# Patient Record
Sex: FEMALE | Race: WHITE | NOT HISPANIC OR LATINO | Employment: OTHER | URBAN - METROPOLITAN AREA
[De-identification: names, ages, dates, MRNs, and addresses within clinical notes are randomized per-mention and may not be internally consistent; named-entity substitution may affect disease eponyms.]

---

## 2017-02-24 ENCOUNTER — OFFICE VISIT (OUTPATIENT)
Dept: CARDIOLOGY | Facility: CLINIC | Age: 82
End: 2017-02-24

## 2017-02-24 VITALS
HEIGHT: 65 IN | SYSTOLIC BLOOD PRESSURE: 109 MMHG | BODY MASS INDEX: 29.66 KG/M2 | DIASTOLIC BLOOD PRESSURE: 66 MMHG | WEIGHT: 178 LBS | HEART RATE: 72 BPM

## 2017-02-24 DIAGNOSIS — I89.0 CHRONIC ACQUIRED LYMPHEDEMA: ICD-10-CM

## 2017-02-24 DIAGNOSIS — I11.9 HYPERTENSIVE HEART DISEASE WITHOUT HEART FAILURE: Primary | ICD-10-CM

## 2017-02-24 DIAGNOSIS — I10 CHRONIC HYPERTENSION: ICD-10-CM

## 2017-02-24 DIAGNOSIS — G47.9 DISORDERED SLEEP: ICD-10-CM

## 2017-02-24 DIAGNOSIS — E78.5 DYSLIPIDEMIA: ICD-10-CM

## 2017-02-24 PROCEDURE — 99213 OFFICE O/P EST LOW 20 MIN: CPT | Performed by: INTERNAL MEDICINE

## 2017-02-24 RX ORDER — ATORVASTATIN CALCIUM 10 MG/1
TABLET, FILM COATED ORAL
COMMUNITY
Start: 2015-09-12

## 2017-02-24 RX ORDER — NITROGLYCERIN 0.4 MG/1
TABLET SUBLINGUAL AS NEEDED
COMMUNITY
Start: 2015-05-12 | End: 2018-11-12

## 2017-02-24 RX ORDER — ACEBUTOLOL HYDROCHLORIDE 200 MG/1
CAPSULE ORAL NIGHTLY
COMMUNITY
Start: 2015-06-02 | End: 2017-04-25 | Stop reason: SDUPTHER

## 2017-02-24 RX ORDER — FAMOTIDINE 20 MG/1
20 TABLET, FILM COATED ORAL 2 TIMES DAILY
COMMUNITY
Start: 2015-09-18

## 2017-02-24 RX ORDER — TORSEMIDE 20 MG/1
20 TABLET ORAL TAKE AS DIRECTED
COMMUNITY
Start: 2014-04-28 | End: 2017-07-21 | Stop reason: HOSPADM

## 2017-02-24 RX ORDER — ERGOCALCIFEROL 1.25 MG/1
50000 CAPSULE ORAL WEEKLY
COMMUNITY
Start: 2015-08-18 | End: 2019-02-25

## 2017-02-24 NOTE — PROGRESS NOTES
Subjective:     Encounter Date:02/24/2017      Patient ID: Omayra Bhardwaj is an 81 y.o.  white female, mental health/clinical , from [place], [place].     INTERNIST: Kitty Romero MD   ENDOCRINOLOGIST: Corbin Rossi MD     Chief Complaint:   Chief Complaint   Patient presents with   • Follow-up     htn     Problem List:  1. Probable hypertensive cardiovascular disease:  a. Longstanding hypertension with recent increased palpitations and echocardiogram demonstrating reduced LVEF (0.50) with mild TR with abnormal EKG revealing mild nonspecific T-wave changes, winter 2013.   b. Acceptable event recorder with continued medical therapy felt warranted.  c. Nuclear stress test showing a normal Lexiscan with LVEF (0.74), 05/28/2015.  d. Residual class I symptoms.   2. Dyslipidemia, untreated.  3. Chronic hypertension, probably essential.   4. History of obesity (BMI 29.5).  5. Former tobacco abuse.  6. Depression.  7. Chronic lymphedema with probable chronic lower extremity venous insufficiency with recent acceptable bilateral lower extremity venous duplex study, March 2013.  8. Thyroid nodule, workup pending.  9. Hypercalcemia/hyperparathyroidism, with contemplated parathyroidectomy (Dr. Davis, [facility]).  10. Recent disordered sleep with prominent snoring and occasional nocturnal hypersomnolence.   11. Surgical history:  a. Tonsillectomy, 1942.  b. Appendectomy, 1945.  c. Bilateral cataract removal, 2010.  d. Squamous cell skin cancer resection, 2010.  e. Laparoscopy, 1986.     No Known Allergies      Current Outpatient Prescriptions:   •  acebutolol (SECTRAL) 200 MG capsule, Take  by mouth Every Night., Disp: , Rfl:   •  atorvastatin (LIPITOR) 10 MG tablet, Take  by mouth Daily., Disp: , Rfl:   •  famotidine (PEPCID) 20 MG tablet, Take  by mouth Daily., Disp: , Rfl:   •  lisinopril (PRINIVIL,ZESTRIL) 5 MG tablet, TAKE 1 TABLET DAILY., Disp: 30 tablet, Rfl:  "5  •  nitroglycerin (NITROSTAT) 0.4 MG SL tablet, Place  under the tongue As Needed., Disp: , Rfl:   •  spironolactone (ALDACTONE) 25 MG tablet, TAKE 1 TABLET DAILY., Disp: 30 tablet, Rfl: 6  •  torsemide (DEMADEX) 20 MG tablet, Take  by mouth Daily., Disp: , Rfl:   •  vitamin D (ERGOCALCIFEROL) 37853 UNITS capsule capsule, Take  by mouth 1 (One) Time Per Week., Disp: , Rfl:     History of Present Illness Patient returns for followup after an 11-month hiatus.  Patient denies any chest pain, shortness of breath, presyncope, syncope, edema, palpitations.  She hurt her left foot after dropping a hand-held shopping basket on her foot a couple of years ago, and she continues to have swelling in that foot more than her right foot.  She sees a podiatrist every 6 months.  She is scheduled to have labs drawn with her internist in a couple of months, and she will share them with us when she had them done.  She states that she got the flu vaccine, but she still got the flu 3 weeks ago.  She is currently writing an autobiography.     ROS   Obtained and otherwise negative except as outlined in problem list and HPI.    Procedures       Objective:       Vitals:    02/24/17 1420 02/24/17 1421   BP: 146/75 109/66   BP Location: Left arm Left arm   Patient Position: Sitting Standing   Pulse: 61 72   Weight: 178 lb (80.7 kg)    Height: 65\" (165.1 cm)    Recheck blood pressure right arm sitting 110/58  Body mass index is 29.62 kg/(m^2).   Last weight:  179.2 lbs.    Physical Exam   Constitutional: She appears well-developed and well-nourished.   HENT:   Head: Normocephalic and atraumatic.   Mouth/Throat: Oropharynx is clear and moist.   Neck: Neck supple. No JVD present. Carotid bruit is not present. No thyromegaly present.   Cardiovascular: Normal rate and regular rhythm.  Exam reveals no gallop, no S3 and no friction rub.    No murmur heard.  Pulses:       Carotid pulses are 1+ on the right side, and 1+ on the left side.       Radial " pulses are 1+ on the right side, and 1+ on the left side.        Femoral pulses are 1+ on the right side, and 1+ on the left side.       Popliteal pulses are 1+ on the right side, and 1+ on the left side.        Dorsalis pedis pulses are 1+ on the right side, and 1+ on the left side.        Posterior tibial pulses are 1+ on the right side, and 1+ on the left side.   Pulmonary/Chest: Effort normal and breath sounds normal.   Abdominal: Soft. She exhibits no mass. There is no hepatosplenomegaly. There is no tenderness.   Musculoskeletal: She exhibits edema (1+ left ankle).   Lymphadenopathy:     She has no cervical adenopathy.   Neurological: She is alert. She has normal strength. No cranial nerve deficit or sensory deficit.   Skin: Skin is warm, dry and intact.       Lab Review: none to review        Assessment:     Overall continued acceptable course with no interim cardiopulmonary complaints with acceptable functional status. We will defer additional diagnostic or therapeutic intervention from a cardiac perspective at this time.  Hopefully, we will be allowed to review her upcoming laboratory studies.  At this time, she does not appear to have any findings of uncontrolled hypertension, CHF, angina pectoris, or important arrhythmia.     Diagnosis Plan   1. Hypertensive heart disease without heart failure     2. Chronic hypertension     3. Dyslipidemia     4. Disordered sleep     5. Chronic lymphedema            Plan:         1. Patient to continue current medications and close follow up with the above providers.  2. Tentative cardiology follow up in 6 months or patient may return sooner PRN.       Scribed for Deniz Spence MD by Lupe Nolan, KT. 2/24/2017  3:28 PM       I, Deniz Spence MD, [facility], personally performed the services described in this documentation as scribed by the above named individual in my presence, and it is both accurate and complete. At 3:14 PM on 02/24/2017

## 2017-04-26 RX ORDER — ACEBUTOLOL HYDROCHLORIDE 200 MG/1
CAPSULE ORAL
Qty: 30 CAPSULE | Refills: 5 | Status: SHIPPED | OUTPATIENT
Start: 2017-04-26 | End: 2018-02-26 | Stop reason: SDUPTHER

## 2017-06-09 RX ORDER — LISINOPRIL 5 MG/1
TABLET ORAL
Qty: 30 TABLET | Refills: 6 | Status: SHIPPED | OUTPATIENT
Start: 2017-06-09 | End: 2018-09-13

## 2017-07-15 ENCOUNTER — APPOINTMENT (OUTPATIENT)
Dept: CT IMAGING | Facility: HOSPITAL | Age: 82
End: 2017-07-15

## 2017-07-15 ENCOUNTER — HOSPITAL ENCOUNTER (INPATIENT)
Facility: HOSPITAL | Age: 82
LOS: 6 days | Discharge: HOME HEALTH | End: 2017-07-21
Attending: EMERGENCY MEDICINE | Admitting: INTERNAL MEDICINE

## 2017-07-15 DIAGNOSIS — S02.2XXA CLOSED FRACTURE OF NASAL BONE, INITIAL ENCOUNTER: ICD-10-CM

## 2017-07-15 DIAGNOSIS — Z78.9 IMPAIRED MOBILITY AND ADLS: ICD-10-CM

## 2017-07-15 DIAGNOSIS — Z74.09 IMPAIRED FUNCTIONAL MOBILITY, BALANCE, GAIT, AND ENDURANCE: ICD-10-CM

## 2017-07-15 DIAGNOSIS — Z74.09 IMPAIRED MOBILITY AND ADLS: ICD-10-CM

## 2017-07-15 DIAGNOSIS — S06.6X0A: Primary | ICD-10-CM

## 2017-07-15 DIAGNOSIS — S02.401A MAXILLARY SINUS FRACTURE, CLOSED, INITIAL ENCOUNTER (HCC): ICD-10-CM

## 2017-07-15 DIAGNOSIS — S20.212A CHEST WALL CONTUSION, LEFT, INITIAL ENCOUNTER: ICD-10-CM

## 2017-07-15 PROBLEM — I60.9 SAH (SUBARACHNOID HEMORRHAGE) (HCC): Status: ACTIVE | Noted: 2017-07-15

## 2017-07-15 PROBLEM — N39.0 UTI (URINARY TRACT INFECTION): Status: ACTIVE | Noted: 2017-07-15

## 2017-07-15 PROBLEM — S06.6XAA SUBARACHNOID HEMORRHAGE, TRAUMATIC (HCC): Status: ACTIVE | Noted: 2017-07-15

## 2017-07-15 PROBLEM — K21.9 GERD (GASTROESOPHAGEAL REFLUX DISEASE): Status: ACTIVE | Noted: 2017-07-15

## 2017-07-15 LAB
ALBUMIN SERPL-MCNC: 4 G/DL (ref 3.2–4.8)
ALBUMIN/GLOB SERPL: 1.5 G/DL (ref 1.5–2.5)
ALP SERPL-CCNC: 66 U/L (ref 25–100)
ALT SERPL W P-5'-P-CCNC: 12 U/L (ref 7–40)
AMORPH URATE CRY URNS QL MICRO: ABNORMAL /HPF
ANION GAP SERPL CALCULATED.3IONS-SCNC: 8 MMOL/L (ref 3–11)
APTT PPP: <24 SECONDS (ref 24–31)
AST SERPL-CCNC: 21 U/L (ref 0–33)
BACTERIA UR QL AUTO: ABNORMAL /HPF
BASOPHILS # BLD AUTO: 0.07 10*3/MM3 (ref 0–0.2)
BASOPHILS NFR BLD AUTO: 0.8 % (ref 0–1)
BILIRUB SERPL-MCNC: 0.5 MG/DL (ref 0.3–1.2)
BILIRUB UR QL STRIP: NEGATIVE
BUN BLD-MCNC: 27 MG/DL (ref 9–23)
BUN/CREAT SERPL: 24.5 (ref 7–25)
CA-I SERPL ISE-MCNC: 1.37 MMOL/L (ref 1.12–1.32)
CALCIUM SPEC-SCNC: 11.1 MG/DL (ref 8.7–10.4)
CHLORIDE SERPL-SCNC: 105 MMOL/L (ref 99–109)
CLARITY UR: ABNORMAL
CO2 SERPL-SCNC: 24 MMOL/L (ref 20–31)
COLOR UR: YELLOW
CREAT BLD-MCNC: 1.1 MG/DL (ref 0.6–1.3)
DEPRECATED RDW RBC AUTO: 45.5 FL (ref 37–54)
EOSINOPHIL # BLD AUTO: 0.15 10*3/MM3 (ref 0–0.3)
EOSINOPHIL NFR BLD AUTO: 1.8 % (ref 0–3)
ERYTHROCYTE [DISTWIDTH] IN BLOOD BY AUTOMATED COUNT: 13 % (ref 11.3–14.5)
GFR SERPL CREATININE-BSD FRML MDRD: 48 ML/MIN/1.73
GLOBULIN UR ELPH-MCNC: 2.6 GM/DL
GLUCOSE BLD-MCNC: 122 MG/DL (ref 70–100)
GLUCOSE UR STRIP-MCNC: NEGATIVE MG/DL
HCT VFR BLD AUTO: 39.1 % (ref 34.5–44)
HGB BLD-MCNC: 12.5 G/DL (ref 11.5–15.5)
HGB UR QL STRIP.AUTO: ABNORMAL
HOLD SPECIMEN: NORMAL
HOLD SPECIMEN: NORMAL
HYALINE CASTS UR QL AUTO: ABNORMAL /LPF
IMM GRANULOCYTES # BLD: 0.05 10*3/MM3 (ref 0–0.03)
IMM GRANULOCYTES NFR BLD: 0.6 % (ref 0–0.6)
INR PPP: 0.93
KETONES UR QL STRIP: NEGATIVE
LEUKOCYTE ESTERASE UR QL STRIP.AUTO: ABNORMAL
LYMPHOCYTES # BLD AUTO: 1.59 10*3/MM3 (ref 0.6–4.8)
LYMPHOCYTES NFR BLD AUTO: 18.7 % (ref 24–44)
MAGNESIUM SERPL-MCNC: 2.4 MG/DL (ref 1.3–2.7)
MCH RBC QN AUTO: 30.5 PG (ref 27–31)
MCHC RBC AUTO-ENTMCNC: 32 G/DL (ref 32–36)
MCV RBC AUTO: 95.4 FL (ref 80–99)
MONOCYTES # BLD AUTO: 0.68 10*3/MM3 (ref 0–1)
MONOCYTES NFR BLD AUTO: 8 % (ref 0–12)
NEUTROPHILS # BLD AUTO: 5.96 10*3/MM3 (ref 1.5–8.3)
NEUTROPHILS NFR BLD AUTO: 70.1 % (ref 41–71)
NITRITE UR QL STRIP: NEGATIVE
PH UR STRIP.AUTO: <=5 [PH] (ref 5–8)
PLATELET # BLD AUTO: 185 10*3/MM3 (ref 150–450)
PMV BLD AUTO: 9.6 FL (ref 6–12)
POTASSIUM BLD-SCNC: 4.4 MMOL/L (ref 3.5–5.5)
PROT SERPL-MCNC: 6.6 G/DL (ref 5.7–8.2)
PROT UR QL STRIP: NEGATIVE
PROTHROMBIN TIME: 10.1 SECONDS (ref 9.6–11.5)
RBC # BLD AUTO: 4.1 10*6/MM3 (ref 3.89–5.14)
RBC # UR: ABNORMAL /HPF
REF LAB TEST METHOD: ABNORMAL
SODIUM BLD-SCNC: 137 MMOL/L (ref 132–146)
SP GR UR STRIP: 1.01 (ref 1–1.03)
SQUAMOUS #/AREA URNS HPF: ABNORMAL /HPF
TROPONIN I SERPL-MCNC: 0 NG/ML (ref 0–0.07)
TROPONIN I SERPL-MCNC: 0 NG/ML (ref 0–0.07)
UROBILINOGEN UR QL STRIP: ABNORMAL
WBC NRBC COR # BLD: 8.5 10*3/MM3 (ref 3.5–10.8)
WBC UR QL AUTO: ABNORMAL /HPF
WHOLE BLOOD HOLD SPECIMEN: NORMAL
WHOLE BLOOD HOLD SPECIMEN: NORMAL

## 2017-07-15 PROCEDURE — 93005 ELECTROCARDIOGRAM TRACING: CPT | Performed by: EMERGENCY MEDICINE

## 2017-07-15 PROCEDURE — 99291 CRITICAL CARE FIRST HOUR: CPT | Performed by: INTERNAL MEDICINE

## 2017-07-15 PROCEDURE — 25010000002 TETANUS-DIPHTHERIA TOXOIDS (ADULT) PER 0.5 ML: Performed by: EMERGENCY MEDICINE

## 2017-07-15 PROCEDURE — 70450 CT HEAD/BRAIN W/O DYE: CPT

## 2017-07-15 PROCEDURE — 70486 CT MAXILLOFACIAL W/O DYE: CPT

## 2017-07-15 PROCEDURE — 81001 URINALYSIS AUTO W/SCOPE: CPT | Performed by: EMERGENCY MEDICINE

## 2017-07-15 PROCEDURE — 85025 COMPLETE CBC W/AUTO DIFF WBC: CPT | Performed by: EMERGENCY MEDICINE

## 2017-07-15 PROCEDURE — 80053 COMPREHEN METABOLIC PANEL: CPT | Performed by: EMERGENCY MEDICINE

## 2017-07-15 PROCEDURE — 25010000002 ONDANSETRON PER 1 MG

## 2017-07-15 PROCEDURE — 84484 ASSAY OF TROPONIN QUANT: CPT

## 2017-07-15 PROCEDURE — 87086 URINE CULTURE/COLONY COUNT: CPT | Performed by: EMERGENCY MEDICINE

## 2017-07-15 PROCEDURE — 85730 THROMBOPLASTIN TIME PARTIAL: CPT | Performed by: NURSE PRACTITIONER

## 2017-07-15 PROCEDURE — 25010000002 CEFTRIAXONE PER 250 MG: Performed by: NURSE PRACTITIONER

## 2017-07-15 PROCEDURE — 90714 TD VACC NO PRESV 7 YRS+ IM: CPT | Performed by: EMERGENCY MEDICINE

## 2017-07-15 PROCEDURE — 99285 EMERGENCY DEPT VISIT HI MDM: CPT

## 2017-07-15 PROCEDURE — 72125 CT NECK SPINE W/O DYE: CPT

## 2017-07-15 PROCEDURE — 82330 ASSAY OF CALCIUM: CPT | Performed by: EMERGENCY MEDICINE

## 2017-07-15 PROCEDURE — 83735 ASSAY OF MAGNESIUM: CPT | Performed by: EMERGENCY MEDICINE

## 2017-07-15 PROCEDURE — 85610 PROTHROMBIN TIME: CPT | Performed by: NURSE PRACTITIONER

## 2017-07-15 PROCEDURE — 71250 CT THORAX DX C-: CPT

## 2017-07-15 RX ORDER — ATORVASTATIN CALCIUM 10 MG/1
10 TABLET, FILM COATED ORAL NIGHTLY
Status: DISCONTINUED | OUTPATIENT
Start: 2017-07-15 | End: 2017-07-21 | Stop reason: HOSPADM

## 2017-07-15 RX ORDER — LISINOPRIL 5 MG/1
5 TABLET ORAL
Status: DISCONTINUED | OUTPATIENT
Start: 2017-07-16 | End: 2017-07-21 | Stop reason: HOSPADM

## 2017-07-15 RX ORDER — ONDANSETRON 2 MG/ML
INJECTION INTRAMUSCULAR; INTRAVENOUS
Status: COMPLETED
Start: 2017-07-15 | End: 2017-07-15

## 2017-07-15 RX ORDER — SODIUM CHLORIDE 9 MG/ML
50 INJECTION, SOLUTION INTRAVENOUS CONTINUOUS
Status: DISCONTINUED | OUTPATIENT
Start: 2017-07-15 | End: 2017-07-21 | Stop reason: HOSPADM

## 2017-07-15 RX ORDER — ONDANSETRON 2 MG/ML
4 INJECTION INTRAMUSCULAR; INTRAVENOUS ONCE
Status: COMPLETED | OUTPATIENT
Start: 2017-07-15 | End: 2017-07-15

## 2017-07-15 RX ORDER — CEFTRIAXONE SODIUM 1 G/50ML
1 INJECTION, SOLUTION INTRAVENOUS EVERY 24 HOURS
Status: DISCONTINUED | OUTPATIENT
Start: 2017-07-15 | End: 2017-07-19

## 2017-07-15 RX ORDER — ONDANSETRON 4 MG/1
4 TABLET, FILM COATED ORAL EVERY 6 HOURS PRN
Status: DISCONTINUED | OUTPATIENT
Start: 2017-07-15 | End: 2017-07-21 | Stop reason: HOSPADM

## 2017-07-15 RX ORDER — SODIUM CHLORIDE, SODIUM LACTATE, POTASSIUM CHLORIDE, CALCIUM CHLORIDE 600; 310; 30; 20 MG/100ML; MG/100ML; MG/100ML; MG/100ML
50 INJECTION, SOLUTION INTRAVENOUS CONTINUOUS
Status: DISCONTINUED | OUTPATIENT
Start: 2017-07-15 | End: 2017-07-15

## 2017-07-15 RX ORDER — SODIUM CHLORIDE 0.9 % (FLUSH) 0.9 %
1-10 SYRINGE (ML) INJECTION AS NEEDED
Status: DISCONTINUED | OUTPATIENT
Start: 2017-07-15 | End: 2017-07-21 | Stop reason: HOSPADM

## 2017-07-15 RX ORDER — LABETALOL HYDROCHLORIDE 5 MG/ML
20 INJECTION, SOLUTION INTRAVENOUS
Status: DISCONTINUED | OUTPATIENT
Start: 2017-07-15 | End: 2017-07-21 | Stop reason: HOSPADM

## 2017-07-15 RX ORDER — ACETAMINOPHEN 325 MG/1
650 TABLET ORAL EVERY 4 HOURS PRN
Status: DISCONTINUED | OUTPATIENT
Start: 2017-07-15 | End: 2017-07-21 | Stop reason: HOSPADM

## 2017-07-15 RX ORDER — FAMOTIDINE 20 MG/1
20 TABLET, FILM COATED ORAL DAILY
Status: DISCONTINUED | OUTPATIENT
Start: 2017-07-16 | End: 2017-07-21 | Stop reason: HOSPADM

## 2017-07-15 RX ORDER — SENNA AND DOCUSATE SODIUM 50; 8.6 MG/1; MG/1
2 TABLET, FILM COATED ORAL 2 TIMES DAILY
Status: DISCONTINUED | OUTPATIENT
Start: 2017-07-15 | End: 2017-07-21 | Stop reason: HOSPADM

## 2017-07-15 RX ORDER — SODIUM CHLORIDE 0.9 % (FLUSH) 0.9 %
10 SYRINGE (ML) INJECTION AS NEEDED
Status: DISCONTINUED | OUTPATIENT
Start: 2017-07-15 | End: 2017-07-21 | Stop reason: HOSPADM

## 2017-07-15 RX ADMIN — ONDANSETRON 4 MG: 2 INJECTION INTRAMUSCULAR; INTRAVENOUS at 18:05

## 2017-07-15 RX ADMIN — SODIUM CHLORIDE 50 ML/HR: 9 INJECTION, SOLUTION INTRAVENOUS at 22:20

## 2017-07-15 RX ADMIN — ATORVASTATIN CALCIUM 10 MG: 10 TABLET, FILM COATED ORAL at 22:20

## 2017-07-15 RX ADMIN — Medication 2 TABLET: at 22:20

## 2017-07-15 RX ADMIN — CEFTRIAXONE SODIUM 1 G: 1 INJECTION, SOLUTION INTRAVENOUS at 22:20

## 2017-07-15 RX ADMIN — CLOSTRIDIUM TETANI TOXOID ANTIGEN (FORMALDEHYDE INACTIVATED) AND CORYNEBACTERIUM DIPHTHERIAE TOXOID ANTIGEN (FORMALDEHYDE INACTIVATED) 0.5 ML: 5; 2 INJECTION, SUSPENSION INTRAMUSCULAR at 20:13

## 2017-07-15 NOTE — ED PROVIDER NOTES
Subjective   HPI Comments: Ms. Omayra Bhardwaj is a 81 y.o. female who presents to the ED with c/o a fall. Her daughter reports that she went to visit the patient earlier today around 1500 and at the time she felt normal. However, after they left at 1600 the patient went to the restroom to have a bowel movement and upon getting off the toilet she became dizzy and had a fall. She states that she did not have LOC but did hit her head during the fall. She was then able to crawl into the hallway and use the phone to call for help. She currently complains of a nose bleed, and left rib pain. She denies neck , back , hip and leg pain. No other acute complaints at this time.  Patient is a 81 y.o. female presenting with fall.   History provided by:  Relative and patient  Fall   Mechanism of injury: fall    Injury location:  Face  Facial injury location:  Nose  Incident location:  Bathroom  Time since incident:  1 hour  Fall:     Fall occurred:  Standing    Point of impact:  Face    Entrapped after fall: no    Protective equipment: none    Suspicion of alcohol use: no    Suspicion of drug use: no    Prior to arrival data:     Loss of consciousness: no      Amnesic to event: no    Associated symptoms: chest pain    Associated symptoms: no abdominal pain, no back pain, no difficulty breathing, no loss of consciousness and no neck pain        Review of Systems   HENT: Positive for nosebleeds.    Cardiovascular: Positive for chest pain.   Gastrointestinal: Negative for abdominal pain.   Musculoskeletal: Negative for back pain and neck pain.   Skin: Positive for wound (Skin tear to left hand).   Neurological: Negative for loss of consciousness.   All other systems reviewed and are negative.      Past Medical History:   Diagnosis Date   • Chronic hypertension     probably essential.     • Chronic lymphedema     with probable chronic lower extremity venous insufficiency with recent acceptable bilateral lower extremity venous  duplex study, March 2013.   • Depression    • Disordered sleep     with prominent snoring and occasional nocturnal hypersomnolence.    • Dyslipidemia      untreated.   • Former tobacco use    • GERD (gastroesophageal reflux disease)    • History of obesity     (BMI 29.5).   • Hypercalcemia    • Hyperparathyroidism     with contemplated parathyroidectomy (Dr. Davis, [facility]).   • Hypertensive cardiovascular disease     probable   • Thyroid nodule     workup pending.       No Known Allergies    Past Surgical History:   Procedure Laterality Date   • APPENDECTOMY  1945   • BREAST BIOPSY Left    • BREAST EXCISIONAL BIOPSY Left    • CATARACT EXTRACTION  2010    bilateral    • OTHER SURGICAL HISTORY  1986    Laparoscopy   • PARATHYROIDECTOMY     • SQUAMOUS CELL CARCINOMA EXCISION  2010   • TONSILLECTOMY  1942       Family History   Problem Relation Age of Onset   • Cancer Mother    • Breast cancer Neg Hx    • Ovarian cancer Neg Hx        Social History     Social History   • Marital status:      Spouse name: N/A   • Number of children: N/A   • Years of education: N/A     Social History Main Topics   • Smoking status: Former Smoker     Quit date: 1963   • Smokeless tobacco: None   • Alcohol use No   • Drug use: No   • Sexual activity: Defer     Other Topics Concern   • None     Social History Narrative         Objective   Physical Exam   Constitutional: She is oriented to person, place, and time. She appears well-developed and well-nourished. No distress.   HENT:   Head: Normocephalic. Head is with abrasion.   Nose: No nasal septal hematoma.   Swelling of the bridge of the nose.  Patient has blood coming out of both nares but no septal hematoma is noted.     Eyes: Conjunctivae and EOM are normal. Pupils are equal, round, and reactive to light. No scleral icterus.   Neck: Normal range of motion. Neck supple.   Cardiovascular: Normal rate, regular rhythm, normal heart sounds and intact  distal pulses.    No murmur heard.  Pulmonary/Chest: Effort normal and breath sounds normal. No respiratory distress. She exhibits tenderness (Left lateral chest wall tenderness ).   Abdominal: Soft. Bowel sounds are normal. There is no tenderness.   Musculoskeletal: Normal range of motion.   Neurological: She is alert and oriented to person, place, and time. No cranial nerve deficit. She exhibits normal muscle tone. Coordination normal.   Skin: Skin is warm and dry. Abrasion and bruising noted. She is not diaphoretic.   Patient has a skin tear to the dorsum of her left hand. She also has a midline hematoma to her forehead with small abrasions, as well as bruising and swelling on the bridge of her nose with another deep abrasion. She also has several abrasions to her left cheek.   Psychiatric: She has a normal mood and affect. Her behavior is normal.   Nursing note and vitals reviewed.      Critical Care  Performed by: NISREEN GASTELUM  Authorized by: NISREEN GASTELUM   Total critical care time: 35 minutes  Critical care time was exclusive of separately billable procedures and treating other patients.  Critical care was necessary to treat or prevent imminent or life-threatening deterioration of the following conditions: trauma.  Critical care was time spent personally by me on the following activities: discussions with consultants, evaluation of patient's response to treatment, examination of patient, obtaining history from patient or surrogate, ordering and performing treatments and interventions, ordering and review of laboratory studies, ordering and review of radiographic studies and re-evaluation of patient's condition.               ED Course  ED Course     EKG NSR.  GCS 15.  Pt with extensive facial swelling.  Chest wall tenderness without crepitus.  Lungs clear.  Imaging shows small SAH and some facial fractures but no other major injuries.  Serial rechecks, pt is a little sleepy but easily alerts  and is fully oriented and neuro intact.  On later exams she is completely alert again.  I discussed with Dr Gutierrez who will see pt in consult.  Labs reviewed.  It sounds like her fall was a vagal episode.  Pt admitted to ICU for further management.                  MDM  Number of Diagnoses or Management Options  Chest wall contusion, left, initial encounter:   Closed fracture of nasal bone, initial encounter:   Maxillary sinus fracture, closed, initial encounter:   Subarachnoid hemorrhage, traumatic, without loss of consciousness, initial encounter:      Amount and/or Complexity of Data Reviewed  Clinical lab tests: reviewed and ordered  Tests in the radiology section of CPT®: ordered and reviewed  Decide to obtain previous medical records or to obtain history from someone other than the patient: yes  Obtain history from someone other than the patient: yes  Discuss the patient with other providers: yes  Independent visualization of images, tracings, or specimens: yes    Critical Care  Total time providing critical care: 30-74 minutes      Final diagnoses:   Subarachnoid hemorrhage, traumatic, without loss of consciousness, initial encounter   Closed fracture of nasal bone, initial encounter   Maxillary sinus fracture, closed, initial encounter   Chest wall contusion, left, initial encounter       Documentation assistance provided by lola Escobedo.  Information recorded by the scribe was done at my direction and has been verified and validated by me.     Teresa Escobedo  07/15/17 1743       Samuel Morris MD  07/16/17 0034

## 2017-07-16 LAB
ANION GAP SERPL CALCULATED.3IONS-SCNC: 7 MMOL/L (ref 3–11)
BUN BLD-MCNC: 19 MG/DL (ref 9–23)
BUN/CREAT SERPL: 21.1 (ref 7–25)
CALCIUM SPEC-SCNC: 10.7 MG/DL (ref 8.7–10.4)
CHLORIDE SERPL-SCNC: 107 MMOL/L (ref 99–109)
CO2 SERPL-SCNC: 24 MMOL/L (ref 20–31)
CREAT BLD-MCNC: 0.9 MG/DL (ref 0.6–1.3)
GFR SERPL CREATININE-BSD FRML MDRD: 60 ML/MIN/1.73
GLUCOSE BLD-MCNC: 95 MG/DL (ref 70–100)
HBA1C MFR BLD: 5.8 % (ref 4.8–5.6)
MAGNESIUM SERPL-MCNC: 2.3 MG/DL (ref 1.3–2.7)
PHOSPHATE SERPL-MCNC: 3.2 MG/DL (ref 2.4–5.1)
POTASSIUM BLD-SCNC: 3.9 MMOL/L (ref 3.5–5.5)
SODIUM BLD-SCNC: 138 MMOL/L (ref 132–146)
T4 FREE SERPL-MCNC: 1.29 NG/DL (ref 0.89–1.76)
TSH SERPL DL<=0.05 MIU/L-ACNC: 0.59 MIU/ML (ref 0.35–5.35)

## 2017-07-16 PROCEDURE — 83735 ASSAY OF MAGNESIUM: CPT | Performed by: NURSE PRACTITIONER

## 2017-07-16 PROCEDURE — 84100 ASSAY OF PHOSPHORUS: CPT | Performed by: NURSE PRACTITIONER

## 2017-07-16 PROCEDURE — 84439 ASSAY OF FREE THYROXINE: CPT | Performed by: NURSE PRACTITIONER

## 2017-07-16 PROCEDURE — 80048 BASIC METABOLIC PNL TOTAL CA: CPT | Performed by: NURSE PRACTITIONER

## 2017-07-16 PROCEDURE — 84443 ASSAY THYROID STIM HORMONE: CPT | Performed by: NURSE PRACTITIONER

## 2017-07-16 PROCEDURE — 99222 1ST HOSP IP/OBS MODERATE 55: CPT | Performed by: NEUROLOGICAL SURGERY

## 2017-07-16 PROCEDURE — 83036 HEMOGLOBIN GLYCOSYLATED A1C: CPT | Performed by: NURSE PRACTITIONER

## 2017-07-16 PROCEDURE — 25010000002 CEFTRIAXONE PER 250 MG: Performed by: NURSE PRACTITIONER

## 2017-07-16 RX ADMIN — ACETAMINOPHEN 650 MG: 325 TABLET, FILM COATED ORAL at 23:14

## 2017-07-16 RX ADMIN — SODIUM CHLORIDE 50 ML/HR: 9 INJECTION, SOLUTION INTRAVENOUS at 18:09

## 2017-07-16 RX ADMIN — ATORVASTATIN CALCIUM 10 MG: 10 TABLET, FILM COATED ORAL at 21:41

## 2017-07-16 RX ADMIN — CEFTRIAXONE SODIUM 1 G: 1 INJECTION, SOLUTION INTRAVENOUS at 21:41

## 2017-07-16 RX ADMIN — ACETAMINOPHEN 650 MG: 325 TABLET, FILM COATED ORAL at 05:35

## 2017-07-16 RX ADMIN — ACETAMINOPHEN 650 MG: 325 TABLET, FILM COATED ORAL at 13:41

## 2017-07-16 RX ADMIN — FAMOTIDINE 20 MG: 20 TABLET ORAL at 08:34

## 2017-07-16 NOTE — PLAN OF CARE
Problem: Skin Integrity Impairment, Risk/Actual (Adult)  Goal: Identify Related Risk Factors and Signs and Symptoms  Outcome: Ongoing (interventions implemented as appropriate)  Goal: Skin Integrity/Wound Healing  Outcome: Ongoing (interventions implemented as appropriate)    Problem: Stroke (Hemorrhagic) (Adult)  Goal: Signs and Symptoms of Listed Potential Problems Will be Absent or Manageable (Stroke)  Outcome: Ongoing (interventions implemented as appropriate)    Problem: Patient Care Overview (Adult)  Goal: Plan of Care Review  Outcome: Ongoing (interventions implemented as appropriate)  Goal: Adult Individualization and Mutuality  Outcome: Ongoing (interventions implemented as appropriate)  Goal: Discharge Needs Assessment  Outcome: Ongoing (interventions implemented as appropriate)

## 2017-07-16 NOTE — PLAN OF CARE
Problem: Skin Integrity Impairment, Risk/Actual (Adult)  Goal: Identify Related Risk Factors and Signs and Symptoms  Outcome: Ongoing (interventions implemented as appropriate)  Goal: Skin Integrity/Wound Healing  Outcome: Ongoing (interventions implemented as appropriate)    Problem: Stroke (Hemorrhagic) (Adult)  Goal: Signs and Symptoms of Listed Potential Problems Will be Absent or Manageable (Stroke)  Outcome: Ongoing (interventions implemented as appropriate)

## 2017-07-16 NOTE — CONSULTS
Reason for consultation: Traumatic subarachnoid hemorrhage    History of present illness:  This is an 81-year-old woman who suffered a syncopal events and subsequent fall yesterday.  She was brought to [facility] where she had a CT scan of the head which demonstrated a small amount of temporal lobe traumatic subarachnoid hemorrhage.  She was admitted to the intensive care unit for observation, and neurosurgical consultation was requested.    ROS:  A 12 point review of systems was performed.  All systems reviewed were negative with the exception of those mentioned in the history of present illness.    Past medical history:  Past Medical History:   Diagnosis Date   • Chronic hypertension     probably essential.     • Chronic lymphedema     with probable chronic lower extremity venous insufficiency with recent acceptable bilateral lower extremity venous duplex study, March 2013.   • Depression    • Disordered sleep     with prominent snoring and occasional nocturnal hypersomnolence.    • Dyslipidemia      untreated.   • Former tobacco use    • GERD (gastroesophageal reflux disease)    • History of obesity     (BMI 29.5).   • Hypercalcemia    • Hyperparathyroidism     with contemplated parathyroidectomy (Dr. Davis, [facility]).   • Hypertensive cardiovascular disease     probable   • Thyroid nodule     workup pending.       Past surgical history:  Past Surgical History:   Procedure Laterality Date   • APPENDECTOMY  1945   • BREAST BIOPSY Left    • BREAST EXCISIONAL BIOPSY Left    • CATARACT EXTRACTION  2010    bilateral    • OTHER SURGICAL HISTORY  1986    Laparoscopy   • PARATHYROIDECTOMY     • SQUAMOUS CELL CARCINOMA EXCISION  2010   • TONSILLECTOMY  1942       Social history:  Social History     Social History   • Marital status:      Spouse name: N/A   • Number of children: N/A   • Years of education: N/A     Occupational History   • Not on file.     Social History Main  Topics   • Smoking status: Former Smoker     Quit date:    • Smokeless tobacco: Not on file   • Alcohol use No   • Drug use: No   • Sexual activity: Defer     Other Topics Concern   • Not on file     Social History Narrative       Family history:  Family History   Problem Relation Age of Onset   • Cancer Mother    • Breast cancer Neg Hx    • Ovarian cancer Neg Hx        Allergies:  No Known Allergies    Outpatient medications:  Scheduled Meds:  atorvastatin 10 mg Oral Nightly   ceftriaxone 1 g Intravenous Q24H   famotidine 20 mg Oral Daily   lisinopril 5 mg Oral Q24H   sennosides-docusate sodium 2 tablet Oral BID     Continuous Infusions:  sodium chloride 50 mL/hr Last Rate: 50 mL/hr (07/15/17 2220)     PRN Meds:.•  acetaminophen  •  labetalol  •  ondansetron  •  sodium chloride  •  sodium chloride    Physical Examination:  General:  Vitals:    17 0701   BP: 101/50   Pulse: 60   Resp:    Temp:    SpO2: 94%     Systolic (24hrs), Av , Min:94 , Max:140     Diastolic (24hrs), Av, Min:46, Max:97      Temp (24hrs), Av.9 °F (37.2 °C), Min:98.5 °F (36.9 °C), Max:99.6 °F (37.6 °C)      Neurological: Cranial nerves II through XII are grossly intact.  Unable to assess finger to nose testing or pronator drift due to left shoulder trauma  Sensation is intact to light touch throughout.  Dysphonic speech secondary to nasal swelling  Musculoskeletal: Age-appropriate, full motor strength in all 4 extremities, unable to assess proximal strength in the left upper extremity due to shoulder pain  Vascular: 2+ radial pulses bilaterally.  Cardiovascular: Regular rate and rhythm.  Extremities: No clubbing, cyanosis, or edema.  Pulmonary: Normal effort and excursion.  No evidence of respiratory distress.  Psychiatric: Normal mood and affect  HEENT: Raccoon's eyes.  Multiple facial lacerations, ecchymosis, and swelling.  Eyes: No scleral icterus.  Extraocular eye movements are intact, and pupils are equal, round, and  "reactive to light.    Imaging:  A noncontrast head CT of the brain that was performed yesterday is reviewed.  According to the interpreting radiologist, there is \"suggestion of minimal subarachnoid hemorrhage in the right temporal region, images 13-15 series 3.  No evidence of mass effect.  No midline shift.  There is generalized cerebral atrophy.  \"      Assessment and Plan:  This is an 81-year-old woman who suffered a vertiginous episode yesterday when she was getting up from the toilet and fell face first striking her head.  She has a tiny, traumatic subarachnoid hemorrhage area did she has returned to her neurological baseline.  She does not complain of significant symptoms of vertebrobasilar insufficiency, and she is actually quite active doing samir chi a regular basis.  I don't think that workup for vertebrobasilar insufficiency is probably indicated at this point area if she does complain of significant vertigo, then my next course of action would be to order a MRA of the head neck to assess for basilar or vertebrobasilar stenosis.    She can follow-up with me on an as-needed basis.  I did review the signs and symptoms of vertebrobasilar insufficiency and postconcussive syndrome.  From a neurosurgical standpoint, she can safely be discharged home from my standpoint today.    "

## 2017-07-16 NOTE — H&P
Pulmonary/Critical Care History and Physical Exam     LOS: 0 days   Patient Care Team:  Kitty Romero MD as PCP - General  Kitty Romero MD as PCP - Family Medicine    Chief Complaint:    Chief Complaint   Patient presents with   • Fall       Subjective     HPI:   80 YO with a PMH of HTN, hyperlipidemia, hypertensive heart disease with prior negative lexiscan 5/28/15 followed by Dr. Spence who passed out in the bathroom after having a bowel movement and had facial / head trauma.  She did loose consciousness for an unknown amount of time and reports that when she awakened there was a large amount of thick blood around her face.  She has been in her usual state of health recently although she does report she had a mild headache this am and has recently had some intermittent facial twitches.      Evaluation in the ED revealed a possible small right temporal subarachnoid hemorrhage, nasal bone fractures and several possible non-displaced sinus fractures.  She apparently was drowsy in the ED intermittently.  CT c-spine showed degenerative changes and grade 1 anterolisthesis of C4 on C5 and C7 on T1 most likely on a degenerative basis.  It was negative for fracture.  CT chest showed no acute traumatic intrathoracic abnormality with minimal tree-in bud nodularity / bronchiolitis in RML which was felt to be age-indeterminate.      History taken from: patient    Past Medical History:   Diagnosis Date   • Chronic hypertension     probably essential.     • Chronic lymphedema     with probable chronic lower extremity venous insufficiency with recent acceptable bilateral lower extremity venous duplex study, March 2013.   • Depression    • Disordered sleep     with prominent snoring and occasional nocturnal hypersomnolence.    • Dyslipidemia      untreated.   • Former tobacco use    • GERD (gastroesophageal reflux disease)    • History of obesity     (BMI 29.5).   • Hypercalcemia    • Hyperparathyroidism     with  contemplated parathyroidectomy (Dr. Davis, [facility]).   • Hypertensive cardiovascular disease     probable   • Thyroid nodule     workup pending.       Past Surgical History:   Procedure Laterality Date   • APPENDECTOMY  1945   • BREAST BIOPSY Left    • BREAST EXCISIONAL BIOPSY Left    • CATARACT EXTRACTION  2010    bilateral    • OTHER SURGICAL HISTORY  1986    Laparoscopy   • PARATHYROIDECTOMY     • SQUAMOUS CELL CARCINOMA EXCISION  2010   • TONSILLECTOMY  1942       Family History   Problem Relation Age of Onset   • Cancer Mother    • Breast cancer Neg Hx    • Ovarian cancer Neg Hx        Social History     Social History   • Marital status:      Spouse name: N/A   • Number of children: N/A   • Years of education: N/A     Occupational History   • Not on file.     Social History Main Topics   • Smoking status: Former Smoker     Quit date: 1963   • Smokeless tobacco: Not on file   • Alcohol use No   • Drug use: No   • Sexual activity: Defer     Other Topics Concern   • Not on file     Social History Narrative       No Known Allergies    PMH/FH/SocH were reviewed by me and updates were made.     Review of Systems:    Review of 14 systems was completed with positives and pertinent negatives noted in the subjective section.  All other systems reviewed and are negative.       Musculoskeletal: positive for  joint pain and left shoulder, right knee    Objective     Vital Signs  Temp:  [98.5 °F (36.9 °C)-98.6 °F (37 °C)] 98.6 °F (37 °C)  Heart Rate:  [61-79] 78  Resp:  [15-16] 16  BP: (126-140)/(60-97) 126/68  Body mass index is 30.11 kg/(m^2).       Physical Exam:     General Appearance:    Alert, cooperative, in no acute distress   Head:    Normocephalic, without obvious abnormality, swelling and small laceration above nose / brow ridge.  Periorbital swelling and ecchymosis.     Eyes:            Lids and lashes normal, conjunctivae and sclerae normal, no   icterus, no pallor,  corneas clear, PERRL   ENMT:   Ears appear intact with no abnormalities noted      No oral lesions, no thrush, oral mucosa moist, epistaxis with blood clots in nares bilaterally.    Neck:   No adenopathy, supple, trachea midline, no thyromegaly, no   carotid bruit, no JVD       Lungs/resp:     Normal effort, symmetric chest rise, no crepitus, clear to      auscultation bilaterally, no chest wall tenderness                  Heart/CV:    Regular rhythm and normal rate, normal S1 and S2, no            murmur   Abdomen/GI:     Normal bowel sounds, no masses, no organomegaly, soft        non-tender, non-distended   G/U:     Deferred   Extremities/MSK:   No clubbing or cyanosis, trace bilateral edema.  Normal tone.  No deformities.    Pulses:   Pulses palpable and equal bilaterally   Skin:   Small laceration on dorsum of left hand, no rash   Hem/Lymph:   No cervical or supraclavicular adenopathy.    Neurologic:   Moves all extremities with no obvious focal motor deficit.  Cranial nerves 2 - 12 grossly intact            Psychiatric:  Normal mood and affect, oriented x 3.      Results Review:     I reviewed the patient's new clinical results.     Results from last 7 days  Lab Units 07/15/17  1712   SODIUM mmol/L 137   POTASSIUM mmol/L 4.4   CHLORIDE mmol/L 105   CO2 mmol/L 24.0   BUN mg/dL 27*   CREATININE mg/dL 1.10   CALCIUM mg/dL 11.1*   BILIRUBIN mg/dL 0.5   ALK PHOS U/L 66   ALT (SGPT) U/L 12   AST (SGOT) U/L 21   GLUCOSE mg/dL 122*       Results from last 7 days  Lab Units 07/15/17  1712   WBC 10*3/mm3 8.50   HEMOGLOBIN g/dL 12.5   HEMATOCRIT % 39.1   PLATELETS 10*3/mm3 185           I reviewed the patient's new imaging including images and reports.  The following were reviewed with findings noted in HPI.   CT head  CT facial bones  CT C-Spine  CT chest    Medication Review:     atorvastatin 10 mg Oral Nightly   ceftriaxone 1 g Intravenous Q24H   [START ON 7/16/2017] famotidine 20 mg Oral Daily   sennosides-docusate  sodium 2 tablet Oral BID       sodium chloride 50 mL/hr       Assessment/Plan     Hospital Problem List     * (Principal)Traumatic SAH (subarachnoid hemorrhage)    UTI (urinary tract infection)    GERD (gastroesophageal reflux disease)    Hypertensive cardiovascular disease    Overview Addendum 11/9/2016  1:03 PM by Stacey Ramachandran     Probable  a. Longstanding hypertension with recent increased palpitations and echocardiogram demonstrating reduced LVEF (0.50) with mild TR with abnormal EKG revealing mild nonspecific T-wave changes, winter 2013.   b. Acceptable event recorder with continued medical therapy felt warranted.  c. Nuclear stress test showing a normal Lexiscan with LVEF (0.74), 05/28/2015.  d. Residual class I symptoms.          Dyslipidemia    Overview Signed 11/9/2016 12:53 PM by Stacey Ramachandran      untreated.         Chronic hypertension    Overview Signed 11/9/2016 12:54 PM by Stacey Ramachandran     probably essential.           Depression    Chronic lymphedema    Overview Signed 11/9/2016 12:56 PM by Stacey Ramachandran     with probable chronic lower extremity venous insufficiency with recent acceptable bilateral lower extremity venous duplex study, March 2013.         Thyroid nodule    Overview Signed 11/9/2016 12:56 PM by Stacey Ramachandran     workup pending.         Hyperparathyroidism    Overview Signed 11/9/2016 12:57 PM by Stacey Ramachandran     with contemplated parathyroidectomy (Dr. Davis, [facility]).         Disordered sleep    Overview Signed 11/9/2016 12:59 PM by Stacey Ramachandran     with prominent snoring and occasional nocturnal hypersomnolence.              81-year-old female with hypertension who had a syncopal event after using the bathroom earlier today resulting in a fall with a small subarachnoid hemorrhage and facial fractures.  Labs show some evidence of a urinary tract infection.  Given the subarachnoid hemorrhage as well as some intermittent drowsiness, she will be  monitored in the intensive care unit setting.  Dr. Gutierrez with neurosurgery was contacted by the emergency department and will see the patient.    Plan:  1.  Admit to the intensive care unit.  2.  Rocephin for urinary tract infection.  3.  Avoid anticoagulation including heparin/Lovenox DVT prophylaxis.  4.  SCDs for DVT prophylaxis.  5.  Hourly neuro checks in neuro ICU.  Stat CT head if decline in neurologic status.  6.  Dr. Gutierrez with neurosurgery will see the patient.  7.  Continue home antihypertensives and avoid uncontrolled hypertension.  8.  Hold diuretics for now.      Dex Mullen MD  07/15/17  9:34 PM    45 minutes of critical care provided. This time excludes other billable procedures. Time does include preparation of documents, medical consultations, review of old records, and direct bedside care. Patient was at high risk for life-threatening deterioration due to subarachnoid hemorrhage.

## 2017-07-17 ENCOUNTER — APPOINTMENT (OUTPATIENT)
Dept: CARDIOLOGY | Facility: HOSPITAL | Age: 82
End: 2017-07-17
Attending: INTERNAL MEDICINE

## 2017-07-17 LAB
BACTERIA SPEC AEROBE CULT: NORMAL
BH CV ECHO MEAS - AO ROOT AREA (BSA CORRECTED): 1.4
BH CV ECHO MEAS - AO ROOT AREA: 5.5 CM^2
BH CV ECHO MEAS - AO ROOT DIAM: 2.7 CM
BH CV ECHO MEAS - BSA(HAYCOCK): 1.9 M^2
BH CV ECHO MEAS - BSA: 1.9 M^2
BH CV ECHO MEAS - BZI_BMI: 29.3 KILOGRAMS/M^2
BH CV ECHO MEAS - BZI_METRIC_HEIGHT: 165.1 CM
BH CV ECHO MEAS - BZI_METRIC_WEIGHT: 79.8 KG
BH CV ECHO MEAS - EDV(CUBED): 84.8 ML
BH CV ECHO MEAS - EDV(TEICH): 87.3 ML
BH CV ECHO MEAS - EF(CUBED): 79.4 %
BH CV ECHO MEAS - EF(TEICH): 71.9 %
BH CV ECHO MEAS - ESV(CUBED): 17.5 ML
BH CV ECHO MEAS - ESV(TEICH): 24.5 ML
BH CV ECHO MEAS - FS: 40.9 %
BH CV ECHO MEAS - IVS/LVPW: 0.81
BH CV ECHO MEAS - IVSD: 1.1 CM
BH CV ECHO MEAS - LA DIMENSION: 3.5 CM
BH CV ECHO MEAS - LA/AO: 1.3
BH CV ECHO MEAS - LAT PEAK E' VEL: 7.7 CM/SEC
BH CV ECHO MEAS - LV MASS(C)D: 141.7 GRAMS
BH CV ECHO MEAS - LV MASS(C)DI: 75.6 GRAMS/M^2
BH CV ECHO MEAS - LVIDD: 4.4 CM
BH CV ECHO MEAS - LVIDS: 2.6 CM
BH CV ECHO MEAS - LVOT AREA (M): 3.5 CM^2
BH CV ECHO MEAS - LVOT AREA: 3.4 CM^2
BH CV ECHO MEAS - LVOT DIAM: 2.1 CM
BH CV ECHO MEAS - LVPWD: 1.1 CM
BH CV ECHO MEAS - MED PEAK E' VEL: 8 CM/SEC
BH CV ECHO MEAS - MV A MAX VEL: 83.9 CM/SEC
BH CV ECHO MEAS - MV E MAX VEL: 74 CM/SEC
BH CV ECHO MEAS - MV E/A: 0.88
BH CV ECHO MEAS - PA ACC SLOPE: 1401 CM/SEC^2
BH CV ECHO MEAS - PA ACC TIME: 0.08 SEC
BH CV ECHO MEAS - PA MAX PG: 4.6 MMHG
BH CV ECHO MEAS - PA PR(ACCEL): 43.3 MMHG
BH CV ECHO MEAS - PA V2 MAX: 107.8 CM/SEC
BH CV ECHO MEAS - RVDD: 2.5 CM
BH CV ECHO MEAS - SI(CUBED): 35.9 ML/M^2
BH CV ECHO MEAS - SI(TEICH): 33.5 ML/M^2
BH CV ECHO MEAS - SV(CUBED): 67.3 ML
BH CV ECHO MEAS - SV(TEICH): 62.8 ML
BH CV ECHO MEAS - TAPSE (>1.6): 2.9 CM2
BH CV VAS BP RIGHT ARM: NORMAL MMHG
BH CV XLRA - RV BASE: 4.2 CM
BH CV XLRA - RV LENGTH: 4.6 CM
BH CV XLRA - RV MID: 3.7 CM
BH CV XLRA - TDI S': 19.9 CM/SEC
E/E' RATIO: 8
LEFT ATRIUM VOLUME INDEX: 24 ML/M2
LEFT ATRIUM VOLUME: 46 CM3
LV EF 2D ECHO EST: 70 %

## 2017-07-17 PROCEDURE — 99233 SBSQ HOSP IP/OBS HIGH 50: CPT | Performed by: INTERNAL MEDICINE

## 2017-07-17 PROCEDURE — 93306 TTE W/DOPPLER COMPLETE: CPT

## 2017-07-17 PROCEDURE — 93306 TTE W/DOPPLER COMPLETE: CPT | Performed by: INTERNAL MEDICINE

## 2017-07-17 PROCEDURE — 25010000002 CEFTRIAXONE PER 250 MG: Performed by: NURSE PRACTITIONER

## 2017-07-17 RX ADMIN — FAMOTIDINE 20 MG: 20 TABLET ORAL at 08:20

## 2017-07-17 RX ADMIN — LISINOPRIL 5 MG: 5 TABLET ORAL at 08:25

## 2017-07-17 RX ADMIN — SODIUM CHLORIDE 50 ML/HR: 9 INJECTION, SOLUTION INTRAVENOUS at 21:56

## 2017-07-17 RX ADMIN — CEFTRIAXONE SODIUM 1 G: 1 INJECTION, SOLUTION INTRAVENOUS at 22:33

## 2017-07-17 RX ADMIN — Medication 2 TABLET: at 17:37

## 2017-07-17 RX ADMIN — ACETAMINOPHEN 650 MG: 325 TABLET, FILM COATED ORAL at 16:18

## 2017-07-17 RX ADMIN — Medication 2 TABLET: at 08:23

## 2017-07-17 RX ADMIN — ATORVASTATIN CALCIUM 10 MG: 10 TABLET, FILM COATED ORAL at 21:56

## 2017-07-17 NOTE — PLAN OF CARE
Problem: Skin Integrity Impairment, Risk/Actual (Adult)  Goal: Identify Related Risk Factors and Signs and Symptoms  Outcome: Ongoing (interventions implemented as appropriate)    Problem: Patient Care Overview (Adult)  Goal: Plan of Care Review  Outcome: Ongoing (interventions implemented as appropriate)

## 2017-07-17 NOTE — PROGRESS NOTES
Adult Nutrition  Assessment/PES    Patient Name:  Omayra Bhardwaj  YOB: 1935  MRN: [id number]  Admit Date:  7/15/2017    Assessment Date:  7/17/2017        Reason for Assessment       07/17/17 1352    Reason for Assessment    Reason For Assessment/Visit multidisciplinary rounds    Time Spent (min) 20              Nutrition/Diet History       07/17/17 1352    Nutrition/Diet History    Reported/Observed By RN;MD    Other Pt stable, ready for dc from neurosurgery standpoint. Transfer to tele and get w/u for syncopal episodes.                    Nutrition Prescription Ordered       07/17/17 1354    Nutrition Prescription PO    Current PO Diet Soft Texture    Texture Ground    Fluid Consistency Thin                Problem/Interventions:                    Nutrition Intervention       07/17/17 1354    Nutrition Intervention    RD/Tech Action Follow Tx progress;Care plan reviewd              Education/Evaluation       07/17/17 1354    Monitor/Evaluation    Monitor Per protocol        Comments:    Electronically signed by:  Kristy Colón RD  07/17/17 1:55 PM

## 2017-07-17 NOTE — PROGRESS NOTES
Discharge Planning Assessment  [facility]     Patient Name: Omayra Bhardwaj  MRN: [id number]  Today's Date: 7/17/2017    Admit Date: 7/15/2017          Discharge Needs Assessment       07/17/17 1137    Living Environment    Lives With alone    Living Arrangements house    Home Accessibility bed and bath on same level    Transportation Available car;family or friend will provide    Living Environment Comment --   Pt is independent with ADL's and still drives.    Living Environment    Provides Primary Care For no one, unable/limited ability to care for self    Primary Care Provided By child(katherine) (specify)   2 daughters assist with care if needed.    Quality Of Family Relationships supportive    Able to Return to Prior Living Arrangements yes    Living Arrangement Comments --   Pt lives alone in as 2 level home. Pt has 2 daughters that assist with care if needed.    Discharge Needs Assessment    Concerns To Be Addressed no discharge needs identified    Readmission Within The Last 30 Days no previous admission in last 30 days    Outpatient/Agency/Support Group Needs --   Pt has been to [facility] outpt for her shoulder that Dr Roberts had arranged.    Equipment Currently Used at Home rollator;grab bar    Discharge Contact Information if Applicable Prabhu Saucedo ([facility]  [phone]            Discharge Plan       07/17/17 1141    Case Management/Social Work Plan    Plan Home with outpt PT at [facility].    Patient/Family In Agreement With Plan yes    Additional Comments CM met with pt and her 2 daughters in room about initial discharge planning, pt lives alone in [facility].  Pt is independent with ADL's and still drives. Pt denies having HH, she does have a rollator and grab bars in the shower. Pt has [employer] thatcovers her medication she gets her medicine filled at [facility]. CM wi ll  cont. to follow as needed.        Discharge Placement     No information found                Demographic  Summary       07/17/17 1134    Referral Information    Admission Type inpatient    Arrived From admitted as an inpatient    Referral Source admission list    Reason For Consult discharge planning    Record Reviewed clinical discipline documentation;history and physical    Contact Information    Permission Granted to Share Information With     Primary Care Physician Information    Name Kitty Romero            Functional Status       07/17/17 1134    Functional Status Prior    Ambulation 0-->independent    Transferring 0-->independent    Toileting 0-->independent    Bathing 0-->independent    Dressing 0-->independent    Eating 0-->independent    Communication 0-->understands/communicates without difficulty    Swallowing 0-->swallows foods/liquids without difficulty    Prior Functional Level Comment --   independent.    IADL    Medications independent    Meal Preparation independent    Housekeeping independent    Laundry independent    Shopping independent    Oral Care independent    IADL Comments --   independent.    Activity Tolerance    Usual Activity Tolerance good            Psychosocial     None            Abuse/Neglect     None            Legal     None            Substance Abuse     None            Patient Forms     None          Brook Praekh RN

## 2017-07-17 NOTE — PROGRESS NOTES
"Intensive Care Follow-up     Hospital:  LOS: 2 days   Ms. Omayra Bhardwaj, 81 y.o. female is followed for:   SAH (subarachnoid hemorrhage)        Subjective   Interval History:  The chart is reviewed. The patient is a bit sore however she states that she is otherwise doing quite well. Eyes any dizziness or chest pain.    The patient's relevant past medical, surgical and social history were reviewed and updated in Epic as appropriate.        Objective     Infusions:    sodium chloride 50 mL/hr Last Rate: Stopped (07/17/17 0800)     Medications:    atorvastatin 10 mg Oral Nightly   ceftriaxone 1 g Intravenous Q24H   famotidine 20 mg Oral Daily   lisinopril 5 mg Oral Q24H   sennosides-docusate sodium 2 tablet Oral BID       Vital Sign Min/Max for last 24 hours  Temp  Min: 98.2 °F (36.8 °C)  Max: 98.8 °F (37.1 °C)   BP  Min: 86/45  Max: 147/65   Pulse  Min: 52  Max: 84   Resp  Min: 16  Max: 18   SpO2  Min: 92 %  Max: 97 %   No Data Recorded       Input/Output for last 24 hour shift  07/16 0701 - 07/17 0700  In: 1170 [P.O.:360; I.V.:810]  Out: 1565 [Urine:1565]      Objective:  General Appearance:  Comfortable, well-appearing and in no acute distress (The patient has ecchymoses and her face from her fall.).    Vital signs: (most recent): Blood pressure 125/66, pulse 72, temperature 98.2 °F (36.8 °C), temperature source Oral, resp. rate 18, height 65\" (165.1 cm), weight 176 lb 6.4 oz (80 kg), SpO2 94 %.  No fever.    Output: Producing urine.    HEENT: (Ecchymoses as above.)    Heart: Normal rate.  Regular rhythm.  S1 normal and S2 normal.  No murmur, gallop or friction rub.   Chest: Symmetric chest wall expansion.   Abdomen: Abdomen is soft and non-distended.  Bowel sounds are normal.   There is no abdominal tenderness.  There is no rebound tenderness.   There is no mass.   Extremities: Normal range of motion.  There is no deformity.    Neurological: Patient is alert and oriented to person, place and time.    Pupils:  " Pupils are equal, round, and reactive to light.  Pupils are equal.   Skin:  Warm.  No rash or cyanosis.               Results from last 7 days  Lab Units 07/15/17  1712   WBC 10*3/mm3 8.50   HEMOGLOBIN g/dL 12.5   PLATELETS 10*3/mm3 185       Results from last 7 days  Lab Units 07/16/17  0445 07/15/17  1712   SODIUM mmol/L 138 137   POTASSIUM mmol/L 3.9 4.4   CO2 mmol/L 24.0 24.0   BUN mg/dL 19 27*   CREATININE mg/dL 0.90 1.10   MAGNESIUM mg/dL 2.3 2.4   PHOSPHORUS mg/dL 3.2  --    GLUCOSE mg/dL 95 122*     Estimated Creatinine Clearance: 51.2 mL/min (by C-G formula based on Cr of 0.9).          I reviewed the patient's results and images.     Assessment/Plan   Impression      Principal Problem:    Traumatic SAH (subarachnoid hemorrhage)  Active Problems:    Hypertensive cardiovascular disease    Dyslipidemia    Chronic hypertension    Depression    Chronic lymphedema    Thyroid nodule    Hyperparathyroidism    Disordered sleep    GERD (gastroesophageal reflux disease)    UTI (urinary tract infection)       Plan        Continue Rocephin for a total of 5 days which would place as at the 19th.  Continue with physical and occupational therapy.  We will plan to transfer her to telemetry today.  I will go ahead and order an echocardiogram to further assess her heart.  I discussed this with her family and the patient herself.  Follow-up labs a been placed for tomorrow morning.     Plan of care and goals reviewed with mulitdisciplinary team at daily rounds.   I discussed the patient's findings and my recommendations with patient, family and nursing staff       Calvin Castillo MD, [facility]  Pulmonary and Critical Care Medicine  07/17/17 1:27 PM

## 2017-07-18 LAB
ANION GAP SERPL CALCULATED.3IONS-SCNC: -1 MMOL/L (ref 3–11)
BASOPHILS # BLD AUTO: 0.03 10*3/MM3 (ref 0–0.2)
BASOPHILS NFR BLD AUTO: 0.6 % (ref 0–1)
BUN BLD-MCNC: 15 MG/DL (ref 9–23)
BUN/CREAT SERPL: 18.8 (ref 7–25)
CALCIUM SPEC-SCNC: 10.5 MG/DL (ref 8.7–10.4)
CHLORIDE SERPL-SCNC: 110 MMOL/L (ref 99–109)
CO2 SERPL-SCNC: 29 MMOL/L (ref 20–31)
CREAT BLD-MCNC: 0.8 MG/DL (ref 0.6–1.3)
DEPRECATED RDW RBC AUTO: 45.3 FL (ref 37–54)
EOSINOPHIL # BLD AUTO: 0.18 10*3/MM3 (ref 0–0.3)
EOSINOPHIL NFR BLD AUTO: 3.6 % (ref 0–3)
ERYTHROCYTE [DISTWIDTH] IN BLOOD BY AUTOMATED COUNT: 13.1 % (ref 11.3–14.5)
GFR SERPL CREATININE-BSD FRML MDRD: 69 ML/MIN/1.73
GLUCOSE BLD-MCNC: 104 MG/DL (ref 70–100)
HCT VFR BLD AUTO: 36.1 % (ref 34.5–44)
HGB BLD-MCNC: 11.5 G/DL (ref 11.5–15.5)
IMM GRANULOCYTES # BLD: 0.01 10*3/MM3 (ref 0–0.03)
IMM GRANULOCYTES NFR BLD: 0.2 % (ref 0–0.6)
LYMPHOCYTES # BLD AUTO: 1.43 10*3/MM3 (ref 0.6–4.8)
LYMPHOCYTES NFR BLD AUTO: 28.4 % (ref 24–44)
MCH RBC QN AUTO: 30.6 PG (ref 27–31)
MCHC RBC AUTO-ENTMCNC: 31.9 G/DL (ref 32–36)
MCV RBC AUTO: 96 FL (ref 80–99)
MONOCYTES # BLD AUTO: 0.53 10*3/MM3 (ref 0–1)
MONOCYTES NFR BLD AUTO: 10.5 % (ref 0–12)
NEUTROPHILS # BLD AUTO: 2.86 10*3/MM3 (ref 1.5–8.3)
NEUTROPHILS NFR BLD AUTO: 56.7 % (ref 41–71)
PLATELET # BLD AUTO: 155 10*3/MM3 (ref 150–450)
PMV BLD AUTO: 9.9 FL (ref 6–12)
POTASSIUM BLD-SCNC: 4.2 MMOL/L (ref 3.5–5.5)
RBC # BLD AUTO: 3.76 10*6/MM3 (ref 3.89–5.14)
SODIUM BLD-SCNC: 138 MMOL/L (ref 132–146)
WBC NRBC COR # BLD: 5.04 10*3/MM3 (ref 3.5–10.8)

## 2017-07-18 PROCEDURE — 25010000002 CEFTRIAXONE PER 250 MG: Performed by: NURSE PRACTITIONER

## 2017-07-18 PROCEDURE — 80048 BASIC METABOLIC PNL TOTAL CA: CPT | Performed by: INTERNAL MEDICINE

## 2017-07-18 PROCEDURE — 85025 COMPLETE CBC W/AUTO DIFF WBC: CPT | Performed by: INTERNAL MEDICINE

## 2017-07-18 PROCEDURE — 97162 PT EVAL MOD COMPLEX 30 MIN: CPT

## 2017-07-18 PROCEDURE — 97166 OT EVAL MOD COMPLEX 45 MIN: CPT

## 2017-07-18 PROCEDURE — 99232 SBSQ HOSP IP/OBS MODERATE 35: CPT | Performed by: INTERNAL MEDICINE

## 2017-07-18 RX ADMIN — LISINOPRIL 5 MG: 5 TABLET ORAL at 08:18

## 2017-07-18 RX ADMIN — Medication 2 TABLET: at 17:01

## 2017-07-18 RX ADMIN — ATORVASTATIN CALCIUM 10 MG: 10 TABLET, FILM COATED ORAL at 21:06

## 2017-07-18 RX ADMIN — CEFTRIAXONE SODIUM 1 G: 1 INJECTION, SOLUTION INTRAVENOUS at 21:06

## 2017-07-18 RX ADMIN — ACETAMINOPHEN 650 MG: 325 TABLET, FILM COATED ORAL at 17:01

## 2017-07-18 RX ADMIN — FAMOTIDINE 20 MG: 20 TABLET ORAL at 08:18

## 2017-07-18 RX ADMIN — ACETAMINOPHEN 650 MG: 325 TABLET, FILM COATED ORAL at 08:22

## 2017-07-18 RX ADMIN — Medication 2 TABLET: at 08:18

## 2017-07-18 RX ADMIN — ACETAMINOPHEN 650 MG: 325 TABLET, FILM COATED ORAL at 00:09

## 2017-07-18 NOTE — PLAN OF CARE
Problem: Skin Integrity Impairment, Risk/Actual (Adult)  Goal: Identify Related Risk Factors and Signs and Symptoms  Outcome: Ongoing (interventions implemented as appropriate)    07/18/17 0419   Skin Integrity Impairment, Risk/Actual   Skin Integrity Impairment, Risk/Actual: Related Risk Factors age extremes       Goal: Skin Integrity/Wound Healing  Outcome: Ongoing (interventions implemented as appropriate)    07/18/17 0419   Skin Integrity Impairment, Risk/Actual (Adult)   Skin Integrity/Wound Healing making progress toward outcome         Problem: Patient Care Overview (Adult)  Goal: Plan of Care Review  Outcome: Ongoing (interventions implemented as appropriate)    07/18/17 0419   Coping/Psychosocial Response Interventions   Plan Of Care Reviewed With patient   Patient Care Overview   Progress improving   Outcome Evaluation   Outcome Summary/Follow up Plan Patient rested well during the night, Tylenol given PRN for pain. Patient up with assistance x1 ambulating to bathroom.          Problem: Fall Risk (Adult)  Goal: Identify Related Risk Factors and Signs and Symptoms  Outcome: Ongoing (interventions implemented as appropriate)    07/18/17 0419   Fall Risk   Fall Risk: Related Risk Factors age-related changes;fatigue/slow reaction;history of falls;gait/mobility problems;environment unfamiliar   Fall Risk: Signs and Symptoms presence of risk factors       Goal: Absence of Falls  Outcome: Ongoing (interventions implemented as appropriate)    07/18/17 0419   Fall Risk (Adult)   Absence of Falls making progress toward outcome

## 2017-07-18 NOTE — PROGRESS NOTES
Hospitalist Daily Progress Note    Date of Admission: 7/15/2017   LOS: 3 days   PCP: Kitty Romero MD    Chief Complaint: s/p fall and SAH    Subjective    Feels better today.  Still has pain in her rib cage and patient takes a deep breath she has pleuritic pain.  Overall markedly improvement.  No fever or chills.  No nausea, vomiting, diarrhea, abdominal pain.  History of Present Illness    Review of Systems  General: no fevers, chills  Respiratory: no cough, dyspnea  Cardiovascular: no chest pain, palpitations  Abdomen: No abdominal pain, nausea, vomiting, or diarrhea  Neurologic: No focal weakness    Objective   Physical Exam:  I have reviewed the vital signs.  Temp:  [98.4 °F (36.9 °C)-98.9 °F (37.2 °C)] 98.9 °F (37.2 °C)  Heart Rate:  [64-70] 64  Resp:  [16] 16  BP: (126-147)/(71-97) 146/71    Objective  General Appearance:    Alert, cooperative, no distress  Head:    Normocephalic, large ecchymosis around orbits bilaterally and also order for headaches.  Eyes:    Sclerae anicteric  Neck:   Supple, no mass  Lungs: Clear to auscultation bilaterally, respirations unlabored  Heart: Regular rate and rhythm, S1 and S2 normal, no murmur, rub or gallop  Abdomen:  Soft, non-tender, bowel sounds active, nondistended  Extremities: No clubbing, cyanosis, or edema to lower extremities  Skin: No rashes   Neurologic: Oriented x3, Normal strength to extremities    Results Review:    I have reviewed the labs, radiology results and diagnostic studies.      Results from last 7 days  Lab Units 07/18/17  0538   WBC 10*3/mm3 5.04   HEMOGLOBIN g/dL 11.5   PLATELETS 10*3/mm3 155       Results from last 7 days  Lab Units 07/18/17  0538   SODIUM mmol/L 138   POTASSIUM mmol/L 4.2   CO2 mmol/L 29.0   CREATININE mg/dL 0.80   GLUCOSE mg/dL 104*       I have reviewed the medications.    ---------------------------------------------------------------------------------------------  Assessment/Plan   Assessment & Plan  Assessment/Problem  List    Principal Problem:     Traumatic SAH (subarachnoid hemorrhage).  Currently asymptomatic.      Hypertensive cardiovascular disease      Dyslipidemia      Chronic hypertension      Depression      Chronic lymphedema      Thyroid nodule      Hyperparathyroidism      Disordered sleep      GERD (gastroesophageal reflux disease)       UTI (urinary tract infection)       Plan  - cont rocephin for now.  -cont current care  - possible rehab placement 2/2 weakness.            DVT prophylaxis:  Discharge Planning: I expect patient to be discharged to rehab in 2-3 days.    Carlo Arias MD 07/18/17 4:02 PM

## 2017-07-18 NOTE — PLAN OF CARE
Problem: Stroke (Hemorrhagic) (Adult)  Goal: Signs and Symptoms of Listed Potential Problems Will be Absent or Manageable (Stroke)  Outcome: Ongoing (interventions implemented as appropriate)    07/18/17 0903   Stroke (Hemorrhagic)   Problems Assessed (Stroke (Hemorrhagic)) muscle tone abnormal;cognitive impairment;motor/sensory impairment   Problems Present (Stroke (Hemorrhagic)) none         Problem: Patient Care Overview (Adult)  Goal: Plan of Care Review  Outcome: Ongoing (interventions implemented as appropriate)    07/18/17 0903   Coping/Psychosocial Response Interventions   Plan Of Care Reviewed With patient   Patient Care Overview   Progress progress toward functional goals as expected   Outcome Evaluation   Outcome Summary/Follow up Plan Pt dem BLE weakness and balance deficits. pt ambulated 150ft with RW with CGA, distance limited by fatigue. PT recommends d/c to inpatient rehab.          Problem: Inpatient Physical Therapy  Goal: Gait Training Goal LTG- PT  Outcome: Ongoing (interventions implemented as appropriate)    07/18/17 0903   Gait Training PT LTG   Gait Training Goal PT LTG, Date Established 07/18/17   Gait Training Goal PT LTG, Time to Achieve 2 wks   Gait Training Goal PT LTG, [place] Level conditional independence   Gait Training Goal PT LTG, Assist Device cane, straight   Gait Training Goal PT LTG, Distance to Achieve 800   Gait Training Goal PT LTG, Outcome goal ongoing       Goal: Dynamic Standing Balance Goal LTG- PT  Outcome: Ongoing (interventions implemented as appropriate)    07/18/17 0903   Dynamic Standing Balance PT LTG   Dynamic Standing Balance PT LTG, Date Established 07/18/17   Dynamic Standing Balance PT LTG, Time to Achieve 2 wks   Dynamic Standing Balance PT LTG, [place] Level conditional independence   Dynamic Standing Balance PT LTG, Outcome goal ongoing

## 2017-07-18 NOTE — PLAN OF CARE
Problem: Patient Care Overview (Adult)  Goal: Plan of Care Review  Outcome: Ongoing (interventions implemented as appropriate)    07/18/17 0908   Coping/Psychosocial Response Interventions   Plan Of Care Reviewed With patient   Outcome Evaluation   Outcome Summary/Follow up Plan OT IE completed. Pt CGA for transfer and fx'l mobility. Recommend rehab at d/c to support return to PLOF. Pt/family considering move to group home, requesting CM assist.         Problem: Inpatient Occupational Therapy  Goal: Bed Mobility Goal LTG- OT  Outcome: Ongoing (interventions implemented as appropriate)    07/18/17 0908   Bed Mobility OT LTG   Bed Mobility OT LTG, Time to Achieve by discharge   Bed Mobility OT LTG, Activity Type all bed mobility   Bed Mobility OT LTG, [place] Level minimum assist (75% patient effort);verbal cues required       Goal: Transfer Training Goal 1 LTG- OT  Outcome: Ongoing (interventions implemented as appropriate)    07/18/17 0908   Transfer Training OT LTG   Transfer Training OT LTG, Time to Achieve by discharge   Transfer Training OT LTG, Activity Type toilet;tub   Transfer Training OT LTG, [place] Level contact guard assist;verbal cues required   Transfer Training OT LTG, Assist Device commode, bedside;walker, rolling   Transfer Training OT LTG, Additional Goal educate for TTB       Goal: Patient Education Goal LTG- OT  Outcome: Ongoing (interventions implemented as appropriate)    07/18/17 0908   Patient Education OT LTG   Patient Education OT LTG, Time to Achieve by discharge   Patient Education OT LTG, Education Type written program;HEP;home safety;aware of neuro deficits   Patient Education OT LTG, Education Understanding demonstrates adequately;verbalizes understanding   Patient Education OT LTG, Additional Goal Pt completing SROM/walk-walks L UE - per outpt PT HEP       Goal: UB Dressing Goal LTG- OT  Outcome: Ongoing (interventions implemented as appropriate)    07/18/17 0908   UB Dressing OT  LTG   UB Dressing Goal OT LTG, Time to Achieve by discharge   UB Dressing Goal OT LTG, [place] Level minimum assist (75% patient effort)   UB Dressing Goal OT LTG, Additional Goal via lei-technique

## 2017-07-18 NOTE — PAYOR COMM NOTE
"Omayra Bhardwaj (81 y.o. Female)   Id # [id number]  Shira Graham RN, BSN  Phone # [phone]  Fax # [phone]    Date of Birth Social Security Number Address Home Phone MRN    [id number]  [id number] [id number] [phone] [id number]    [employer] Marital Status          Christian        Admission Date Admission Type Admitting Provider Attending Provider Department, Room/Bed    7/15/17 Emergency Carlo Arias MD Kalantar, Masoud, MD [address], [address]/1    Discharge Date Discharge Disposition Discharge Destination                      Attending Provider: Carlo Arias MD     Allergies:  No Known Allergies    Isolation:  None   Infection:  None   Code Status:  FULL    Ht:  65\" (165.1 cm)   Wt:  175 lb 8 oz (79.6 kg)    Admission Cmt:  None   Principal Problem:  Traumatic SAH (subarachnoid hemorrhage) [I60.9]                 Active Insurance as of 7/15/2017     Primary Coverage     Payor Plan Insurance Group Employer/Plan Group    HUMANA MEDICARE REPLACEMENT HUMANA MEDICARE REPL [id number]     Payor Plan Address Payor Plan Phone Number Effective From Effective To    [address] [address] [phone] 1/1/2014     [place] [address]       Subscriber Name Subscriber Birth Date Member ID       OMAYRA BHARDWAJ 1935 [id number]                 Emergency Contacts      (Rel.) Home Phone Work Phone Mobile Phone    Prabhu Saucedo (Power of ) -- -- [phone]    Destiney Saucedo (Power of ) -- -- [phone]            Insurance Information                HUMANA MEDICARE REPLACEMENT/HUMANA MEDICARE REPL Phone: [phone]    Subscriber: Omayra Bhardwaj Subscriber#: [id number]    Group#: [id number] Precert#:              History & Physical      Dex Mullen MD at 7/15/2017  9:07 PM          Pulmonary/Critical Care History and Physical Exam     LOS: 0 days   Patient Care Team:  Kitty Romero MD as PCP - " General  Kitty Romero MD as PCP - Family Medicine    Chief Complaint:    Chief Complaint   Patient presents with   • Fall       Subjective     HPI:   80 YO with a PMH of HTN, hyperlipidemia, hypertensive heart disease with prior negative lexiscan 5/28/15 followed by Dr. Spence who passed out in the bathroom after having a bowel movement and had facial / head trauma.  She did loose consciousness for an unknown amount of time and reports that when she awakened there was a large amount of thick blood around her face.  She has been in her usual state of health recently although she does report she had a mild headache this am and has recently had some intermittent facial twitches.      Evaluation in the ED revealed a possible small right temporal subarachnoid hemorrhage, nasal bone fractures and several possible non-displaced sinus fractures.  She apparently was drowsy in the ED intermittently.  CT c-spine showed degenerative changes and grade 1 anterolisthesis of C4 on C5 and C7 on T1 most likely on a degenerative basis.  It was negative for fracture.  CT chest showed no acute traumatic intrathoracic abnormality with minimal tree-in bud nodularity / bronchiolitis in RML which was felt to be age-indeterminate.      History taken from: patient    Past Medical History:   Diagnosis Date   • Chronic hypertension     probably essential.     • Chronic lymphedema     with probable chronic lower extremity venous insufficiency with recent acceptable bilateral lower extremity venous duplex study, March 2013.   • Depression    • Disordered sleep     with prominent snoring and occasional nocturnal hypersomnolence.    • Dyslipidemia      untreated.   • Former tobacco use    • GERD (gastroesophageal reflux disease)    • History of obesity     (BMI 29.5).   • Hypercalcemia    • Hyperparathyroidism     with contemplated parathyroidectomy (Dr. Davis, [facility]).   • Hypertensive cardiovascular disease      probable   • Thyroid nodule     workup pending.       Past Surgical History:   Procedure Laterality Date   • APPENDECTOMY  1945   • BREAST BIOPSY Left    • BREAST EXCISIONAL BIOPSY Left    • CATARACT EXTRACTION  2010    bilateral    • OTHER SURGICAL HISTORY  1986    Laparoscopy   • PARATHYROIDECTOMY     • SQUAMOUS CELL CARCINOMA EXCISION  2010   • TONSILLECTOMY  1942       Family History   Problem Relation Age of Onset   • Cancer Mother    • Breast cancer Neg Hx    • Ovarian cancer Neg Hx        Social History     Social History   • Marital status:      Spouse name: N/A   • Number of children: N/A   • Years of education: N/A     Occupational History   • Not on file.     Social History Main Topics   • Smoking status: Former Smoker     Quit date: 1963   • Smokeless tobacco: Not on file   • Alcohol use No   • Drug use: No   • Sexual activity: Defer     Other Topics Concern   • Not on file     Social History Narrative       No Known Allergies    PMH/FH/SocH were reviewed by me and updates were made.     Review of Systems:    Review of 14 systems was completed with positives and pertinent negatives noted in the subjective section.  All other systems reviewed and are negative.       Musculoskeletal: positive for  joint pain and left shoulder, right knee    Objective     Vital Signs  Temp:  [98.5 °F (36.9 °C)-98.6 °F (37 °C)] 98.6 °F (37 °C)  Heart Rate:  [61-79] 78  Resp:  [15-16] 16  BP: (126-140)/(60-97) 126/68  Body mass index is 30.11 kg/(m^2).       Physical Exam:     General Appearance:    Alert, cooperative, in no acute distress   Head:    Normocephalic, without obvious abnormality, swelling and small laceration above nose / brow ridge.  Periorbital swelling and ecchymosis.     Eyes:            Lids and lashes normal, conjunctivae and sclerae normal, no   icterus, no pallor, corneas clear, PERRL   ENMT:   Ears appear intact with no abnormalities noted      No oral lesions, no thrush, oral mucosa moist,  epistaxis with blood clots in nares bilaterally.    Neck:   No adenopathy, supple, trachea midline, no thyromegaly, no   carotid bruit, no JVD       Lungs/resp:     Normal effort, symmetric chest rise, no crepitus, clear to      auscultation bilaterally, no chest wall tenderness                  Heart/CV:    Regular rhythm and normal rate, normal S1 and S2, no            murmur   Abdomen/GI:     Normal bowel sounds, no masses, no organomegaly, soft        non-tender, non-distended   G/U:     Deferred   Extremities/MSK:   No clubbing or cyanosis, trace bilateral edema.  Normal tone.  No deformities.    Pulses:   Pulses palpable and equal bilaterally   Skin:   Small laceration on dorsum of left hand, no rash   Hem/Lymph:   No cervical or supraclavicular adenopathy.    Neurologic:   Moves all extremities with no obvious focal motor deficit.  Cranial nerves 2 - 12 grossly intact            Psychiatric:  Normal mood and affect, oriented x 3.      Results Review:     I reviewed the patient's new clinical results.     Results from last 7 days  Lab Units 07/15/17  1712   SODIUM mmol/L 137   POTASSIUM mmol/L 4.4   CHLORIDE mmol/L 105   CO2 mmol/L 24.0   BUN mg/dL 27*   CREATININE mg/dL 1.10   CALCIUM mg/dL 11.1*   BILIRUBIN mg/dL 0.5   ALK PHOS U/L 66   ALT (SGPT) U/L 12   AST (SGOT) U/L 21   GLUCOSE mg/dL 122*       Results from last 7 days  Lab Units 07/15/17  1712   WBC 10*3/mm3 8.50   HEMOGLOBIN g/dL 12.5   HEMATOCRIT % 39.1   PLATELETS 10*3/mm3 185           I reviewed the patient's new imaging including images and reports.  The following were reviewed with findings noted in HPI.   CT head  CT facial bones  CT C-Spine  CT chest    Medication Review:     atorvastatin 10 mg Oral Nightly   ceftriaxone 1 g Intravenous Q24H   [START ON 7/16/2017] famotidine 20 mg Oral Daily   sennosides-docusate sodium 2 tablet Oral BID       sodium chloride 50 mL/hr       Assessment/Plan     Hospital Problem List     * (Principal)Traumatic  SAH (subarachnoid hemorrhage)    UTI (urinary tract infection)    GERD (gastroesophageal reflux disease)    Hypertensive cardiovascular disease    Overview Addendum 11/9/2016  1:03 PM by Stacey Ramachandran     Probable  a. Longstanding hypertension with recent increased palpitations and echocardiogram demonstrating reduced LVEF (0.50) with mild TR with abnormal EKG revealing mild nonspecific T-wave changes, winter 2013.   b. Acceptable event recorder with continued medical therapy felt warranted.  c. Nuclear stress test showing a normal Lexiscan with LVEF (0.74), 05/28/2015.  d. Residual class I symptoms.          Dyslipidemia    Overview Signed 11/9/2016 12:53 PM by Stacey Ramachandran      untreated.         Chronic hypertension    Overview Signed 11/9/2016 12:54 PM by Stacey Ramachandran     probably essential.           Depression    Chronic lymphedema    Overview Signed 11/9/2016 12:56 PM by Stacey Ramachandran     with probable chronic lower extremity venous insufficiency with recent acceptable bilateral lower extremity venous duplex study, March 2013.         Thyroid nodule    Overview Signed 11/9/2016 12:56 PM by Stacey Ramachandran     workup pending.         Hyperparathyroidism    Overview Signed 11/9/2016 12:57 PM by Stacey Ramachandran     with contemplated parathyroidectomy (Dr. Davis, [facility]).         Disordered sleep    Overview Signed 11/9/2016 12:59 PM by Stacey Ramachandran     with prominent snoring and occasional nocturnal hypersomnolence.              81-year-old female with hypertension who had a syncopal event after using the bathroom earlier today resulting in a fall with a small subarachnoid hemorrhage and facial fractures.  Labs show some evidence of a urinary tract infection.  Given the subarachnoid hemorrhage as well as some intermittent drowsiness, she will be monitored in the intensive care unit setting.  Dr. Gutierrez with neurosurgery was contacted by the emergency department and will see the  patient.    Plan:  1.  Admit to the intensive care unit.  2.  Rocephin for urinary tract infection.  3.  Avoid anticoagulation including heparin/Lovenox DVT prophylaxis.  4.  SCDs for DVT prophylaxis.  5.  Hourly neuro checks in neuro ICU.  Stat CT head if decline in neurologic status.  6.  Dr. Gutierrez with neurosurgery will see the patient.  7.  Continue home antihypertensives and avoid uncontrolled hypertension.  8.  Hold diuretics for now.      Dex Mullen MD  07/15/17  9:34 PM    45 minutes of critical care provided. This time excludes other billable procedures. Time does include preparation of documents, medical consultations, review of old records, and direct bedside care. Patient was at high risk for life-threatening deterioration due to subarachnoid hemorrhage.          Electronically signed by Dex Mullen MD at 7/15/2017 10:11 PM           Emergency Department Notes      Samuel Morris MD at 7/15/2017  5:16 PM      Procedure Orders:    1. Critical Care [[id number]] ordered by Samuel Morris MD at 07/16/17 0032                Subjective   HPI Comments: Ms. Omayra Bhardwaj is a 81 y.o. female who presents to the ED with c/o a fall. Her daughter reports that she went to visit the patient earlier today around 1500 and at the time she felt normal. However, after they left at 1600 the patient went to the restroom to have a bowel movement and upon getting off the toilet she became dizzy and had a fall. She states that she did not have LOC but did hit her head during the fall. She was then able to crawl into the hallway and use the phone to call for help. She currently complains of a nose bleed, and left rib pain. She denies neck , back , hip and leg pain. No other acute complaints at this time.  Patient is a 81 y.o. female presenting with fall.   History provided by:  Relative and patient  Fall   Mechanism of injury: fall    Injury location:  Face  Facial injury location:  Nose  Incident  location:  Bathroom  Time since incident:  1 hour  Fall:     Fall occurred:  Standing    Point of impact:  Face    Entrapped after fall: no    Protective equipment: none    Suspicion of alcohol use: no    Suspicion of drug use: no    Prior to arrival data:     Loss of consciousness: no      Amnesic to event: no    Associated symptoms: chest pain    Associated symptoms: no abdominal pain, no back pain, no difficulty breathing, no loss of consciousness and no neck pain        Review of Systems   HENT: Positive for nosebleeds.    Cardiovascular: Positive for chest pain.   Gastrointestinal: Negative for abdominal pain.   Musculoskeletal: Negative for back pain and neck pain.   Skin: Positive for wound (Skin tear to left hand).   Neurological: Negative for loss of consciousness.   All other systems reviewed and are negative.      Past Medical History:   Diagnosis Date   • Chronic hypertension     probably essential.     • Chronic lymphedema     with probable chronic lower extremity venous insufficiency with recent acceptable bilateral lower extremity venous duplex study, March 2013.   • Depression    • Disordered sleep     with prominent snoring and occasional nocturnal hypersomnolence.    • Dyslipidemia      untreated.   • Former tobacco use    • GERD (gastroesophageal reflux disease)    • History of obesity     (BMI 29.5).   • Hypercalcemia    • Hyperparathyroidism     with contemplated parathyroidectomy (Dr. Davis, [facility]).   • Hypertensive cardiovascular disease     probable   • Thyroid nodule     workup pending.       No Known Allergies    Past Surgical History:   Procedure Laterality Date   • APPENDECTOMY  1945   • BREAST BIOPSY Left    • BREAST EXCISIONAL BIOPSY Left    • CATARACT EXTRACTION  2010    bilateral    • OTHER SURGICAL HISTORY  1986    Laparoscopy   • PARATHYROIDECTOMY     • SQUAMOUS CELL CARCINOMA EXCISION  2010   • TONSILLECTOMY  1942       Family History   Problem  Relation Age of Onset   • Cancer Mother    • Breast cancer Neg Hx    • Ovarian cancer Neg Hx        Social History     Social History   • Marital status:      Spouse name: N/A   • Number of children: N/A   • Years of education: N/A     Social History Main Topics   • Smoking status: Former Smoker     Quit date: 1963   • Smokeless tobacco: None   • Alcohol use No   • Drug use: No   • Sexual activity: Defer     Other Topics Concern   • None     Social History Narrative         Objective   Physical Exam   Constitutional: She is oriented to person, place, and time. She appears well-developed and well-nourished. No distress.   HENT:   Head: Normocephalic. Head is with abrasion.   Nose: No nasal septal hematoma.   Swelling of the bridge of the nose.  Patient has blood coming out of both nares but no septal hematoma is noted.     Eyes: Conjunctivae and EOM are normal. Pupils are equal, round, and reactive to light. No scleral icterus.   Neck: Normal range of motion. Neck supple.   Cardiovascular: Normal rate, regular rhythm, normal heart sounds and intact distal pulses.    No murmur heard.  Pulmonary/Chest: Effort normal and breath sounds normal. No respiratory distress. She exhibits tenderness (Left lateral chest wall tenderness ).   Abdominal: Soft. Bowel sounds are normal. There is no tenderness.   Musculoskeletal: Normal range of motion.   Neurological: She is alert and oriented to person, place, and time. No cranial nerve deficit. She exhibits normal muscle tone. Coordination normal.   Skin: Skin is warm and dry. Abrasion and bruising noted. She is not diaphoretic.   Patient has a skin tear to the dorsum of her left hand. She also has a midline hematoma to her forehead with small abrasions, as well as bruising and swelling on the bridge of her nose with another deep abrasion. She also has several abrasions to her left cheek.   Psychiatric: She has a normal mood and affect. Her behavior is normal.   Nursing note  and vitals reviewed.      Critical Care  Performed by: NISREEN GASTELUM  Authorized by: NISREEN GASTELUM   Total critical care time: 35 minutes  Critical care time was exclusive of separately billable procedures and treating other patients.  Critical care was necessary to treat or prevent imminent or life-threatening deterioration of the following conditions: trauma.  Critical care was time spent personally by me on the following activities: discussions with consultants, evaluation of patient's response to treatment, examination of patient, obtaining history from patient or surrogate, ordering and performing treatments and interventions, ordering and review of laboratory studies, ordering and review of radiographic studies and re-evaluation of patient's condition.              ED Course  ED Course     EKG NSR.  GCS 15.  Pt with extensive facial swelling.  Chest wall tenderness without crepitus.  Lungs clear.  Imaging shows small SAH and some facial fractures but no other major injuries.  Serial rechecks, pt is a little sleepy but easily alerts and is fully oriented and neuro intact.  On later exams she is completely alert again.  I discussed with Dr Gutierrez who will see pt in consult.  Labs reviewed.  It sounds like her fall was a vagal episode.  Pt admitted to ICU for further management.                  MDM  Number of Diagnoses or Management Options  Chest wall contusion, left, initial encounter:   Closed fracture of nasal bone, initial encounter:   Maxillary sinus fracture, closed, initial encounter:   Subarachnoid hemorrhage, traumatic, without loss of consciousness, initial encounter:      Amount and/or Complexity of Data Reviewed  Clinical lab tests: reviewed and ordered  Tests in the radiology section of CPT®:  ordered and reviewed  Decide to obtain previous medical records or to obtain history from someone other than the patient: yes  Obtain history from someone other than the patient: yes  Discuss  the patient with other providers: yes  Independent visualization of images, tracings, or specimens: yes    Critical Care  Total time providing critical care: 30-74 minutes      Final diagnoses:   Subarachnoid hemorrhage, traumatic, without loss of consciousness, initial encounter   Closed fracture of nasal bone, initial encounter   Maxillary sinus fracture, closed, initial encounter   Chest wall contusion, left, initial encounter       Documentation assistance provided by lola Escobedo.  Information recorded by the scribe was done at my direction and has been verified and validated by me.     Teresa Escobedo  07/15/17 1743       Samuel Morris MD  07/16/17 0034       Electronically signed by Samuel Morris MD at 7/16/2017 12:34 AM        Operative/Procedure Notes (last 72 hours) (Notes from 7/15/2017  7:40 AM through 7/18/2017  7:40 AM)     No notes of this type exist for this encounter.           Physician Progress Notes (last 72 hours) (Notes from 7/15/2017  7:40 AM through 7/18/2017  7:40 AM)      Calvin Castillo MD at 7/17/2017  1:27 PM  Version 1 of 1         Intensive Care Follow-up     Hospital:  LOS: 2 days   Ms. Omayra Bhardwaj, 81 y.o. female is followed for:   SAH (subarachnoid hemorrhage)     Subjective  Subjective   Interval History:  The chart is reviewed. The patient is a bit sore however she states that she is otherwise doing quite well. Eyes any dizziness or chest pain.    The patient's relevant past medical, surgical and social history were reviewed and updated in Epic as appropriate.     Objective  Objective     Infusions:    sodium chloride 50 mL/hr Last Rate: Stopped (07/17/17 0800)     Medications:    atorvastatin 10 mg Oral Nightly   ceftriaxone 1 g Intravenous Q24H   famotidine 20 mg Oral Daily   lisinopril 5 mg Oral Q24H   sennosides-docusate sodium 2 tablet Oral BID       Vital Sign Min/Max for last 24 hours  Temp  Min: 98.2 °F (36.8 °C)  Max: 98.8 °F (37.1 °C)  "  BP  Min: 86/45  Max: 147/65   Pulse  Min: 52  Max: 84   Resp  Min: 16  Max: 18   SpO2  Min: 92 %  Max: 97 %   No Data Recorded       Input/Output for last 24 hour shift  07/16 0701 - 07/17 0700  In: 1170 [P.O.:360; I.V.:810]  Out: 1565 [Urine:1565]      Objective:  General Appearance:  Comfortable, well-appearing and in no acute distress (The patient has ecchymoses and her face from her fall.).    Vital signs: (most recent): Blood pressure 125/66, pulse 72, temperature 98.2 °F (36.8 °C), temperature source Oral, resp. rate 18, height 65\" (165.1 cm), weight 176 lb 6.4 oz (80 kg), SpO2 94 %.  No fever.    Output: Producing urine.    HEENT: (Ecchymoses as above.)    Heart: Normal rate.  Regular rhythm.  S1 normal and S2 normal.  No murmur, gallop or friction rub.   Chest: Symmetric chest wall expansion.   Abdomen: Abdomen is soft and non-distended.  Bowel sounds are normal.   There is no abdominal tenderness.  There is no rebound tenderness.   There is no mass.   Extremities: Normal range of motion.  There is no deformity.    Neurological: Patient is alert and oriented to person, place and time.    Pupils:  Pupils are equal, round, and reactive to light.  Pupils are equal.   Skin:  Warm.  No rash or cyanosis.               Results from last 7 days  Lab Units 07/15/17  1712   WBC 10*3/mm3 8.50   HEMOGLOBIN g/dL 12.5   PLATELETS 10*3/mm3 185       Results from last 7 days  Lab Units 07/16/17  0445 07/15/17  1712   SODIUM mmol/L 138 137   POTASSIUM mmol/L 3.9 4.4   CO2 mmol/L 24.0 24.0   BUN mg/dL 19 27*   CREATININE mg/dL 0.90 1.10   MAGNESIUM mg/dL 2.3 2.4   PHOSPHORUS mg/dL 3.2  --    GLUCOSE mg/dL 95 122*     Estimated Creatinine Clearance: 51.2 mL/min (by C-G formula based on Cr of 0.9).          I reviewed the patient's results and images.     Assessment/Plan   Impression      Principal Problem:    Traumatic SAH (subarachnoid hemorrhage)  Active Problems:    Hypertensive cardiovascular disease    Dyslipidemia    " Chronic hypertension    Depression    Chronic lymphedema    Thyroid nodule    Hyperparathyroidism    Disordered sleep    GERD (gastroesophageal reflux disease)    UTI (urinary tract infection)       Plan        Continue Rocephin for a total of 5 days which would place as at the 19th.  Continue with physical and occupational therapy.  We will plan to transfer her to telemetry today.  I will go ahead and order an echocardiogram to further assess her heart.  I discussed this with her family and the patient herself.  Follow-up labs a been placed for tomorrow morning.     Plan of care and goals reviewed with mulitdisciplinary team at daily rounds.   I discussed the patient's findings and my recommendations with patient, family and nursing staff       Calvin Castillo MD, [facility]  Pulmonary and Critical Care Medicine  07/17/17 1:27 PM        Electronically signed by Calvin Castillo MD at 7/17/2017  1:30 PM           Consult Notes (last 72 hours) (Notes from 7/15/2017  7:40 AM through 7/18/2017  7:40 AM)      Roverto Gutierrez MD at 7/16/2017  8:47 AM  Version 1 of 1     Consult Orders:    1. Inpatient Consult to Neurosurgery [[id number]] ordered by KT Che at 07/15/17 2120                Reason for consultation: Traumatic subarachnoid hemorrhage    History of present illness:  This is an 81-year-old woman who suffered a syncopal events and subsequent fall yesterday.  She was brought to [facility] where she had a CT scan of the head which demonstrated a small amount of temporal lobe traumatic subarachnoid hemorrhage.  She was admitted to the intensive care unit for observation, and neurosurgical consultation was requested.    ROS:  A 12 point review of systems was performed.  All systems reviewed were negative with the exception of those mentioned in the history of present illness.    Past medical history:  Past Medical History:   Diagnosis Date   • Chronic hypertension     probably  essential.     • Chronic lymphedema     with probable chronic lower extremity venous insufficiency with recent acceptable bilateral lower extremity venous duplex study, March 2013.   • Depression    • Disordered sleep     with prominent snoring and occasional nocturnal hypersomnolence.    • Dyslipidemia      untreated.   • Former tobacco use    • GERD (gastroesophageal reflux disease)    • History of obesity     (BMI 29.5).   • Hypercalcemia    • Hyperparathyroidism     with contemplated parathyroidectomy (Dr. Davis, [facility]).   • Hypertensive cardiovascular disease     probable   • Thyroid nodule     workup pending.       Past surgical history:  Past Surgical History:   Procedure Laterality Date   • APPENDECTOMY  1945   • BREAST BIOPSY Left    • BREAST EXCISIONAL BIOPSY Left    • CATARACT EXTRACTION  2010    bilateral    • OTHER SURGICAL HISTORY  1986    Laparoscopy   • PARATHYROIDECTOMY     • SQUAMOUS CELL CARCINOMA EXCISION  2010   • TONSILLECTOMY  1942       Social history:  Social History     Social History   • Marital status:      Spouse name: N/A   • Number of children: N/A   • Years of education: N/A     Occupational History   • Not on file.     Social History Main Topics   • Smoking status: Former Smoker     Quit date: 1963   • Smokeless tobacco: Not on file   • Alcohol use No   • Drug use: No   • Sexual activity: Defer     Other Topics Concern   • Not on file     Social History Narrative       Family history:  Family History   Problem Relation Age of Onset   • Cancer Mother    • Breast cancer Neg Hx    • Ovarian cancer Neg Hx        Allergies:  No Known Allergies    Outpatient medications:  Scheduled Meds:  atorvastatin 10 mg Oral Nightly   ceftriaxone 1 g Intravenous Q24H   famotidine 20 mg Oral Daily   lisinopril 5 mg Oral Q24H   sennosides-docusate sodium 2 tablet Oral BID     Continuous Infusions:  sodium chloride 50 mL/hr Last Rate: 50 mL/hr (07/15/17 0248)  "    PRN Meds:.•  acetaminophen  •  labetalol  •  ondansetron  •  sodium chloride  •  sodium chloride    Physical Examination:  General:  Vitals:    17 0701   BP: 101/50   Pulse: 60   Resp:    Temp:    SpO2: 94%     Systolic (24hrs), Av , Min:94 , Max:140     Diastolic (24hrs), Av, Min:46, Max:97      Temp (24hrs), Av.9 °F (37.2 °C), Min:98.5 °F (36.9 °C), Max:99.6 °F (37.6 °C)      Neurological: Cranial nerves II through XII are grossly intact.  Unable to assess finger to nose testing or pronator drift due to left shoulder trauma  Sensation is intact to light touch throughout.  Dysphonic speech secondary to nasal swelling  Musculoskeletal: Age-appropriate, full motor strength in all 4 extremities, unable to assess proximal strength in the left upper extremity due to shoulder pain  Vascular: 2+ radial pulses bilaterally.  Cardiovascular: Regular rate and rhythm.  Extremities: No clubbing, cyanosis, or edema.  Pulmonary: Normal effort and excursion.  No evidence of respiratory distress.  Psychiatric: Normal mood and affect  HEENT: Raccoon's eyes.  Multiple facial lacerations, ecchymosis, and swelling.  Eyes: No scleral icterus.  Extraocular eye movements are intact, and pupils are equal, round, and reactive to light.    Imaging:  A noncontrast head CT of the brain that was performed yesterday is reviewed.  According to the interpreting radiologist, there is \"suggestion of minimal subarachnoid hemorrhage in the right temporal region, images 13-15 series 3.  No evidence of mass effect.  No midline shift.  There is generalized cerebral atrophy.  \"      Assessment and Plan:  This is an 81-year-old woman who suffered a vertiginous episode yesterday when she was getting up from the toilet and fell face first striking her head.  She has a tiny, traumatic subarachnoid hemorrhage area did she has returned to her neurological baseline.  She does not complain of significant symptoms of vertebrobasilar " insufficiency, and she is actually quite active doing samir chi a regular basis.  I don't think that workup for vertebrobasilar insufficiency is probably indicated at this point area if she does complain of significant vertigo, then my next course of action would be to order a MRA of the head neck to assess for basilar or vertebrobasilar stenosis.    She can follow-up with me on an as-needed basis.  I did review the signs and symptoms of vertebrobasilar insufficiency and postconcussive syndrome.  From a neurosurgical standpoint, she can safely be discharged home from my standpoint today.       Electronically signed by Roverto Gutierrez MD at 7/16/2017  8:57 AM

## 2017-07-18 NOTE — THERAPY EVALUATION
Acute Care - Physical Therapy Initial Evaluation  [facility]     Patient Name: Omayra Bhardwaj  : 1935  MRN: [id number]  Today's Date: 2017   Onset of Illness/Injury or Date of Surgery Date: 07/15/17  Date of Referral to PT: 17  Referring Physician: Jonathan      Admit Date: 7/15/2017     Visit Dx:    ICD-10-CM ICD-9-CM   1. Subarachnoid hemorrhage, traumatic, without loss of consciousness, initial encounter S06.6X0A 852.01   2. Closed fracture of nasal bone, initial encounter S02.2XXA 802.0   3. Maxillary sinus fracture, closed, initial encounter S02.401A 802.4   4. Chest wall contusion, left, initial encounter S20.212A 922.1   5. Impaired mobility and ADLs Z74.09 799.89   6. Impaired functional mobility, balance, gait, and endurance Z74.09 V49.89     Patient Active Problem List   Diagnosis   • Hypertensive cardiovascular disease   • Dyslipidemia   • Chronic hypertension   • History of obesity   • Depression   • Chronic lymphedema   • Thyroid nodule   • Hyperparathyroidism   • Disordered sleep   • GERD (gastroesophageal reflux disease)   • Traumatic SAH (subarachnoid hemorrhage)   • Subarachnoid hemorrhage, traumatic   • UTI (urinary tract infection)     Past Medical History:   Diagnosis Date   • Chronic hypertension     probably essential.     • Chronic lymphedema     with probable chronic lower extremity venous insufficiency with recent acceptable bilateral lower extremity venous duplex study, 2013.   • Depression    • Disordered sleep     with prominent snoring and occasional nocturnal hypersomnolence.    • Dyslipidemia      untreated.   • Former tobacco use    • GERD (gastroesophageal reflux disease)    • History of obesity     (BMI 29.5).   • Hypercalcemia    • Hyperparathyroidism     with contemplated parathyroidectomy (Dr. Davis, [facility]).   • Hypertensive cardiovascular disease     probable   • Thyroid nodule     workup pending.     Past Surgical  History:   Procedure Laterality Date   • APPENDECTOMY  1945   • BREAST BIOPSY Left    • BREAST EXCISIONAL BIOPSY Left    • CATARACT EXTRACTION  2010    bilateral    • OTHER SURGICAL HISTORY  1986    Laparoscopy   • PARATHYROIDECTOMY     • SQUAMOUS CELL CARCINOMA EXCISION  2010   • TONSILLECTOMY  1942          PT ASSESSMENT (last 72 hours)      PT Evaluation       07/18/17 0807 07/18/17 0806    Rehab Evaluation    Document Type evaluation  -TB evaluation  -SJ    Subjective Information agree to therapy;complains of;weakness;pain  -TB agree to therapy;complains of;pain  -SJ    Patient Effort, Rehab Treatment good  -TB excellent  -SJ    Symptoms Noted During/After Treatment fatigue  -TB none  -SJ    Symptoms Noted Comment Pt reports decline in activity tolerance  -TB     General Information    Patient Profile Review yes  -TB yes  -SJ    Onset of Illness/Injury or Date of Surgery Date 07/15/17  -TB 07/15/17  -SJ    Referring Physician Jonathan  -TB Castillo  -SJ    General Observations Pt rec'vd UIC, room air, IV intact  -TB Pleasant elderly female seated in recliner in room, significant facial bruising noted; IV, tele  -SJ    Pertinent History Of Current Problem Pt to ED s/p fall at home following a syncopal episode. CT (+) temporal lobe SAH. Imaging (+) facial fracture. UTI  -TB 81 y.o.F passed out while on toilet at home and sustained facial/head trauma, small subarachnoid hemmorhage and nasal bone fx  -SJ    Precautions/Limitations fall precautions  -TB fall precautions  -SJ    Prior Level of Function independent:;all household mobility;community mobility;transfer;bed mobility;ADL's;home management;driving;shopping;using stairs;work;dependent:   laundry/basement  -TB independent:;all household mobility;community mobility;gait;transfer;bed mobility;ADL's;driving   pt very active, does Adam Chi, states she could walk down str  -SJ    Equipment Currently Used at Home grab bar;rollator  -TB rollator;grab bar  -SJ     Plans/Goals Discussed With patient;agreed upon  -TB patient;agreed upon  -SJ    Risks Reviewed patient:;LOB;increased discomfort;lines disloged  -TB patient:;LOB;dizziness;increased discomfort;change in vital signs;lines disloged  -SJ    Benefits Reviewed patient:;improve function;increase independence;decrease pain;increase knowledge  -TB patient:;improve function;increase independence;increase strength;increase balance;increase knowledge  -SJ    Barriers to Rehab previous functional deficit  -TB none identified  -SJ    Living Environment    Lives With alone  -TB alone  -SJ    Living Arrangements house  -TB house  -SJ    Home Accessibility bed and bath on same level;tub/shower is not walk in;stairs to enter home;stairs within home  -TB stairs to enter home;bed and bath on same level;stairs within home  -SJ    Number of Stairs to Enter Home  2  -SJ    Stair Railings at Home  none  -SJ    Living Environment Comment Pt lives in 2-story on basement; lives on main level; dtr does laundry in basement  -TB Pt does not use stairs in home  -SJ    Clinical Impression    Date of Referral to PT  07/17/17  -SJ    PT Diagnosis  impaired mobility  -SJ    Patient/Family Goals Statement  return home to prior activities  -SJ    Criteria for Skilled Therapeutic Interventions Met  yes;treatment indicated  -SJ    Pathology/Pathophysiology Noted (Describe Specifically for Each System)  musculoskeletal  -SJ    Impairments Found (describe specific impairments)  gait, locomotion, and balance  -SJ    Functional Limitations in Following Categories (Describe Specific Limitations)  self-care;home management;community/leisure  -SJ    Rehab Potential  good, to achieve stated therapy goals  -SJ    Predicted Duration of Therapy Intervention (days/wks)  2wks  -SJ    Vital Signs    Pre Systolic BP Rehab  145  -SJ    Pre Treatment Diastolic BP  80  -SJ    Intra Systolic BP Rehab 146  -TB     Intra Treatment Diastolic BP 71  -TB     Post Systolic BP  Rehab  146  -SJ    Post Treatment Diastolic BP  71  -SJ    Pretreatment Heart Rate (beats/min)  71  -SJ    Posttreatment Heart Rate (beats/min)  69  -SJ    O2 Delivery Post Treatment room air  -TB     Pre Patient Position Sitting  -TB Sitting  -SJ    Intra Patient Position Standing  -TB Standing  -SJ    Post Patient Position Sitting  -TB Sitting  -SJ    Pain Assessment    Pain Assessment 0-10  -TB 0-10  -SJ    Pain Score 7  -TB 7  -SJ    Post Pain Score 8  -TB 7  -SJ    Pain Type Acute pain  -TB Acute pain  -SJ    Pain Location Rib cage  -TB Rib cage  -SJ    Pain Intervention(s) Ambulation/increased activity;Repositioned  -TB Medication (See MAR);Repositioned;Ambulation/increased activity  -SJ    Response to Interventions Pt tolerated activity  -TB     Vision Assessment/Intervention    Visual Impairment WFL with corrective lenses  -TB     Cognitive Assessment/Intervention    Current Cognitive/Communication Assessment functional  -TB functional  -SJ    Orientation Status oriented x 4  -TB oriented x 4  -SJ    Follows Commands/Answers Questions able to follow single-step instructions;100% of the time  -% of the time;able to follow multi-step instructions  -SJ    Personal Safety WNL/WFL  -TB mild impairment;decreased awareness, need for safety  -SJ    Personal Safety Interventions fall prevention program maintained;gait belt;nonskid shoes/slippers when out of bed  -TB gait belt;muscle strengthening facilitated;nonskid shoes/slippers when out of bed  -SJ    Short/Long Term Memory intact short term memory;intact long term memory  -TB     ROM (Range of Motion)    General ROM upper extremity range of motion deficits identified  -TB no range of motion deficits identified   BLE's  -SJ    General ROM Detail R UE WFL; L UE Shoulder deficit - pt going to outpt PT to address prior to admission  -TB     General UE Assessment    ROM shoulder, left: UE ROM deficit  -TB     ROM Detail L elbow/wrist/hand WFL  -TB     MMT  (Manual Muscle Testing)    General MMT Assessment upper extremity strength deficits identified  -TB lower extremity strength deficits identified  -SJ    General MMT Assessment Detail R UE WFL for age/ADL/AD use 4/5; L Shoulder deferred; L elbow/wrist/hand 3+/5  -TB bilat hip flex 4+/5, hip abd/add 4/5, knee flex/ext 4+/5  -SJ    Bed Mobility, Assessment/Treatment    Bed Mob, Supine to Sit, [place] not tested  -TB     Bed Mob, Sit to Supine, [place] not tested  -TB     Bed Mobility, Comment Pt rec'vd UIC  -TB UIC  -SJ    Transfer Assessment/Treatment    Transfers, Sit-Stand [place] contact guard assist;verbal cues required  -TB contact guard assist;verbal cues required  -SJ    Transfers, Stand-Sit [place] contact guard assist;verbal cues required  -TB verbal cues required;contact guard assist  -SJ    Transfers, Sit-Stand-Sit, Assist Device rolling walker  -TB rolling walker  -SJ    Transfer, Safety Issues  weight-shifting ability decreased  -SJ    Transfer, Impairments strength decreased;pain  -TB strength decreased;impaired balance;pain  -SJ    Transfer, Comment cues for hand placement  -TB safety with lines  -SJ    Gait Assessment/Treatment    Gait, [place] Level  contact guard assist;verbal cues required  -SJ    Gait, Assistive Device  rolling walker  -SJ    Gait, Distance (Feet)  150  -SJ    Gait, Gait Pattern Analysis  swing-through gait  -SJ    Gait, Gait Deviations  antalgic  -SJ    Gait, Safety Issues  step length decreased;weight-shifting ability decreased;balance decreased during turns  -SJ    Gait, Impairments  strength decreased;impaired balance;pain  -SJ    Gait, Comment  Pt stated she was tired after ambulating  -SJ    Motor Skills/Interventions    Additional Documentation  Balance Skills Training (Group)  -SJ    Balance Skills Training    Sitting-Level of Assistance Distant supervision  -TB Close supervision  -SJ    Sitting-Balance Support Feet supported  -TB Right upper  extremity supported;Left upper extremity supported  -    Sitting-Balance Activities Lateral lean;Forward lean;Reaching for objects;Reaching across midline   ADL tasks  -TB     Standing-Level of Assistance Contact guard  - Contact guard  -    Static Standing Balance Support assistive device  - assistive device  -    Standing-Balance Activities Weight Shift R-L  -TB     Standing Balance # of Minutes 3  -TB     Gait Balance-Level of Assistance  Contact guard  -    Gait Balance Support  assistive device  -    Therapy Exercises    Right Upper Extremity AROM:;sitting;shoulder extension/flexion;shoulder abduction/adduction;shoulder horizontal abd/add;shoulder ER/IR;elbow flexion/extension;pronation/supination;hand pumps  -     Left Upper Extremity AAROM:;shoulder extension/flexion;shoulder abduction/adduction;AROM:;elbow flexion/extension;pronation/supination;hand pumps   SROM  -     Fine Motor Coordination Training    Opposition Right:;Left:;intact  -TB     Sensory Assessment/Intervention    Light Touch LUE;RUE  -TB     LUE Light Touch WNL  -TB     RUE Light Touch WNL  -     Positioning and Restraints    Pre-Treatment Position sitting in chair/recliner  -TB sitting in chair/recliner  -    Post Treatment Position chair  -TB chair  -SJ    In Chair notified nsg;sitting;call light within reach;encouraged to call for assist   Pt set up with breakfast meal  -TB notified nsg;sitting;call light within reach;encouraged to call for assist  -SJ      07/18/17 0800 07/17/17 4926    Muscle Tone Assessment    Muscle Tone Assessment Bilateral Upper Extremities;Bilateral Lower Extremities  -LG Bilateral Upper Extremities;Bilateral Lower Extremities  -AB    Bilateral Upper Extremities Muscle Tone Assessment associated movements noted  -LG associated movements noted  -AB    Bilateral Lower Extremities Muscle Tone Assessment associated movements noted  -LG associated movements noted  -AB      07/17/17 1137 07/15/17 2100     General Information    Equipment Currently Used at Home rollator;grab bar  -DK none  -RS    Living Environment    Lives With alone  -DK alone  -RS    Living Arrangements house  -DK house  -RS    Home Accessibility bed and bath on same level  -DK no concerns  -RS    Stair Railings at Home  none  -RS    Type of Financial/Environmental Concern  none  -RS    Transportation Available car;family or friend will provide  -DK car  -RS    Living Environment Comment --   Pt is independent with ADL's and still drives.  -DK       User Key  (r) = Recorded By, (t) = Taken By, (c) = Cosigned By    Initials Name Provider Type    TB Latonya Ramírez, OT Occupational Therapist     Jenny Bryant, PT Physical Therapist    RS Uriel León, RN Registered Nurse    SYLWIA Parekh, JEVON Case Manager    AB Ameena Cummings, RN Registered Nurse    ZEYNEP Stinson, RN Registered Nurse          Physical Therapy Education     Title: PT OT SLP Therapies (Active)     Topic: Physical Therapy (Active)     Point: Mobility training (Active)    Learning Progress Summary    Learner Readiness Method Response Comment Documented by Status   Patient Acceptance E,D NR   07/18/17 0907 Active               Point: Home exercise program (Active)    Learning Progress Summary    Learner Readiness Method Response Comment Documented by Status   Patient Acceptance E,D NR   07/18/17 0907 Active               Point: Body mechanics (Active)    Learning Progress Summary    Learner Readiness Method Response Comment Documented by Status   Patient Acceptance E,D NR   07/18/17 0907 Active               Point: Precautions (Active)    Learning Progress Summary    Learner Readiness Method Response Comment Documented by Status   Patient Acceptance E,D NR   07/18/17 0907 Active                      User Key     Initials Effective Dates Name Provider Type Discipline     06/19/15 -  Jenny Bryant, PT Physical Therapist PT                PT  Recommendation and Plan  Anticipated Discharge Disposition: inpatient rehabilitation facility  Planned Therapy Interventions: balance training, gait training, home exercise program, patient/family education, strengthening, transfer training  PT Frequency: daily  Plan of Care Review  Plan Of Care Reviewed With: patient  Progress: progress toward functional goals as expected  Outcome Summary/Follow up Plan: Pt dem BLE weakness and balance deficits. pt ambulated 150ft with RW with CGA, distance limited by fatigue. PT recommends d/c to inpatient rehab.           IP PT Goals       07/18/17 0903          Gait Training PT LTG    Gait Training Goal PT LTG, Date Established 07/18/17  -SJ      Gait Training Goal PT LTG, Time to Achieve 2 wks  -SJ      Gait Training Goal PT LTG, [place] Level conditional independence  -SJ      Gait Training Goal PT LTG, Assist Device cane, straight  -SJ      Gait Training Goal PT LTG, Distance to Achieve 800  -SJ      Gait Training Goal PT LTG, Outcome goal ongoing  -SJ      Dynamic Standing Balance PT LTG    Dynamic Standing Balance PT LTG, Date Established 07/18/17  -SJ      Dynamic Standing Balance PT LTG, Time to Achieve 2 wks  -SJ      Dynamic Standing Balance PT LTG, [place] Level conditional independence  -SJ      Dynamic Standing Balance PT LTG, Outcome goal ongoing  -SJ        User Key  (r) = Recorded By, (t) = Taken By, (c) = Cosigned By    Initials Name Provider Type    EDD Bryant PT Physical Therapist                Outcome Measures       07/18/17 0807 07/18/17 0806       How much help from another person do you currently need...    Turning from your back to your side while in flat bed without using bedrails?  3  -SJ     Moving from lying on back to sitting on the side of a flat bed without bedrails?  3  -SJ     Moving to and from a bed to a chair (including a wheelchair)?  3  -SJ     Standing up from a chair using your arms (e.g., wheelchair, bedside chair)?  3   -SJ     Climbing 3-5 steps with a railing?  3  -SJ     To walk in hospital room?  3  -SJ     AM-PAC 6 Clicks Score  18  -SJ     How much help from another is currently needed...    Putting on and taking off regular lower body clothing? 3  -TB      Bathing (including washing, rinsing, and drying) 3  -TB      Toileting (which includes using toilet bed pan or urinal) 3  -TB      Putting on and taking off regular upper body clothing 3  -TB      Taking care of personal grooming (such as brushing teeth) 3  -TB      Eating meals 4  -TB      Score 19  -TB      Functional Assessment    Outcome Measure Options AM-PAC 6 Clicks Daily Activity (OT)  -TB AM-PAC 6 Clicks Basic Mobility (PT)  -       User Key  (r) = Recorded By, (t) = Taken By, (c) = Cosigned By    Initials Name Provider Type     Latonya Ramírez, OT Occupational Therapist    EDD Bryant PT Physical Therapist           Time Calculation:         PT Charges       07/18/17 0909          Time Calculation    Start Time 0807  -      PT Received On 07/18/17  -      PT Goal Re-Cert Due Date 07/28/17  -        User Key  (r) = Recorded By, (t) = Taken By, (c) = Cosigned By    Initials Name Provider Type     Jenny Bryant PT Physical Therapist          Therapy Charges for Today     Code Description Service Date Service Provider Modifiers Qty    [id number] HC PT EVAL MOD COMPLEXITY 4 7/18/2017 Jenny Bryant PT GP 1          PT G-Codes  Outcome Measure Options: AM-PAC 6 Clicks Daily Activity (OT)      Jenny Bryant PT  7/18/2017

## 2017-07-18 NOTE — PLAN OF CARE
Problem: Skin Integrity Impairment, Risk/Actual (Adult)  Goal: Identify Related Risk Factors and Signs and Symptoms  Outcome: Ongoing (interventions implemented as appropriate)    07/18/17 1617   Skin Integrity Impairment, Risk/Actual   Skin Integrity Impairment, Risk/Actual: Related Risk Factors age extremes;cognitive impairment   Signs and Symptoms (Skin Integrity Impairment) bulla/blister/vesicle       Goal: Skin Integrity/Wound Healing  Outcome: Ongoing (interventions implemented as appropriate)    07/18/17 1617   Skin Integrity Impairment, Risk/Actual (Adult)   Skin Integrity/Wound Healing making progress toward outcome         Problem: Stroke (Hemorrhagic) (Adult)  Goal: Signs and Symptoms of Listed Potential Problems Will be Absent or Manageable (Stroke)  Outcome: Ongoing (interventions implemented as appropriate)    07/18/17 1617   Stroke (Hemorrhagic)   Problems Assessed (Stroke (Hemorrhagic)) all   Problems Present (Stroke (Hemorrhagic)) none         Problem: Patient Care Overview (Adult)  Goal: Plan of Care Review  Outcome: Ongoing (interventions implemented as appropriate)    07/18/17 1617   Coping/Psychosocial Response Interventions   Plan Of Care Reviewed With patient   Patient Care Overview   Progress no change       Goal: Adult Individualization and Mutuality  Outcome: Ongoing (interventions implemented as appropriate)  Goal: Discharge Needs Assessment  Outcome: Ongoing (interventions implemented as appropriate)    07/18/17 1617   Discharge Needs Assessment   Concerns To Be Addressed no discharge needs identified   Readmission Within The Last 30 Days no previous admission in last 30 days   Equipment Needed After Discharge none   Discharge Disposition another healthcare facility, not defined   Current Health   Anticipated Changes Related to Illness none   Living Environment   Transportation Available none         Problem: Fall Risk (Adult)  Goal: Identify Related Risk Factors and Signs and  Symptoms  Outcome: Ongoing (interventions implemented as appropriate)  Goal: Absence of Falls  Outcome: Outcome(s) achieved Date Met:  07/18/17

## 2017-07-18 NOTE — THERAPY EVALUATION
Acute Care - Occupational Therapy Initial Evaluation  [facility]     Patient Name: Omayra Bhardwaj  : 1935  MRN: [id number]  Today's Date: 2017  Onset of Illness/Injury or Date of Surgery Date: 07/15/17  Date of Referral to OT: 17  Referring Physician: Jonathan    Admit Date: 7/15/2017       ICD-10-CM ICD-9-CM   1. Subarachnoid hemorrhage, traumatic, without loss of consciousness, initial encounter S06.6X0A 852.01   2. Closed fracture of nasal bone, initial encounter S02.2XXA 802.0   3. Maxillary sinus fracture, closed, initial encounter S02.401A 802.4   4. Chest wall contusion, left, initial encounter S20.212A 922.1   5. Impaired mobility and ADLs Z74.09 799.89   6. Impaired functional mobility, balance, gait, and endurance Z74.09 V49.89     Patient Active Problem List   Diagnosis   • Hypertensive cardiovascular disease   • Dyslipidemia   • Chronic hypertension   • History of obesity   • Depression   • Chronic lymphedema   • Thyroid nodule   • Hyperparathyroidism   • Disordered sleep   • GERD (gastroesophageal reflux disease)   • Traumatic SAH (subarachnoid hemorrhage)   • Subarachnoid hemorrhage, traumatic   • UTI (urinary tract infection)     Past Medical History:   Diagnosis Date   • Chronic hypertension     probably essential.     • Chronic lymphedema     with probable chronic lower extremity venous insufficiency with recent acceptable bilateral lower extremity venous duplex study, 2013.   • Depression    • Disordered sleep     with prominent snoring and occasional nocturnal hypersomnolence.    • Dyslipidemia      untreated.   • Former tobacco use    • GERD (gastroesophageal reflux disease)    • History of obesity     (BMI 29.5).   • Hypercalcemia    • Hyperparathyroidism     with contemplated parathyroidectomy (Dr. Davis, [facility]).   • Hypertensive cardiovascular disease     probable   • Thyroid nodule     workup pending.     Past Surgical History:    Procedure Laterality Date   • APPENDECTOMY  1945   • BREAST BIOPSY Left    • BREAST EXCISIONAL BIOPSY Left    • CATARACT EXTRACTION  2010    bilateral    • OTHER SURGICAL HISTORY  1986    Laparoscopy   • PARATHYROIDECTOMY     • SQUAMOUS CELL CARCINOMA EXCISION  2010   • TONSILLECTOMY  1942          OT ASSESSMENT FLOWSHEET (last 72 hours)      OT Evaluation       07/18/17 0807 07/18/17 0806 07/18/17 0800 07/17/17 2156 07/17/17 1137    Rehab Evaluation    Document Type evaluation  -TB evaluation  -SJ       Subjective Information agree to therapy;complains of;weakness;pain  -TB agree to therapy;complains of;pain  -SJ       Patient Effort, Rehab Treatment good  -TB excellent  -SJ       Symptoms Noted During/After Treatment fatigue  -TB none  -SJ       Symptoms Noted Comment Pt reports decline in activity tolerance  -TB        General Information    Patient Profile Review yes  -TB yes  -SJ       Onset of Illness/Injury or Date of Surgery Date 07/15/17  -TB 07/15/17  -SJ       Referring Physician Castillo  -TB Castillo  -SJ       General Observations Pt rec'vd UIC, room air, IV intact  -TB Pleasant elderly female seated in recliner in room, significant facial bruising noted; IV, tele  -SJ       Pertinent History Of Current Problem Pt to ED s/p fall at home following a syncopal episode. CT (+) temporal lobe SAH. Imaging (+) facial fracture. UTI  -TB 81 y.o.F passed out while on toilet at home and sustained facial/head trauma, small subarachnoid hemmorhage and nasal bone fx  -SJ       Precautions/Limitations fall precautions  -TB fall precautions  -SJ       Prior Level of Function independent:;all household mobility;community mobility;transfer;bed mobility;ADL's;home management;driving;shopping;using stairs;work;dependent:   laundry/basement  -TB independent:;all household mobility;community mobility;gait;transfer;bed mobility;ADL's;driving   pt very active, does [employer], states she could walk down str  -SJ        Equipment Currently Used at Home grab bar;rollator  -TB rollator;grab bar  -SJ   rollator;grab bar  -DK    Plans/Goals Discussed With patient;agreed upon  -TB patient;agreed upon  -SJ       Risks Reviewed patient:;LOB;increased discomfort;lines disloged  -TB patient:;LOB;dizziness;increased discomfort;change in vital signs;lines disloged  -SJ       Benefits Reviewed patient:;improve function;increase independence;decrease pain;increase knowledge  -TB patient:;improve function;increase independence;increase strength;increase balance;increase knowledge  -SJ       Barriers to Rehab previous functional deficit  -TB none identified  -SJ       Living Environment    Lives With alone  -TB alone  -SJ   alone  -DK    Living Arrangements house  -TB house  -SJ   house  -DK    Home Accessibility bed and bath on same level;tub/shower is not walk in;stairs to enter home;stairs within home  -TB stairs to enter home;bed and bath on same level;stairs within home  -SJ   bed and bath on same level  -DK    Number of Stairs to Enter Home  2  -SJ       Stair Railings at Home  none  -SJ       Transportation Available     car;family or friend will provide  -DK    Living Environment Comment Pt lives in 2-story on basement; lives on main level; dtr does laundry in basement  -TB Pt does not use stairs in home  -SJ   --   Pt is independent with ADL's and still drives.  -DK    Clinical Impression    Date of Referral to OT 07/17/17  -TB        OT Diagnosis Impaired mobility, transfer and ADL  -TB        Patient/Family Goals Statement [facility]  -TB        Criteria for Skilled Therapeutic Interventions Met yes;treatment indicated  -TB        Rehab Potential good, to achieve stated therapy goals  -TB        Therapy Frequency daily  -TB        Anticipated Equipment Needs At Discharge bedside commode;tub bench  -TB        Anticipated Discharge Disposition skilled nursing facility  -TB        Functional Level Prior    Prior Functional  Level Comment Independent prior; pt does report unable to tolerate walking long distances  -TB        Vital Signs    Pre Systolic BP Rehab  145  -SJ       Pre Treatment Diastolic BP  80  -SJ       Intra Systolic BP Rehab 146  -TB        Intra Treatment Diastolic BP 71  -TB        Post Systolic BP Rehab  146  -SJ       Post Treatment Diastolic BP  71  -SJ       Pretreatment Heart Rate (beats/min)  71  -SJ       Posttreatment Heart Rate (beats/min)  69  -SJ       O2 Delivery Post Treatment room air  -TB        Pre Patient Position Sitting  -TB Sitting  -SJ       Intra Patient Position Standing  -TB Standing  -SJ       Post Patient Position Sitting  -TB Sitting  -SJ       Pain Assessment    Pain Assessment 0-10  -TB 0-10  -SJ       Pain Score 7  -TB 7  -SJ       Post Pain Score 8  -TB 7  -SJ       Pain Type Acute pain  -TB Acute pain  -SJ       Pain Location Rib cage  -TB Rib cage  -SJ       Pain Intervention(s) Ambulation/increased activity;Repositioned  -TB Medication (See MAR);Repositioned;Ambulation/increased activity  -SJ       Response to Interventions Pt tolerated activity  -TB        Vision Assessment/Intervention    Visual Impairment WFL with corrective lenses  -TB        Cognitive Assessment/Intervention    Current Cognitive/Communication Assessment functional  -TB functional  -SJ       Orientation Status oriented x 4  -TB oriented x 4  -SJ       Follows Commands/Answers Questions able to follow single-step instructions;100% of the time  -% of the time;able to follow multi-step instructions  -SJ       Personal Safety WNL/WFL  -TB mild impairment;decreased awareness, need for safety  -       Personal Safety Interventions fall prevention program maintained;gait belt;nonskid shoes/slippers when out of bed  -TB gait belt;muscle strengthening facilitated;nonskid shoes/slippers when out of bed  -SJ       Short/Long Term Memory intact short term memory;intact long term memory  -TB        ROM (Range of  Motion)    General ROM upper extremity range of motion deficits identified  -TB no range of motion deficits identified   BLE's  -SJ       General ROM Detail R UE WFL; L UE Shoulder deficit - pt going to outpt PT to address prior to admission  -TB        General UE Assessment    ROM shoulder, left: UE ROM deficit  -TB        ROM Detail L elbow/wrist/hand WFL  -TB        MMT (Manual Muscle Testing)    General MMT Assessment upper extremity strength deficits identified  -TB lower extremity strength deficits identified  -SJ       General MMT Assessment Detail R UE WFL for age/ADL/AD use 4/5; L Shoulder deferred; L elbow/wrist/hand 3+/5  -TB bilat hip flex 4+/5, hip abd/add 4/5, knee flex/ext 4+/5  -SJ       Muscle Tone Assessment    Muscle Tone Assessment   Bilateral Upper Extremities;Bilateral Lower Extremities  -LG Bilateral Upper Extremities;Bilateral Lower Extremities  -AB     Bilateral Upper Extremities Muscle Tone Assessment   associated movements noted  -LG associated movements noted  -AB     Bilateral Lower Extremities Muscle Tone Assessment   associated movements noted  -LG associated movements noted  -AB     Bed Mobility, Assessment/Treatment    Bed Mob, Supine to Sit, [place] not tested  -TB        Bed Mob, Sit to Supine, [place] not tested  -TB        Bed Mobility, Comment Pt rec'vd UIC  -TB UIC  -SJ       Transfer Assessment/Treatment    Transfers, Sit-Stand [place] contact guard assist;verbal cues required  -TB contact guard assist;verbal cues required  -SJ       Transfers, Stand-Sit [place] contact guard assist;verbal cues required  -TB verbal cues required;contact guard assist  -SJ       Transfers, Sit-Stand-Sit, Assist Device rolling walker  -TB rolling walker  -SJ       Transfer, Safety Issues  weight-shifting ability decreased  -SJ       Transfer, Impairments strength decreased;pain  -TB strength decreased;impaired balance;pain  -SJ       Transfer, Comment cues for hand placement   - safety with lines  -       Functional Mobility    Functional Mobility- Ind. Level contact guard assist;verbal cues required  -        Functional Mobility- Device rolling walker  -        Functional Mobility-Distance (Feet) 150  -        Functional Mobility- Safety Issues step length decreased;balance decreased during turns  -        Functional Mobility- Comment standing rest break x1  -TB        Upper Body Dressing Assessment/Training    UB Dressing Assess/Train, Clothing Type donning:;hospital gown  -        UB Dressing Assess/Train, Assist Device lei technique  -TB        UB Dressing Assess/Train, Position sitting  -        UB Dressing Assess/Train, [place] moderate assist (50% patient effort);verbal cues required  -        UB Dressing Assess/Train, Impairments ROM decreased  -        UB Dressing Assess/Train, Comment assist for lines; cues for lei-dressing  -        Lower Body Dressing Assessment/Training    LB Dressing Assess/Train, Clothing Type doffing:;donning:;slipper socks  -        LB Dressing Assess/Train, Position sitting  -TB        LB Dressing Assess/Train, [place] supervision required  -        LB Dressing Assess/Train, Impairments ROM decreased;strength decreased;pain  -TB        Motor Skills/Interventions    Additional Documentation  Balance Skills Training (Group)  -       Balance Skills Training    Sitting-Level of Assistance Distant supervision  - Close supervision  -       Sitting-Balance Support Feet supported  - Right upper extremity supported;Left upper extremity supported  -       Sitting-Balance Activities Lateral lean;Forward lean;Reaching for objects;Reaching across midline   ADL tasks  -        Standing-Level of Assistance Contact guard  - Contact guard  -       Static Standing Balance Support assistive device  - assistive device  -       Standing-Balance Activities Weight Shift R-L  -        Standing Balance # of Minutes 3   -TB        Gait Balance-Level of Assistance  Contact guard  -SJ       Gait Balance Support  assistive device  -SJ       Therapy Exercises    Right Upper Extremity AROM:;sitting;shoulder extension/flexion;shoulder abduction/adduction;shoulder horizontal abd/add;shoulder ER/IR;elbow flexion/extension;pronation/supination;hand pumps  -TB        Left Upper Extremity AAROM:;shoulder extension/flexion;shoulder abduction/adduction;AROM:;elbow flexion/extension;pronation/supination;hand pumps   SROM  -TB        Fine Motor Coordination Training    Opposition Right:;Left:;intact  -TB        Sensory Assessment/Intervention    Light Touch LUE;RUE  -TB        LUE Light Touch WNL  -TB        RUE Light Touch WNL  -TB        Positioning and Restraints    Pre-Treatment Position sitting in chair/recliner  -TB sitting in chair/recliner  -SJ       Post Treatment Position chair  -TB chair  -SJ       In Chair notified nsg;sitting;call light within reach;encouraged to call for assist   Pt set up with breakfast meal  -TB notified nsg;sitting;call light within reach;encouraged to call for assist  -SJ         07/17/17 1134 07/15/17 2100             General Information    Equipment Currently Used at Home  none  -RS       Living Environment    Lives With  alone  -RS       Living Arrangements  house  -RS       Home Accessibility  no concerns  -RS       Stair Railings at Home  none  -RS       Type of Financial/Environmental Concern  none  -RS       Transportation Available  car  -RS       Functional Level Prior    Ambulation 0-->independent  -DK 0-->independent  -RS       Transferring 0-->independent  -DK 0-->independent  -RS       Toileting 0-->independent  -DK 0-->independent  -RS       Bathing 0-->independent  -DK 0-->independent  -RS       Dressing 0-->independent  -DK 0-->independent  -RS       Eating 0-->independent  -DK 0-->independent  -RS       Communication 0-->understands/communicates without difficulty  -DK  0-->understands/communicates without difficulty  -RS       Swallowing 0-->swallows foods/liquids without difficulty  -DK 0-->swallows foods/liquids without difficulty  -RS       Prior Functional Level Comment --   independent.  -DK --   n/a  -RS         User Key  (r) = Recorded By, (t) = Taken By, (c) = Cosigned By    Initials Name Effective Dates     Latonya Ramírez OT 06/22/15 -     SJ Jenny Bryant, PT 06/19/15 -     RS Uriel León, RN 06/16/16 -     DK Brook Parekh, RN 03/14/16 -     AB Ameena Cummings, JEVON 06/16/16 -     LG James Stinson, JEVON 03/01/17 -            Occupational Therapy Education     Title: PT OT SLP Therapies (Active)     Topic: Occupational Therapy (Done)     Point: ADL training (Done)    Description: Instruct learner(s) on proper safety adaptation and remediation techniques during self care or transfers.   Instruct in proper use of assistive devices.    Learning Progress Summary    Learner Readiness Method Response Comment Documented by Status   Patient Acceptance E,D VU,NR Education initiated for benefits of therapy, role of OT, and recommendation for SNF level rehab at d/c. Education reinforced for HEP L shoulder.  07/18/17 0904 Done               Point: Home exercise program (Done)    Description: Instruct learner(s) on appropriate technique for monitoring, assisting and/or progressing therapeutic exercises/activities.    Learning Progress Summary    Learner Readiness Method Response Comment Documented by Status   Patient Acceptance E,D VU,NR Education initiated for benefits of therapy, role of OT, and recommendation for SNF level rehab at d/c. Education reinforced for HEP L shoulder. TB 07/18/17 0904 Done                      User Key     Initials Effective Dates Name Provider Type Discipline     06/22/15 -  Latonya Ramírez OT Occupational Therapist OT                  OT Recommendation and Plan  Anticipated Equipment Needs At Discharge: bedside commode, tub  bench  Anticipated Discharge Disposition: skilled nursing facility  Therapy Frequency: daily  Plan of Care Review  Plan Of Care Reviewed With: patient  Outcome Summary/Follow up Plan: OT IE completed. Pt CGA for transfer and fx'l mobility. Recommend rehab at d/c to support return to PLOF.  Pt/family considering move to [place], requesting CM assist.          OT Goals       07/18/17 0908          Bed Mobility OT LTG    Bed Mobility OT LTG, Time to Achieve by discharge  -TB      Bed Mobility OT LTG, Activity Type all bed mobility  -TB      Bed Mobility OT LTG, [place] Level minimum assist (75% patient effort);verbal cues required  -TB      Transfer Training OT LTG    Transfer Training OT LTG, Time to Achieve by discharge  -TB      Transfer Training OT LTG, Activity Type toilet;tub  -TB      Transfer Training OT LTG, [place] Level contact guard assist;verbal cues required  -TB      Transfer Training OT LTG, Assist Device commode, bedside;walker, rolling  -TB      Transfer Training OT LTG, Additional Goal educate for TTB  -TB      Patient Education OT LTG    Patient Education OT LTG, Time to Achieve by discharge  -TB      Patient Education OT LTG, Education Type written program;HEP;home safety;aware of neuro deficits  -TB      Patient Education OT LTG, Education Understanding demonstrates adequately;verbalizes understanding  -TB      Patient Education OT LTG, Additional Goal Pt completing SROM/walk-walks L UE - per outpt PT HEP  -TB      UB Dressing OT LTG    UB Dressing Goal OT LTG, Time to Achieve by discharge  -TB      UB Dressing Goal OT LTG, [place] Level minimum assist (75% patient effort)  -TB      UB Dressing Goal OT LTG, Additional Goal via lei-technique  -TB        User Key  (r) = Recorded By, (t) = Taken By, (c) = Cosigned By    Initials Name Provider Type    REHAN Ramírez OT Occupational Therapist                Outcome Measures       07/18/17 0807 07/18/17 0806       How much help  from another person do you currently need...    Turning from your back to your side while in flat bed without using bedrails?  3  -SJ     Moving from lying on back to sitting on the side of a flat bed without bedrails?  3  -SJ     Moving to and from a bed to a chair (including a wheelchair)?  3  -SJ     Standing up from a chair using your arms (e.g., wheelchair, bedside chair)?  3  -SJ     Climbing 3-5 steps with a railing?  3  -SJ     To walk in hospital room?  3  -SJ     AM-PAC 6 Clicks Score  18  -SJ     How much help from another is currently needed...    Putting on and taking off regular lower body clothing? 3  -TB      Bathing (including washing, rinsing, and drying) 3  -TB      Toileting (which includes using toilet bed pan or urinal) 3  -TB      Putting on and taking off regular upper body clothing 3  -TB      Taking care of personal grooming (such as brushing teeth) 3  -TB      Eating meals 4  -TB      Score 19  -TB      Functional Assessment    Outcome Measure Options AM-PAC 6 Clicks Daily Activity (OT)  -TB AM-PAC 6 Clicks Basic Mobility (PT)  -SJ       User Key  (r) = Recorded By, (t) = Taken By, (c) = Cosigned By    Initials Name Provider Type    TB Latonya Ramírez OT Occupational Therapist    SJ Jenny Bryant PT Physical Therapist          Time Calculation:   OT Start Time: 0807    Therapy Charges for Today     Code Description Service Date Service Provider Modifiers Qty    99873406057  OT EVAL MOD COMPLEXITY 4 7/18/2017 Latonya Ramírez OT GO 1               Latonya Ramírez OT  7/18/2017

## 2017-07-18 NOTE — PROGRESS NOTES
Continued Stay Note  [facility]     Patient Name: Omayra Bhardwaj  MRN: [id number]  Today's Date: 7/18/2017    Admit Date: 7/15/2017          Discharge Plan       07/18/17 1531    Case Management/Social Work Plan    Plan SNF    Patient/Family In Agreement With Plan yes    Additional Comments PT recommendation is for inpatient rehab.  Met with Ms. Bhardwaj and her daughter, Prabhu, at the bedside to discuss facilities.  They requested referrals to [facility] and [facility].  Referrals given to those facilities.  A prior authorization will be required from Ms. Ng's [employer] for inpatient rehab.  CM will follow up.              Discharge Codes     None        Expected Discharge Date and Time     Expected Discharge Date Expected Discharge Time    Jul 19, 2017             Amanda Rahman

## 2017-07-19 PROCEDURE — 97116 GAIT TRAINING THERAPY: CPT

## 2017-07-19 PROCEDURE — 99232 SBSQ HOSP IP/OBS MODERATE 35: CPT | Performed by: NURSE PRACTITIONER

## 2017-07-19 PROCEDURE — 97110 THERAPEUTIC EXERCISES: CPT

## 2017-07-19 RX ADMIN — Medication 2 TABLET: at 09:27

## 2017-07-19 RX ADMIN — FAMOTIDINE 20 MG: 20 TABLET ORAL at 09:27

## 2017-07-19 RX ADMIN — ACETAMINOPHEN 650 MG: 325 TABLET, FILM COATED ORAL at 16:30

## 2017-07-19 RX ADMIN — ATORVASTATIN CALCIUM 10 MG: 10 TABLET, FILM COATED ORAL at 21:06

## 2017-07-19 RX ADMIN — Medication 2 TABLET: at 17:21

## 2017-07-19 RX ADMIN — LISINOPRIL 5 MG: 5 TABLET ORAL at 09:27

## 2017-07-19 NOTE — PROGRESS NOTES
"Hospitalist Daily Progress Note    Date of Admission: 7/15/2017   LOS: 4 days   PCP: Kitty Romero MD    Chief Complaint: s/p fall and SAH    Subjective      Resting comfortably in bed with visitor at bedside.  States that she had a headache this morning that is better now.  Still having some facial discomfort as well as pleuritic pain.  States that she isn't sure if they brought her any tylenol and thinks it has been at least 24 hours since she had any.  She wasn't aware that she needed to ask.     Fall         Review of Systems  General: no fevers, chills  Respiratory: no cough, dyspnea, +pleuritic pain  Cardiovascular: no chest pain, palpitations  Abdomen: No abdominal pain, nausea, vomiting, or diarrhea  Neurologic: No focal weakness    Objective   Physical Exam:  I have reviewed the vital signs.  Temp:  [97.1 °F (36.2 °C)-98.7 °F (37.1 °C)] 98.1 °F (36.7 °C)  Heart Rate:  [63-67] 67  Resp:  [16-18] 16  BP: (124-156)/(58-92) 145/76    Objective:  Vital signs: (most recent): Blood pressure 145/76, pulse 67, temperature 98.1 °F (36.7 °C), temperature source Oral, resp. rate 16, height 65\" (165.1 cm), weight 175 lb 8 oz (79.6 kg), SpO2 96 %.            General Appearance:    Alert, cooperative, no distress, resting in bed  Head:    Normocephalic, large ecchymosis around orbits bilaterally extending down both cheeks  Eyes:    Sclerae anicteric, No EOM  Neck:   Supple, no mass  Lungs:   Clear to auscultation bilaterally, respirations unlabored on room air  Heart:   Regular rate and rhythm, S1 and S2 normal, no murmur, rub or gallop  Abdomen:  Soft, non-tender, bowel sounds active, nondistended  Extremities: No clubbing, cyanosis, or edema to lower extremities  Skin: No rashes   Neurologic: Oriented x3, Normal strength to extremities    Results Review:    I have reviewed the labs, radiology results and diagnostic studies.      Results from last 7 days  Lab Units 07/18/17  0538   WBC 10*3/mm3 5.04   HEMOGLOBIN " g/dL 11.5   PLATELETS 10*3/mm3 155       Results from last 7 days  Lab Units 07/18/17  0538   SODIUM mmol/L 138   POTASSIUM mmol/L 4.2   CO2 mmol/L 29.0   CREATININE mg/dL 0.80   GLUCOSE mg/dL 104*       I have reviewed the medications.    ---------------------------------------------------------------------------------------------  Assessment/Plan   Assessment & Plan  Assessment/Problem List    Principal Problem:     Traumatic SAH (subarachnoid hemorrhage).  Currently asymptomatic.      Hypertensive cardiovascular disease      Dyslipidemia      Chronic hypertension      Depression      Chronic lymphedema      Thyroid nodule      Hyperparathyroidism      Disordered sleep      GERD (gastroesophageal reflux disease)       UTI (urinary tract infection)      81-year-old female with hypertension who had a syncopal event after using the bathroom, resulting in a fall with a small subarachnoid hemorrhage and facial fractures.  Labs showed some evidence of a urinary tract infection.  Given the subarachnoid hemorrhage as well as some intermittent drowsiness, she was monitored in the intensive care unit setting.  Dr. Gutierrez with neurosurgery was contacted by the emergency department, saw the patient in consultation and recommended no surgical treatment.  She was transferred out to the hospital floor  On 7/28/17 where hospital medicine assumed care.         Plan    -Discontinue rocephin today- she has completed 5 days of therapy for her UTI  -Urine culture fairly benign  -cont current care  -likely to rehab- awaiting word from the [place] as well as [facility].              DVT prophylaxis:  TEDS, SCDs  Discharge Planning: I expect patient to be discharged to rehab once bed available.  Currently medically ready for discharge.      Merly Stuart, APRN 07/19/17 1:53 PM

## 2017-07-19 NOTE — THERAPY TREATMENT NOTE
Acute Care - Physical Therapy Treatment Note  [facility]     Patient Name: Omayra Bhardwaj  : 1935  MRN: [id number]  Today's Date: 2017  Onset of Illness/Injury or Date of Surgery Date: 07/15/17  Date of Referral to PT: 17  Referring Physician: Jonathan    Admit Date: 7/15/2017    Visit Dx:    ICD-10-CM ICD-9-CM   1. Subarachnoid hemorrhage, traumatic, without loss of consciousness, initial encounter S06.6X0A 852.01   2. Closed fracture of nasal bone, initial encounter S02.2XXA 802.0   3. Maxillary sinus fracture, closed, initial encounter S02.401A 802.4   4. Chest wall contusion, left, initial encounter S20.212A 922.1   5. Impaired mobility and ADLs Z74.09 799.89   6. Impaired functional mobility, balance, gait, and endurance Z74.09 V49.89     Patient Active Problem List   Diagnosis   • Hypertensive cardiovascular disease   • Dyslipidemia   • Chronic hypertension   • History of obesity   • Depression   • Chronic lymphedema   • Thyroid nodule   • Hyperparathyroidism   • Disordered sleep   • GERD (gastroesophageal reflux disease)   • Traumatic SAH (subarachnoid hemorrhage)   • Subarachnoid hemorrhage, traumatic   • UTI (urinary tract infection)               Adult Rehabilitation Note       17 1122          Rehab Assessment/Intervention    Discipline physical therapist  -LR      Document Type therapy note (daily note)  -LR      Subjective Information agree to therapy;no complaints   reports she wants to walk further today  -LR      Patient Effort, Rehab Treatment excellent  -LR      Symptoms Noted During/After Treatment fatigue  -LR      Precautions/Limitations fall precautions  -LR      Specific Treatment Considerations c/o rib pain on L when coughing/sneezing  -LR      Recorded by [LR] Sonali Tavares, PT      Pain Assessment    Pain Assessment 0-10  -LR      Pain Score 0  -LR      Post Pain Score 0  -LR      Recorded by [LR] Sonali Tavares, PT      Cognitive  Assessment/Intervention    Current Cognitive/Communication Assessment functional  -LR      Orientation Status oriented x 4;required verbal cueing (specifiy in comments)  -LR      Follows Commands/Answers Questions 100% of the time;able to follow single-step instructions;needs cueing;needs repetition  -LR      Personal Safety WNL/WFL  -LR      Recorded by [LR] Sonali Tavares, PT      Bed Mobility, Assessment/Treatment    Bed Mob, Supine to Sit, [place] not tested   UIC on arrival.   -LR      Bed Mob, Sit to Supine, [place] not tested   UIC at end of treatment.   -LR      Recorded by [LR] Sonali Tavares, PT      Transfer Assessment/Treatment    Transfers, Sit-Stand [place] verbal cues required;contact guard assist  -LR      Transfers, Stand-Sit [place] verbal cues required;contact guard assist  -LR      Transfers, Sit-Stand-Sit, Assist Device rolling walker  -LR      Toilet Transfer, [place] verbal cues required;contact guard assist  -LR      Toilet Transfer, Assistive Device rolling walker  -LR      Transfer, Safety Issues sequencing ability decreased;step length decreased;weight-shifting ability decreased  -LR      Transfer, Impairments impaired balance;pain;strength decreased  -LR      Transfer, Comment Verbal cues to push up from chair to stand and to reach back for chair to sit instead of using RW for UE support. Assisted patient to and from bathroom. Cues for safety and correct hand placement with toilet t/f.   -LR      Recorded by [LR] Sonali Tavares, PT      Gait Assessment/Treatment    Gait, [place] Level verbal cues required;contact guard assist  -LR      Gait, Assistive Device rolling walker  -LR      Gait, Distance (Feet) 400  -LR      Gait, Gait Pattern Analysis swing-through gait  -LR      Gait, Gait Deviations bilateral:;step length decreased;toe-to-floor clearance decreased;weight-shifting ability decreased  -LR      Gait, Safety Issues step length  decreased;weight-shifting ability decreased  -LR      Gait, Impairments strength decreased;impaired balance;pain  -LR      Gait, Comment Patient ambulated with step through gait pattern at slow pace with short step length. Mild improvement with cues for correction. Cues to stay inside of RW and keep it close. Gait limited by fatigue. Took one brief standing rest break.   -LR      Recorded by [LR] Sonali Tavares, PT      Therapy Exercises    Bilateral Lower Extremities AROM:;15 reps;sitting;ankle pumps/circles;glut sets;heel slides;hip abduction/adduction;hip flexion;LAQ;quad sets;SLR;other reps   pillow squeezes; cues for technique  -LR      Recorded by [LR] Sonali Tavares, PT      Positioning and Restraints    Pre-Treatment Position sitting in chair/recliner  -LR      Post Treatment Position chair  -LR      In Chair notified nsg;sitting;call light within reach;encouraged to call for assist  -LR      Recorded by [LR] Sonali Tavares, PT        User Key  (r) = Recorded By, (t) = Taken By, (c) = Cosigned By    Initials Name Effective Dates    LR Sonali Tavares, PT 06/19/15 -                 IP PT Goals       07/19/17 1122 07/18/17 0903       Gait Training PT LTG    Gait Training Goal PT LTG, Date Established 07/18/17  -LR 07/18/17  -SJ     Gait Training Goal PT LTG, Time to Achieve 2 wks  -LR 2 wks  -SJ     Gait Training Goal PT LTG, [place] Level conditional independence  -LR conditional independence  -SJ     Gait Training Goal PT LTG, Assist Device cane, straight  -LR cane, straight  -SJ     Gait Training Goal PT LTG, Distance to Achieve 800 feet  -  -SJ     Gait Training Goal PT LTG, Date Goal Reviewed 07/19/17  -LR      Gait Training Goal PT LTG, Outcome goal ongoing  -LR goal ongoing  -SJ     Dynamic Standing Balance PT LTG    Dynamic Standing Balance PT LTG, Date Established 07/18/17  -LR 07/18/17  -SJ     Dynamic Standing Balance PT LTG, Time to Achieve 2 wks  -LR 2 wks   -SJ     Dynamic Standing Balance PT LTG, [place] Level conditional independence  -LR conditional independence  -SJ     Dynamic Standing Balance PT LTG, Date Goal Reviewed 07/19/17  -LR      Dynamic Standing Balance PT LTG, Outcome goal ongoing  -LR goal ongoing  -SJ       User Key  (r) = Recorded By, (t) = Taken By, (c) = Cosigned By    Initials Name Provider Type    SJ Jenny Bryant, PT Physical Therapist    LR Sonali Tavares, PT Physical Therapist          Physical Therapy Education     Title: PT OT SLP Therapies (Done)     Topic: Physical Therapy (Done)     Point: Mobility training (Done)    Learning Progress Summary    Learner Readiness Method Response Comment Documented by Status   Patient Acceptance E,D VU,NR Educated on benefits of frequent ambulation, need for RW for safety at this time. Educated on use of pillow for splinting when coughing/sneezing. LR 07/19/17 1157 Done    Acceptance E,D NR   07/18/17 0907 Active               Point: Home exercise program (Done)    Learning Progress Summary    Learner Readiness Method Response Comment Documented by Status   Patient Acceptance ED VU,NR Educated on benefits of frequent ambulation, need for RW for safety at this time. Educated on use of pillow for splinting when coughing/sneezing. LR 07/19/17 1157 Done    Acceptance E,D NR   07/18/17 0907 Active               Point: Body mechanics (Done)    Learning Progress Summary    Learner Readiness Method Response Comment Documented by Status   Patient Acceptance E,D VU,NR Educated on benefits of frequent ambulation, need for RW for safety at this time. Educated on use of pillow for splinting when coughing/sneezing. LR 07/19/17 1157 Done    Acceptance E,D NR   07/18/17 0907 Active               Point: Precautions (Done)    Learning Progress Summary    Learner Readiness Method Response Comment Documented by Status   Patient Acceptance E,D VU,NR Educated on benefits of frequent ambulation, need for RW  for safety at this time. Educated on use of pillow for splinting when coughing/sneezing. LR 07/19/17 1157 Done    Acceptance E,D NR   07/18/17 0907 Active                      User Key     Initials Effective Dates Name Provider Type Discipline     06/19/15 -  Jenny Bryant, PT Physical Therapist PT     06/19/15 -  Sonali Tavares, PT Physical Therapist PT                    PT Recommendation and Plan  Anticipated Discharge Disposition: inpatient rehabilitation facility  Planned Therapy Interventions: balance training, gait training, home exercise program, patient/family education, strengthening, transfer training  PT Frequency: daily  Plan of Care Review  Plan Of Care Reviewed With: patient  Progress: progress toward functional goals as expected  Outcome Summary/Follow up Plan: Patient increased gait distance to 400 feet with RW, still mildly unsteady. Demonstrates good safety awareness. Will continue to progress mobility, balance training, and strengthening as able.           Outcome Measures       07/19/17 1122 07/18/17 0807 07/18/17 0806    How much help from another person do you currently need...    Turning from your back to your side while in flat bed without using bedrails? 3  -LR  3  -SJ    Moving from lying on back to sitting on the side of a flat bed without bedrails? 3  -LR  3  -SJ    Moving to and from a bed to a chair (including a wheelchair)? 3  -LR  3  -SJ    Standing up from a chair using your arms (e.g., wheelchair, bedside chair)? 3  -LR  3  -SJ    Climbing 3-5 steps with a railing? 3  -LR  3  -SJ    To walk in hospital room? 3  -LR  3  -SJ    AM-PAC 6 Clicks Score 18  -LR  18  -SJ    How much help from another is currently needed...    Putting on and taking off regular lower body clothing?  3  -TB     Bathing (including washing, rinsing, and drying)  3  -TB     Toileting (which includes using toilet bed pan or urinal)  3  -TB     Putting on and taking off regular upper body clothing   3  -TB     Taking care of personal grooming (such as brushing teeth)  3  -TB     Eating meals  4  -TB     Score  19  -TB     Functional Assessment    Outcome Measure Options AM-PAC 6 Clicks Basic Mobility (PT)  -LR AM-PAC 6 Clicks Daily Activity (OT)  -TB AM-PAC 6 Clicks Basic Mobility (PT)  -SJ      User Key  (r) = Recorded By, (t) = Taken By, (c) = Cosigned By    Initials Name Provider Type    TB Latonya Ramírez, OT Occupational Therapist    SJ Jenny Bryant, PT Physical Therapist    LR Sonali Tavares, PT Physical Therapist           Time Calculation:         PT Charges       07/19/17 1159          Time Calculation    Start Time 1122  -LR      PT Received On 07/19/17  -LR      PT Goal Re-Cert Due Date 07/28/17  -LR      Time Calculation- PT    Total Timed Code Minutes- PT 28 minute(s)  -LR        User Key  (r) = Recorded By, (t) = Taken By, (c) = Cosigned By    Initials Name Provider Type    LR Sonali Tavares, PT Physical Therapist          Therapy Charges for Today     Code Description Service Date Service Provider Modifiers Qty    93734304707 HC GAIT TRAINING EA 15 MIN 7/19/2017 Sonali Tavares, PT GP 1    10765660408 HC PT THER PROC EA 15 MIN 7/19/2017 Sonali Tavares, PT GP 1          PT G-Codes  Outcome Measure Options: AM-PAC 6 Clicks Basic Mobility (PT)    Sonali Tavares, PT  7/19/2017

## 2017-07-19 NOTE — PROGRESS NOTES
Continued Stay Note  [facility]     Patient Name: Omayra Bhardwaj  MRN: [id number]  Today's Date: 7/19/2017    Admit Date: 7/15/2017          Discharge Plan       07/19/17 1320    Case Management/Social Work Plan    Plan Update    Patient/Family In Agreement With Plan yes    Additional Comments Leonor with the [facility] is starting the pre-auth with pt's insurance as it appears pt is getting closer to d/c.  SW has left a message with Noa at [facility] as this was pt's first choice.  CM will continue to follow for pre-cert through pt's insurance and bed availability at [facility] and [facility].  [facility] will most likely not have any beds available until next week.              Discharge Codes     None        Expected Discharge Date and Time     Expected Discharge Date Expected Discharge Time    Jul 19, 2017             STEVE Jameson

## 2017-07-19 NOTE — PLAN OF CARE
Problem: Skin Integrity Impairment, Risk/Actual (Adult)  Goal: Identify Related Risk Factors and Signs and Symptoms  Outcome: Ongoing (interventions implemented as appropriate)    Problem: Stroke (Hemorrhagic) (Adult)  Goal: Signs and Symptoms of Listed Potential Problems Will be Absent or Manageable (Stroke)  Outcome: Ongoing (interventions implemented as appropriate)    Problem: Patient Care Overview (Adult)  Goal: Plan of Care Review  Outcome: Ongoing (interventions implemented as appropriate)    07/19/17 6629   Coping/Psychosocial Response Interventions   Plan Of Care Reviewed With patient   Patient Care Overview   Progress progress toward functional goals as expected         Problem: Fall Risk (Adult)  Goal: Identify Related Risk Factors and Signs and Symptoms  Outcome: Ongoing (interventions implemented as appropriate)    07/19/17 4068   Fall Risk   Fall Risk: Related Risk Factors gait/mobility problems   Fall Risk: Signs and Symptoms presence of risk factors

## 2017-07-19 NOTE — PLAN OF CARE
Problem: Patient Care Overview (Adult)  Goal: Plan of Care Review  Outcome: Ongoing (interventions implemented as appropriate)    07/19/17 1122   Coping/Psychosocial Response Interventions   Plan Of Care Reviewed With patient   Patient Care Overview   Progress progress toward functional goals as expected   Outcome Evaluation   Outcome Summary/Follow up Plan Patient increased gait distance to 400 feet with RW, still mildly unsteady. Demonstrates good safety awareness. Will continue to progress mobility, balance training, and strengthening as able.          Problem: Inpatient Physical Therapy  Goal: Gait Training Goal LTG- PT  Outcome: Ongoing (interventions implemented as appropriate)    07/19/17 1122   Gait Training PT LTG   Gait Training Goal PT LTG, Date Established 07/18/17   Gait Training Goal PT LTG, Time to Achieve 2 wks   Gait Training Goal PT LTG, [place] Level conditional independence   Gait Training Goal PT LTG, Assist Device cane, straight   Gait Training Goal PT LTG, Distance to Achieve 800 feet   Gait Training Goal PT LTG, Date Goal Reviewed 07/19/17   Gait Training Goal PT LTG, Outcome goal ongoing       Goal: Dynamic Standing Balance Goal LTG- PT  Outcome: Ongoing (interventions implemented as appropriate)    07/19/17 1122   Dynamic Standing Balance PT LTG   Dynamic Standing Balance PT LTG, Date Established 07/18/17   Dynamic Standing Balance PT LTG, Time to Achieve 2 wks   Dynamic Standing Balance PT LTG, [place] Level conditional independence   Dynamic Standing Balance PT LTG, Date Goal Reviewed 07/19/17   Dynamic Standing Balance PT LTG, Outcome goal ongoing

## 2017-07-19 NOTE — PROGRESS NOTES
Continued Stay Note  [facility]     Patient Name: Omayra Bhardwaj  MRN: [id number]  Today's Date: 7/19/2017    Admit Date: 7/15/2017          Discharge Plan   Consent obtained for the participation in the [facility] Transitions Program. Luz Harris RN        07/19/17 2484    Case Management/Social Work Plan    Plan Update    Patient/Family In Agreement With Plan yes    Additional Comments Leonor with the [facility] is starting the pre-auth with pt's insurance as it appears pt is getting closer to d/c.  SW has left a message with Noa at [facility] as this was pt's first choice.  CM will continue to follow for pre-cert through pt's insurance and bed availability at [facility] and [facility] at [facility].  [facility] will most likely not have any beds available until next week.              Discharge Codes     None        Expected Discharge Date and Time     Expected Discharge Date Expected Discharge Time    Jul 19, 2017             Luz Harris RN

## 2017-07-19 NOTE — PLAN OF CARE
Problem: Patient Care Overview (Adult)  Goal: Plan of Care Review  Outcome: Ongoing (interventions implemented as appropriate)    07/18/17 2106 07/19/17 0428   Coping/Psychosocial Response Interventions   Plan Of Care Reviewed With patient --    Patient Care Overview   Progress --  no change   Outcome Evaluation   Outcome Summary/Follow up Plan --  Patient rested well this shift. BP monitored. No significant changes.

## 2017-07-20 PROCEDURE — 97530 THERAPEUTIC ACTIVITIES: CPT | Performed by: OCCUPATIONAL THERAPIST

## 2017-07-20 PROCEDURE — 99231 SBSQ HOSP IP/OBS SF/LOW 25: CPT | Performed by: NURSE PRACTITIONER

## 2017-07-20 RX ADMIN — ATORVASTATIN CALCIUM 10 MG: 10 TABLET, FILM COATED ORAL at 20:24

## 2017-07-20 RX ADMIN — FAMOTIDINE 20 MG: 20 TABLET ORAL at 08:57

## 2017-07-20 RX ADMIN — Medication 2 TABLET: at 08:57

## 2017-07-20 RX ADMIN — LISINOPRIL 5 MG: 5 TABLET ORAL at 08:57

## 2017-07-20 NOTE — PLAN OF CARE
Problem: Pain, Acute (Adult)  Intervention: Monitor/Manage Analgesia    07/19/17 0800 07/19/17 1200   Safety Interventions   Medication Review/Management medications reviewed --    Manage Acute Burn Pain   Pain Management Interventions --  pain care plan reviewed with patient/caregiver

## 2017-07-20 NOTE — PROGRESS NOTES
"Hospitalist Daily Progress Note    Date of Admission: 7/15/2017   LOS: 5 days   PCP: Kitty Romero MD    Chief Complaint: s/p fall and SAH    Subjective      Sitting up in chair this morning with no new complaints.  No family at bedside.  Anxious to get to rehab as soon as possible.  Pain is improving.      Fall         Review of Systems  General: no fevers, chills  Respiratory: no cough, dyspnea, +pleuritic pain  Cardiovascular: no chest pain, palpitations  Abdomen: No abdominal pain, nausea, vomiting, or diarrhea  Neurologic: No focal weakness    Objective   Physical Exam:  I have reviewed the vital signs.  Temp:  [97.3 °F (36.3 °C)-98.2 °F (36.8 °C)] 97.3 °F (36.3 °C)  Heart Rate:  [65-74] 74  Resp:  [16-18] 18  BP: (106-133)/(53-76) 133/76    Objective:  Vital signs: (most recent): Blood pressure 133/76, pulse 74, temperature 97.3 °F (36.3 °C), temperature source Oral, resp. rate 18, height 65\" (165.1 cm), weight 175 lb 8 oz (79.6 kg), SpO2 96 %.            General Appearance:    Alert, cooperative, no distress, up in chair  Head:    Normocephalic, large ecchymosis around orbits bilaterally extending down both cheeks  Eyes:    Sclerae anicteric, No EOM  Neck:   Supple, no mass  Lungs:   Clear to auscultation bilaterally, respirations unlabored on room air  Heart:   Regular rate and rhythm, S1 and S2 normal, no murmur, rub or gallop  Abdomen:  Soft, non-tender, bowel sounds active, nondistended  Extremities: No clubbing, cyanosis, or edema to lower extremities  Skin: No rashes   Neurologic: Oriented x3, Normal strength to extremities    Results Review:    I have reviewed the labs, radiology results and diagnostic studies.      Results from last 7 days  Lab Units 07/18/17  0538   WBC 10*3/mm3 5.04   HEMOGLOBIN g/dL 11.5   PLATELETS 10*3/mm3 155       Results from last 7 days  Lab Units 07/18/17  0538   SODIUM mmol/L 138   POTASSIUM mmol/L 4.2   CO2 mmol/L 29.0   CREATININE mg/dL 0.80   GLUCOSE mg/dL 104* "       I have reviewed the medications.    ---------------------------------------------------------------------------------------------  Assessment/Plan   Assessment & Plan  Assessment/Problem List    Principal Problem:     Traumatic SAH (subarachnoid hemorrhage).  Currently asymptomatic.      Hypertensive cardiovascular disease      Dyslipidemia      Chronic hypertension      Depression      Chronic lymphedema      Thyroid nodule      Hyperparathyroidism      Disordered sleep      GERD (gastroesophageal reflux disease)       UTI (urinary tract infection)      81-year-old female with hypertension who had a syncopal event after using the bathroom, resulting in a fall with a small subarachnoid hemorrhage and facial fractures.  Labs showed some evidence of a urinary tract infection.  Given the subarachnoid hemorrhage as well as some intermittent drowsiness, she was monitored in the intensive care unit setting.  Dr. Gutierrez with neurosurgery was contacted by the emergency department, saw the patient in consultation and recommended no surgical treatment.  She was transferred out to the hospital floor  On 7/28/17 where hospital medicine assumed care.         Plan    -s/p 5 days of rocephin for UTI  -cont current care  -likely to rehab- awaiting word from the [facility] as well as LCP.              DVT prophylaxis:  Fady COVINGTON  Discharge Planning: I expect patient to be discharged to rehab once bed available.  Currently medically ready for discharge.      Merly Stuart, KT 07/20/17 8:20 AM

## 2017-07-20 NOTE — PLAN OF CARE
Problem: Stroke (Hemorrhagic) (Adult)  Goal: Signs and Symptoms of Listed Potential Problems Will be Absent or Manageable (Stroke)  Outcome: Ongoing (interventions implemented as appropriate)    07/20/17 1300   Stroke (Hemorrhagic)   Problems Assessed (Stroke (Hemorrhagic)) motor/sensory impairment   Problems Present (Stroke (Hemorrhagic)) motor/sensory impairment         Problem: Patient Care Overview (Adult)  Goal: Plan of Care Review  Outcome: Ongoing (interventions implemented as appropriate)    07/20/17 1300   Coping/Psychosocial Response Interventions   Plan Of Care Reviewed With patient   Outcome Evaluation   Outcome Summary/Follow up Plan pt demo's good balance with dynamic exercises in room along with in room mobility w/o use of rwx; educated pt on transfer technique to improve safety and indepence for carry over with over surface and at home/community environments         Problem: Inpatient Occupational Therapy  Goal: Bed Mobility Goal LTG- OT  Outcome: Ongoing (interventions implemented as appropriate)    07/18/17 0908 07/20/17 1300   Bed Mobility OT LTG   Bed Mobility OT LTG, Time to Achieve by discharge --    Bed Mobility OT LTG, Activity Type all bed mobility --    Bed Mobility OT LTG, [place] Level minimum assist (75% patient effort);verbal cues required --    Bed Mobility OT LTG, Outcome --  goal ongoing       Goal: Transfer Training Goal 1 LTG- OT  Outcome: Ongoing (interventions implemented as appropriate)    07/18/17 0908 07/20/17 1300   Transfer Training OT LTG   Transfer Training OT LTG, Time to Achieve by discharge --    Transfer Training OT LTG, Activity Type toilet;tub --    Transfer Training OT LTG, [place] Level contact guard assist;verbal cues required --    Transfer Training OT LTG, Assist Device commode, bedside;walker, rolling --    Transfer Training OT LTG, Additional Goal educate for TTB --    Transfer Training OT LTG, Outcome --  goal ongoing       Goal: Patient Education  Goal LTG- OT  Outcome: Ongoing (interventions implemented as appropriate)    07/18/17 0908 07/20/17 1300   Patient Education OT LTG   Patient Education OT LTG, Time to Achieve by discharge --    Patient Education OT LTG, Education Type written program;HEP;home safety;aware of neuro deficits --    Patient Education OT LTG, Education Understanding demonstrates adequately;verbalizes understanding --    Patient Education OT LTG, Additional Goal Pt completing SROM/walk-walks L UE - per outpt PT HEP --    Patient Education OT LTG Outcome --  goal partially met       Goal: UB Dressing Goal LTG- OT  Outcome: Ongoing (interventions implemented as appropriate)    07/18/17 0908 07/20/17 1300   UB Dressing OT LTG   UB Dressing Goal OT LTG, Time to Achieve by discharge --    UB Dressing Goal OT LTG, [place] Level minimum assist (75% patient effort) --    UB Dressing Goal OT LTG, Additional Goal via lei-technique --    UB Dressing Goal OT LTG, Outcome --  goal ongoing

## 2017-07-20 NOTE — THERAPY TREATMENT NOTE
Acute Care - Occupational Therapy Treatment Note  [facility]     Patient Name: Omayra Bhardwaj  : 1935  MRN: [id number]  Today's Date: 2017  Onset of Illness/Injury or Date of Surgery Date: 07/15/17  Date of Referral to OT: 17  Referring Physician: Jonathan      Admit Date: 7/15/2017    Visit Dx:     ICD-10-CM ICD-9-CM   1. Subarachnoid hemorrhage, traumatic, without loss of consciousness, initial encounter S06.6X0A 852.01   2. Closed fracture of nasal bone, initial encounter S02.2XXA 802.0   3. Maxillary sinus fracture, closed, initial encounter S02.401A 802.4   4. Chest wall contusion, left, initial encounter S20.212A 922.1   5. Impaired mobility and ADLs Z74.09 799.89   6. Impaired functional mobility, balance, gait, and endurance Z74.09 V49.89     Patient Active Problem List   Diagnosis   • Hypertensive cardiovascular disease   • Dyslipidemia   • Chronic hypertension   • History of obesity   • Depression   • Chronic lymphedema   • Thyroid nodule   • Hyperparathyroidism   • Disordered sleep   • GERD (gastroesophageal reflux disease)   • Traumatic SAH (subarachnoid hemorrhage)   • Subarachnoid hemorrhage, traumatic   • UTI (urinary tract infection)             Adult Rehabilitation Note       17 1300 17 1122       Rehab Assessment/Intervention    Discipline occupational therapist  -ST physical therapist  -LR     Document Type therapy note (daily note)  -ST therapy note (daily note)  -LR     Subjective Information no complaints;agree to therapy  -ST agree to therapy;no complaints   reports she wants to walk further today  -LR     Patient Effort, Rehab Treatment excellent  -ST excellent  -LR     Symptoms Noted During/After Treatment none  -ST fatigue  -LR     Precautions/Limitations fall precautions  -ST fall precautions  -LR     Specific Treatment Considerations c/o L rib pain  -ST c/o rib pain on L when coughing/sneezing  -LR     Recorded by [ST] Sujey Motnoya OTR [LR]  Sonali Tavares, PT     Pain Assessment    Pain Assessment 0-10  -ST 0-10  -LR     Pain Score 1  -ST 0  -LR     Post Pain Score 1  -ST 0  -LR     Pain Type Acute pain  -ST      Pain Location Rib cage  -ST      Pain Intervention(s) Repositioned;Ambulation/increased activity  -ST      Recorded by [ST] Sujey Montoya, OTR [LR] Sonali Tavares, PT     Cognitive Assessment/Intervention    Current Cognitive/Communication Assessment functional  -ST functional  -LR     Orientation Status oriented x 4  -ST oriented x 4;required verbal cueing (specifiy in comments)  -LR     Follows Commands/Answers Questions 100% of the time  -% of the time;able to follow single-step instructions;needs cueing;needs repetition  -LR     Personal Safety WNL/WFL  -ST WNL/WFL  -LR     Personal Safety Interventions fall prevention program maintained;gait belt;nonskid shoes/slippers when out of bed  -ST      Recorded by [ST] Sujey Montoya, OTR [LR] Sonali Tavares, PT     Bed Mobility, Assessment/Treatment    Bed Mob, Supine to Sit, [place]  not tested   [facility] on arrival.   -LR     Bed Mob, Sit to Supine, [place]  not tested   UIC at end of treatment.   -LR     Bed Mobility, Comment UIC  -ST      Recorded by [ST] Sujey Montoya, MARYR [LR] Sonali Tavares, PT     Transfer Assessment/Treatment    Transfers, Sit-Stand [place] stand by assist  -ST verbal cues required;contact guard assist  -LR     Transfers, Stand-Sit [place] stand by assist  -ST verbal cues required;contact guard assist  -LR     Transfers, Sit-Stand-Sit, Assist Device rolling walker  -ST rolling walker  -LR     Toilet Transfer, [place]  verbal cues required;contact guard assist  -LR     Toilet Transfer, Assistive Device  rolling walker  -LR     Transfer, Safety Issues  sequencing ability decreased;step length decreased;weight-shifting ability decreased  -LR     Transfer, Impairments  impaired balance;pain;strength decreased   -LR     Transfer, Comment completed 2 sit/stand's from recliner then also from green straight back chair w/cuing to scoot fully to edge of surface as well as leaning forward for ws'ing   -ST Verbal cues to push up from chair to stand and to reach back for chair to sit instead of using RW for UE support. Assisted patient to and from bathroom. Cues for safety and correct hand placement with toilet t/f.   -LR     Recorded by [ST] YANG Barkley [LR] Sonali Tavares, PT     Gait Assessment/Treatment    Gait, [place] Level  verbal cues required;contact guard assist  -LR     Gait, Assistive Device  rolling walker  -LR     Gait, Distance (Feet)  400  -LR     Gait, Gait Pattern Analysis  swing-through gait  -LR     Gait, Gait Deviations  bilateral:;step length decreased;toe-to-floor clearance decreased;weight-shifting ability decreased  -LR     Gait, Safety Issues  step length decreased;weight-shifting ability decreased  -LR     Gait, Impairments  strength decreased;impaired balance;pain  -LR     Gait, Comment  Patient ambulated with step through gait pattern at slow pace with short step length. Mild improvement with cues for correction. Cues to stay inside of RW and keep it close. Gait limited by fatigue. Took one brief standing rest break.   -LR     Recorded by  [LR] Sonali Tavares, PT     Functional Mobility    Functional Mobility- Ind. Level contact guard assist  -ST      Functional Mobility- Device rolling walker  -ST      Functional Mobility-Distance (Feet) 350  -ST      Functional Mobility- Comment good overall balance and safety; no use of rwx in room   -ST      Recorded by [ST] YANG Barkley      Balance Skills Training    Sitting-Level of Assistance Independent  -ST      Sitting-Balance Support Feet supported  -ST      Standing-Level of Assistance Close supervision   reaching past midline in mult plns (limited L d/t RCT),   -ST      Standing-Balance Activities --   completed samir  chi exercises standing in room, OT close...  -ST      Standing Balance # of Minutes 12   SBA; completed for dynamic balance, UE/LE strength/ROM  -ST      Recorded by [ST] Sujey Montoya OTR      Therapy Exercises    Bilateral Lower Extremities  AROM:;15 reps;sitting;ankle pumps/circles;glut sets;heel slides;hip abduction/adduction;hip flexion;LAQ;quad sets;SLR;other reps   pillow squeezes; cues for technique  -LR     Recorded by  [LR] Sonali Tavares, PT     Positioning and Restraints    Pre-Treatment Position sitting in chair/recliner  -ST sitting in chair/recliner  -LR     Post Treatment Position chair  -ST chair  -LR     In Chair notified nsg;reclined;call light within reach;encouraged to call for assist  -ST notified nsg;sitting;call light within reach;encouraged to call for assist  -LR     Recorded by [ST] Sujey Montoya, OTR [LR] Sonali Tavares, PT       User Key  (r) = Recorded By, (t) = Taken By, (c) = Cosigned By    Initials Name Effective Dates     Sujey Montoya, OTR 02/20/17 -     LR Sonali Tavares, PT 06/19/15 -                 OT Goals       07/20/17 1300 07/18/17 0908       Bed Mobility OT LTG    Bed Mobility OT LTG, Time to Achieve  by discharge  -TB     Bed Mobility OT LTG, Activity Type  all bed mobility  -TB     Bed Mobility OT LTG, [place] Level  minimum assist (75% patient effort);verbal cues required  -TB     Bed Mobility OT LTG, Outcome goal ongoing  -ST      Transfer Training OT LTG    Transfer Training OT LTG, Time to Achieve  by discharge  -TB     Transfer Training OT LTG, Activity Type  toilet;tub  -TB     Transfer Training OT LTG, [place] Level  contact guard assist;verbal cues required  -TB     Transfer Training OT LTG, Assist Device  commode, bedside;walker, rolling  -TB     Transfer Training OT LTG, Additional Goal  educate for TTB  -TB     Transfer Training OT LTG, Outcome goal ongoing  -ST      Patient Education OT LTG    Patient Education OT  LTG, Time to Achieve  by discharge  -TB     Patient Education OT LTG, Education Type  written program;HEP;home safety;aware of neuro deficits  -TB     Patient Education OT LTG, Education Understanding  demonstrates adequately;verbalizes understanding  -TB     Patient Education OT LTG, Additional Goal  Pt completing SROM/walk-walks L UE - per outpt PT HEP  -TB     Patient Education OT LTG Outcome goal partially met  -ST      UB Dressing OT LTG    UB Dressing Goal OT LTG, Time to Achieve  by discharge  -TB     UB Dressing Goal OT LTG, [place] Level  minimum assist (75% patient effort)  -TB     UB Dressing Goal OT LTG, Additional Goal  via lei-technique  -TB     UB Dressing Goal OT LTG, Outcome goal ongoing  -ST        User Key  (r) = Recorded By, (t) = Taken By, (c) = Cosigned By    Initials Name Provider Type    TB Latonya Ramírez, OT Occupational Therapist     Sujey Montoya, OTR Occupational Therapist          Occupational Therapy Education     Title: PT OT SLP Therapies (Done)     Topic: Occupational Therapy (Done)     Point: ADL training (Done)    Description: Instruct learner(s) on proper safety adaptation and remediation techniques during self care or transfers.   Instruct in proper use of assistive devices.    Learning Progress Summary    Learner Readiness Method Response Comment Documented by Status   Patient Acceptance DANIELLE VAN E VU, DU HEP, balance, transfers, home safety  07/20/17 1413 Done    Acceptance DANIELLE BRENNAN NR Education initiated for benefits of therapy, role of OT, and recommendation for SNF level rehab at d/c. Education reinforced for HEP L shoulder.  07/18/17 0904 Done               Point: Home exercise program (Done)    Description: Instruct learner(s) on appropriate technique for monitoring, assisting and/or progressing therapeutic exercises/activities.    Learning Progress Summary    Learner Readiness Method Response Comment Documented by Status   Patient Acceptance DANIELLE VAN E VU, DU  HEP, balance, transfers, home safety  07/20/17 1413 Done    Acceptance E,D VU,NR Education initiated for benefits of therapy, role of OT, and recommendation for SNF level rehab at d/c. Education reinforced for HEP L shoulder.  07/18/17 0904 Done                      User Key     Initials Effective Dates Name Provider Type Discipline     06/22/15 -  Latonya Ramírez, OT Occupational Therapist OT    ST 02/20/17 -  YANG Barkley Occupational Therapist OT                  OT Recommendation and Plan  Anticipated Equipment Needs At Discharge: bedside commode, tub bench  Anticipated Discharge Disposition: skilled nursing facility  Therapy Frequency: daily  Plan of Care Review  Plan Of Care Reviewed With: patient  Outcome Summary/Follow up Plan: pt demo's good balance with dynamic exercises in room along with in room mobility w/o use of rwx; educated pt on transfer technique to improve safety and indepence for carry over with over surface and at home/community environments        Outcome Measures       07/20/17 1300 07/19/17 1122 07/18/17 0807    How much help from another person do you currently need...    Turning from your back to your side while in flat bed without using bedrails?  3  -LR     Moving from lying on back to sitting on the side of a flat bed without bedrails?  3  -LR     Moving to and from a bed to a chair (including a wheelchair)?  3  -LR     Standing up from a chair using your arms (e.g., wheelchair, bedside chair)?  3  -LR     Climbing 3-5 steps with a railing?  3  -LR     To walk in hospital room?  3  -LR     AM-PAC 6 Clicks Score  18  -LR     How much help from another is currently needed...    Putting on and taking off regular lower body clothing? 3  -ST  3  -TB    Bathing (including washing, rinsing, and drying) 3  -ST  3  -TB    Toileting (which includes using toilet bed pan or urinal) 3  -ST  3  -TB    Putting on and taking off regular upper body clothing 3  -ST  3  -TB    Taking  care of personal grooming (such as brushing teeth) 3  -ST  3  -TB    Eating meals 4  -ST  4  -TB    Score 19  -ST  19  -TB    Functional Assessment    Outcome Measure Options  AM-PAC 6 Clicks Basic Mobility (PT)  -LR AM-PAC 6 Clicks Daily Activity (OT)  -TB      07/18/17 0806          How much help from another person do you currently need...    Turning from your back to your side while in flat bed without using bedrails? 3  -SJ      Moving from lying on back to sitting on the side of a flat bed without bedrails? 3  -SJ      Moving to and from a bed to a chair (including a wheelchair)? 3  -SJ      Standing up from a chair using your arms (e.g., wheelchair, bedside chair)? 3  -SJ      Climbing 3-5 steps with a railing? 3  -SJ      To walk in hospital room? 3  -SJ      AM-PAC 6 Clicks Score 18  -SJ      Functional Assessment    Outcome Measure Options AM-PAC 6 Clicks Basic Mobility (PT)  -SJ        User Key  (r) = Recorded By, (t) = Taken By, (c) = Cosigned By    Initials Name Provider Type    TB Latonya Ramírez, OT Occupational Therapist    SJ Jenny Bryant, PT Physical Therapist    ST Sujey Montoya OTR Occupational Therapist    LR Sonali Tavares, PT Physical Therapist           Time Calculation:         Time Calculation- OT       07/20/17 1418          Time Calculation- OT    OT Start Time 1300  -ST      Total Timed Code Minutes- OT 34 minute(s)  -ST      OT Received On 07/20/17  -ST      OT Goal Re-Cert Due Date 07/28/17  -ST        User Key  (r) = Recorded By, (t) = Taken By, (c) = Cosigned By    Initials Name Provider Type     Sujey Montoya OTR Occupational Therapist           Therapy Charges for Today     Code Description Service Date Service Provider Modifiers Qty    [id number]  OT THERAPEUTIC ACT EA 15 MIN 7/20/2017 YANG Barkley GO 2               YANG Turner  7/20/2017

## 2017-07-20 NOTE — PLAN OF CARE
Problem: Patient Care Overview (Adult)  Goal: Plan of Care Review  Outcome: Ongoing (interventions implemented as appropriate)    07/19/17 2107 07/20/17 0306   Coping/Psychosocial Response Interventions   Plan Of Care Reviewed With patient --    Patient Care Overview   Progress --  improving   Outcome Evaluation   Outcome Summary/Follow up Plan --  Patient rested well this shift. No complaints of pain or discomfort. Ambulated in hallway this shift with rolling walker. Tolerated well.

## 2017-07-21 VITALS
RESPIRATION RATE: 18 BRPM | WEIGHT: 175.49 LBS | OXYGEN SATURATION: 96 % | BODY MASS INDEX: 29.24 KG/M2 | SYSTOLIC BLOOD PRESSURE: 127 MMHG | HEIGHT: 65 IN | TEMPERATURE: 98 F | HEART RATE: 69 BPM | DIASTOLIC BLOOD PRESSURE: 62 MMHG

## 2017-07-21 PROCEDURE — 99239 HOSP IP/OBS DSCHRG MGMT >30: CPT | Performed by: NURSE PRACTITIONER

## 2017-07-21 PROCEDURE — 97110 THERAPEUTIC EXERCISES: CPT

## 2017-07-21 PROCEDURE — 97116 GAIT TRAINING THERAPY: CPT

## 2017-07-21 RX ADMIN — Medication 2 TABLET: at 08:17

## 2017-07-21 RX ADMIN — LISINOPRIL 5 MG: 5 TABLET ORAL at 08:17

## 2017-07-21 RX ADMIN — FAMOTIDINE 20 MG: 20 TABLET ORAL at 08:17

## 2017-07-21 NOTE — THERAPY TREATMENT NOTE
Acute Care - Physical Therapy Treatment Note  [facility]     Patient Name: Omayra Bhardwaj  : 1935  MRN: [id number]  Today's Date: 2017  Onset of Illness/Injury or Date of Surgery Date: 07/15/17  Date of Referral to PT: 17  Referring Physician: Jonathan    Admit Date: 7/15/2017    Visit Dx:    ICD-10-CM ICD-9-CM   1. Subarachnoid hemorrhage, traumatic, without loss of consciousness, initial encounter S06.6X0A 852.01   2. Closed fracture of nasal bone, initial encounter S02.2XXA 802.0   3. Maxillary sinus fracture, closed, initial encounter S02.401A 802.4   4. Chest wall contusion, left, initial encounter S20.212A 922.1   5. Impaired mobility and ADLs Z74.09 799.89   6. Impaired functional mobility, balance, gait, and endurance Z74.09 V49.89     Patient Active Problem List   Diagnosis   • Hypertensive cardiovascular disease   • Dyslipidemia   • Chronic hypertension   • History of obesity   • Depression   • Chronic lymphedema   • Thyroid nodule   • Hyperparathyroidism   • Disordered sleep   • GERD (gastroesophageal reflux disease)   • Traumatic SAH (subarachnoid hemorrhage)   • Subarachnoid hemorrhage, traumatic   • UTI (urinary tract infection)               Adult Rehabilitation Note       17 1005 17 1300 17 1122    Rehab Assessment/Intervention    Discipline physical therapist  -SC occupational therapist  -ST physical therapist  -LR    Document Type therapy note (daily note)  -SC therapy note (daily note)  -ST therapy note (daily note)  -LR    Subjective Information agree to therapy  -SC no complaints;agree to therapy  -ST agree to therapy;no complaints   reports she wants to walk further today  -LR    Patient Effort, Rehab Treatment excellent  -SC excellent  -ST excellent  -LR    Symptoms Noted During/After Treatment fatigue;other (see comments)   L foot numbness after walk--RN notified  -SC none  -ST fatigue  -LR    Symptoms Noted Comment L foot red on under surface,  blanchable  -SC      Precautions/Limitations  fall precautions  -ST fall precautions  -LR    Specific Treatment Considerations  c/o L rib pain  -ST c/o rib pain on L when coughing/sneezing  -LR    Recorded by [SC] Oziel Mabry, PT [ST] Sujey Montoya, OTR [LR] Sonali Tavares, PT    Pain Assessment    Pain Assessment [employer] FACES  -SC 0-10  -ST 0-10  -LR    [employer] FACES Pain Rating 0  -SC      Pain Score 0  -SC 1  -ST 0  -LR    Post Pain Score  1  -ST 0  -LR    Pain Type  Acute pain  -ST     Pain Location  Rib cage  -ST     Pain Intervention(s)  Repositioned;Ambulation/increased activity  -ST     Recorded by [SC] Shearon A Raghavendra, PT [ST] Sujey Montoya, OTR [LR] Sonali Tavares, PT    Cognitive Assessment/Intervention    Current Cognitive/Communication Assessment functional  -SC functional  -ST functional  -LR    Orientation Status oriented x 4  -SC oriented x 4  -ST oriented x 4;required verbal cueing (specifiy in comments)  -LR    Follows Commands/Answers Questions 100% of the time  -% of the time  -% of the time;able to follow single-step instructions;needs cueing;needs repetition  -LR    Personal Safety WNL/WFL  -SC WNL/WFL  -ST WNL/WFL  -LR    Personal Safety Interventions fall prevention program maintained  -SC fall prevention program maintained;gait belt;nonskid shoes/slippers when out of bed  -ST     Recorded by [SC] Oziel Mabry, PT [ST] Sujey Montoya, OTR [LR] Sonali Tavares, PT    Bed Mobility, Assessment/Treatment    Bed Mob, Supine to Sit, [place]   not tested   [facility] on arrival.   -LR    Bed Mob, Sit to Supine, [place]   not tested   UIC at end of treatment.   -LR    Bed Mobility, Comment deferred- up in chair  -SC UIC  -ST     Recorded by [SC] Oziel Mabry, PT [ST] Sujey Montoya, OTR [LR] Sonali Tavares PT    Transfer Assessment/Treatment    Transfers, Sit-Stand [place] contact guard assist  -SC stand by assist  -ST verbal cues  required;contact guard assist  -LR    Transfers, Stand-Sit [place] contact guard assist  -SC stand by assist  -ST verbal cues required;contact guard assist  -LR    Transfers, Sit-Stand-Sit, Assist Device rolling walker  -SC rolling walker  -ST rolling walker  -LR    Toilet Transfer, [place]   verbal cues required;contact guard assist  -LR    Toilet Transfer, Assistive Device   rolling walker  -LR    Transfer, Safety Issues   sequencing ability decreased;step length decreased;weight-shifting ability decreased  -LR    Transfer, Impairments impaired balance;motor control impaired  -SC  impaired balance;pain;strength decreased  -LR    Transfer, Comment slow moving but good technique  -SC completed 2 sit/stand's from recliner then also from green straight back chair w/cuing to scoot fully to edge of surface as well as leaning forward for ws'ing   -ST Verbal cues to push up from chair to stand and to reach back for chair to sit instead of using RW for UE support. Assisted patient to and from bathroom. Cues for safety and correct hand placement with toilet t/f.   -LR    Recorded by [SC] Oziel Mabry, PT [ST] Sujey Montoya OTR [LR] Sonali Tavares, PT    Gait Assessment/Treatment    Gait, [place] Level contact guard assist  -SC  verbal cues required;contact guard assist  -LR    Gait, Assistive Device rolling walker  -SC  rolling walker  -LR    Gait, Distance (Feet) 450  -SC  400  -LR    Gait, Gait Pattern Analysis swing-through gait  -SC  swing-through gait  -LR    Gait, Gait Deviations forward flexed posture;nael decreased  -SC  bilateral:;step length decreased;toe-to-floor clearance decreased;weight-shifting ability decreased  -LR    Gait, Safety Issues   step length decreased;weight-shifting ability decreased  -LR    Gait, Impairments strength decreased;impaired balance  -SC  strength decreased;impaired balance;pain  -LR    Gait, Comment Took x2 standing rest. Cues for posture  -SC  Patient  ambulated with step through gait pattern at slow pace with short step length. Mild improvement with cues for correction. Cues to stay inside of RW and keep it close. Gait limited by fatigue. Took one brief standing rest break.   -LR    Recorded by [SC] Oziel Mabry, PT  [LR] Sonali Tavares, PT    Functional Mobility    Functional Mobility- Ind. Level  contact guard assist  -     Functional Mobility- Device  rolling walker  -     Functional Mobility-Distance (Feet)  350  -ST     Functional Mobility- Comment  good overall balance and safety; no use of rwx in room   -ST     Recorded by  [ST] Sujey Montoya OTR     Balance Skills Training    Sitting-Level of Assistance  Independent  -     Sitting-Balance Support  Feet supported  -     Standing-Level of Assistance  Close supervision   reaching past midline in mult plns (limited L d/t RCT),   -     Standing-Balance Activities  --   completed samir chi exercises standing in room, OT close...  -     Standing Balance # of Minutes  12   SBA; completed for dynamic balance, UE/LE strength/ROM  -     Gait Balance-Level of Assistance Contact guard  -SC      Gait Balance Support assistive device  -SC      Recorded by [SC] Oziel Mabry, PT [ST] Sujey Montoya OTR     Therapy Exercises    Bilateral Lower Extremities 10 reps;AROM:;sitting;ankle pumps/circles;hip flexion;LAQ  -SC  AROM:;15 reps;sitting;ankle pumps/circles;glut sets;heel slides;hip abduction/adduction;hip flexion;LAQ;quad sets;SLR;other reps   pillow squeezes; cues for technique  -LR    Bilateral Upper Extremity AROM:;15 reps;sitting;elbow flexion/extension;shoulder abduction/adduction  -SC      Recorded by [SC] Oziel Mabry, PT  [LR] Sonali Tavares, PT    Positioning and Restraints    Pre-Treatment Position sitting in chair/recliner  -SC sitting in chair/recliner  - sitting in chair/recliner  -LR    Post Treatment Position chair  -SC chair  -ST chair  -LR    In Chair  sitting;reclined;notified nsg;call light within reach  -SC notified nsg;reclined;call light within reach;encouraged to call for assist  -ST notified nsg;sitting;call light within reach;encouraged to call for assist  -LR    Recorded by [SC] Oziel Mabry, PT [ST] Sujey Montoya, OTR [LR] Sonali Tavares, PT      User Key  (r) = Recorded By, (t) = Taken By, (c) = Cosigned By    Initials Name Effective Dates    SC Oziel Mabry, PT 06/19/15 -     ST Sujey Montoya, OTR 02/20/17 -     LR Sonali Tavares, PT 06/19/15 -                 IP PT Goals       07/19/17 1122 07/18/17 0903       Gait Training PT LTG    Gait Training Goal PT LTG, Date Established 07/18/17  -LR 07/18/17  -SJ     Gait Training Goal PT LTG, Time to Achieve 2 wks  -LR 2 wks  -SJ     Gait Training Goal PT LTG, [place] Level conditional independence  -LR conditional independence  -SJ     Gait Training Goal PT LTG, Assist Device cane, straight  -LR cane, straight  -SJ     Gait Training Goal PT LTG, Distance to Achieve 800 feet  -  -SJ     Gait Training Goal PT LTG, Date Goal Reviewed 07/19/17  -LR      Gait Training Goal PT LTG, Outcome goal ongoing  -LR goal ongoing  -SJ     Dynamic Standing Balance PT LTG    Dynamic Standing Balance PT LTG, Date Established 07/18/17  -LR 07/18/17  -SJ     Dynamic Standing Balance PT LTG, Time to Achieve 2 wks  -LR 2 wks  -SJ     Dynamic Standing Balance PT LTG, [place] Level conditional independence  -LR conditional independence  -SJ     Dynamic Standing Balance PT LTG, Date Goal Reviewed 07/19/17  -LR      Dynamic Standing Balance PT LTG, Outcome goal ongoing  -LR goal ongoing  -SJ       User Key  (r) = Recorded By, (t) = Taken By, (c) = Cosigned By    Initials Name Provider Type    EDD Bryant, PT Physical Therapist    LR Sonali Tavares, PT Physical Therapist          Physical Therapy Education     Title: PT OT SLP Therapies (Done)     Topic: Physical Therapy (Done)      Point: Mobility training (Done)    Learning Progress Summary    Learner Readiness Method Response Comment Documented by Status   Patient Eager E CATALINA,CUATE reviewed safety with obility SC 07/21/17 1213 Done    Acceptance E,D VU,NR Educated on benefits of frequent ambulation, need for RW for safety at this time. Educated on use of pillow for splinting when coughing/sneezing. LR 07/19/17 1157 Done    Acceptance E,D NR   07/18/17 0907 Active               Point: Home exercise program (Done)    Learning Progress Summary    Learner Readiness Method Response Comment Documented by Status   Patient Eager E CUATE ARNOLD reviewed safety with obility SC 07/21/17 1213 Done    Acceptance E,D JUDITH,NR Educated on benefits of frequent ambulation, need for RW for safety at this time. Educated on use of pillow for splinting when coughing/sneezing. LR 07/19/17 1157 Done    Acceptance E,D NR   07/18/17 0907 Active               Point: Body mechanics (Done)    Learning Progress Summary    Learner Readiness Method Response Comment Documented by Status   Patient Eager E CUATE ARNOLD reviewed safety with obility SC 07/21/17 1213 Done    Acceptance E,D JUDITH,NR Educated on benefits of frequent ambulation, need for RW for safety at this time. Educated on use of pillow for splinting when coughing/sneezing. LR 07/19/17 1157 Done    Acceptance E,D NR   07/18/17 0907 Active               Point: Precautions (Done)    Learning Progress Summary    Learner Readiness Method Response Comment Documented by Status   Patient Eager E CUATE ARNOLD reviewed safety with obility SC 07/21/17 1213 Done    Acceptance E,D JUDITH,NR Educated on benefits of frequent ambulation, need for RW for safety at this time. Educated on use of pillow for splinting when coughing/sneezing.  07/19/17 1157 Done    Acceptance E,D NR   07/18/17 0907 Active                      User Key     Initials Effective Dates Name Provider Type Discipline    SC 06/19/15 -  Oziel Mabry, PT Physical Therapist PT     SJ 06/19/15 -  Jenny Bryant, PT Physical Therapist PT    LR 06/19/15 -  Sonali Tavares, PT Physical Therapist PT                    PT Recommendation and Plan  Anticipated Discharge Disposition: inpatient rehabilitation facility  Planned Therapy Interventions: balance training, gait training, home exercise program, patient/family education, strengthening, transfer training  PT Frequency: daily  Plan of Care Review  Plan Of Care Reviewed With: patient  Progress: progress toward functional goals as expected  Outcome Summary/Follow up Plan: Up participating in Pt. Gaining overall strength.           Outcome Measures       07/21/17 1005 07/20/17 1300 07/19/17 1122    How much help from another person do you currently need...    Turning from your back to your side while in flat bed without using bedrails? 4  -SC  3  -LR    Moving from lying on back to sitting on the side of a flat bed without bedrails? 3  -SC  3  -LR    Moving to and from a bed to a chair (including a wheelchair)? 3  -SC  3  -LR    Standing up from a chair using your arms (e.g., wheelchair, bedside chair)? 4  -SC  3  -LR    Climbing 3-5 steps with a railing? 3  -SC  3  -LR    To walk in hospital room? 3  -SC  3  -LR    AM-PAC 6 Clicks Score 20  -SC  18  -LR    How much help from another is currently needed...    Putting on and taking off regular lower body clothing?  3  -ST     Bathing (including washing, rinsing, and drying)  3  -ST     Toileting (which includes using toilet bed pan or urinal)  3  -ST     Putting on and taking off regular upper body clothing  3  -ST     Taking care of personal grooming (such as brushing teeth)  3  -ST     Eating meals  4  -ST     Score  19  -ST     Functional Assessment    Outcome Measure Options AM-PAC 6 Clicks Basic Mobility (PT)  -SC  AM-PAC 6 Clicks Basic Mobility (PT)  -LR      User Key  (r) = Recorded By, (t) = Taken By, (c) = Cosigned By    Initials Name Provider Type    HANG Mabry, PT Physical  Therapist    ST Sujey Montoya, OTR Occupational Therapist    LR Sonali Tavares, PT Physical Therapist           Time Calculation:         PT Charges       07/21/17 1216          Time Calculation    Start Time 1005  -SC      PT Received On 07/21/17  -SC      PT Goal Re-Cert Due Date 07/28/17  -SC      Time Calculation- PT    Total Timed Code Minutes- PT 23 minute(s)  -SC        User Key  (r) = Recorded By, (t) = Taken By, (c) = Cosigned By    Initials Name Provider Type    SC Oziel Mabry, PT Physical Therapist          Therapy Charges for Today     Code Description Service Date Service Provider Modifiers Qty    94680896649 HC GAIT TRAINING EA 15 MIN 7/21/2017 Oziel Mabry, PT GP 1    92573446584 HC PT THER PROC EA 15 MIN 7/21/2017 Oziel Mabry, PT GP 1          PT G-Codes  Outcome Measure Options: AM-PAC 6 Clicks Basic Mobility (PT)    Oziel Mabry PT  7/21/2017

## 2017-07-21 NOTE — PROGRESS NOTES
"Adult Nutrition  Assessment/PES    Patient Name:  Omayra Bhardwaj  YOB: 1935  MRN: [id number]  Admit Date:  7/15/2017    Assessment Date:  7/21/2017        Reason for Assessment       07/21/17 1343    Reason for Assessment    Reason For Assessment/Visit follow up protocol    Time Spent (min) 20    Diagnosis --   per notes this admission                Anthropometrics       07/21/17 1344    Anthropometrics    Height 165.1 cm (65\")    Weight 79.6 kg (175 lb 7.8 oz)    Ideal Body Weight (IBW)    Ideal Body Weight (IBW), Female 57.69    % Ideal Body Weight 138.27    Body Mass Index (BMI)    BMI (kg/m2) 29.26            Labs/Tests/Procedures/Meds       07/21/17 1344    Labs/Tests/Procedures/Meds    Labs/Tests Review Reviewed                Nutrition Prescription Ordered       07/21/17 1344    Nutrition Prescription PO    Current PO Diet Soft Texture    Texture Ground            Evaluation of Received Nutrient/Fluid Intake       07/21/17 1344    PO Evaluation    Number of Meals 4    % PO Intake 73              Problem/Interventions:        Problem 1       07/21/17 1345    Nutrition Diagnoses Problem 1    Problem 1 No Nutrition Diagnosis at this Time                    Intervention Goal       07/21/17 1345    Intervention Goal    General Nutrition support treatment            Nutrition Intervention       07/21/17 1345    Nutrition Intervention    RD/Tech Action Follow Tx progress;Care plan reviewd              Education/Evaluation       07/21/17 1345    Monitor/Evaluation    Monitor Per protocol;PO intake        Comments:      Electronically signed by:  Brittani Caraballo CNA  07/21/17 1:45 PM  "

## 2017-07-21 NOTE — PROGRESS NOTES
Continued Stay Note   [facility]     Patient Name: Omayra Bhardwaj  MRN: [id number]  Today's Date: 7/21/2017    Admit Date: 7/15/2017          Discharge Plan       07/21/17 1018    Case Management/Social Work Plan    Plan Home with [facility]    Patient/Family In Agreement With Plan yes    Additional Comments Received calls from [facility] saying that Ms. Bhardwaj has been denied for inpatient rehab by [employer], as she is already walking 400 ft.  Notified Ms. Bhardwaj at the bedside and she requested [facility] for home health SN, PT and OT.  Referred Ms. Bhardwaj to [facility] with [facility].  Ms. Bhardwaj said that she has sufficient DME in the home.  Patient also said that she will have assistance from her two daughters, Prabhu and Lisa throughout the weekend and after they work during the week.  She said that her neighbors can also assist her.  Contacted Prabhu, ph [phone] and left voice message.  Per patient, Prabhu will be transporting her home when discharged.  CM will continue to follow.                 Discharge Codes     None        Expected Discharge Date and Time     Expected Discharge Date Expected Discharge Time    Jul 24, 2017             Amanda Rahman

## 2017-07-21 NOTE — PLAN OF CARE
Problem: Pain, Acute (Adult)  Goal: Identify Related Risk Factors and Signs and Symptoms  Outcome: Ongoing (interventions implemented as appropriate)    07/21/17 0133   Pain, Acute   Related Risk Factors (Acute Pain) knowledge deficit   Signs and Symptoms (Acute Pain) alteration in muscle tone

## 2017-07-21 NOTE — PLAN OF CARE
Problem: Fall Risk (Adult)  Goal: Identify Related Risk Factors and Signs and Symptoms  Outcome: Ongoing (interventions implemented as appropriate)    07/21/17 0153   Fall Risk   Fall Risk: Related Risk Factors history of falls         Problem: Pain, Acute (Adult)  Goal: Acceptable Pain Control/Comfort Level  Outcome: Ongoing (interventions implemented as appropriate)    07/21/17 0153   Pain, Acute (Adult)   Acceptable Pain Control/Comfort Level making progress toward outcome

## 2017-07-21 NOTE — PLAN OF CARE
Problem: Skin Integrity Impairment, Risk/Actual (Adult)  Intervention: Promote/Optimize Nutrition    07/18/17 0800 07/21/17 0922   Hygiene Care Assistance   Oral Care --  teeth brushed   Nutrition Interventions   Oral Nutrition Promotion rest periods promoted --        Intervention: Prevent/Manage Excess Moisture    07/18/17 1800 07/19/17 0800   Hygiene Care Assistance   Perineal Care absorbent pad changed --    Skin Interventions   Skin Protection --  adhesive use limited       Intervention: Prevent/Minimize Sheer/Friction Injuries    07/19/17 0800 07/21/17 0500 07/21/17 0922   Skin Interventions   Pressure Reduction Techniques frequent weight shift encouraged --  --    Pressure Reduction Devices --  --  positioning supports utilized   Positioning   Positioning/Transfer Devices --  pillows;in use --          Goal: Identify Related Risk Factors and Signs and Symptoms  Outcome: Outcome(s) achieved Date Met:  07/21/17 07/21/17 0134 07/21/17 0922   Skin Integrity Impairment, Risk/Actual   Skin Integrity Impairment, Risk/Actual: Related Risk Factors traumatic injury --    Signs and Symptoms (Skin Integrity Impairment) --  other (see comments)  (s/p fall; bruising throughout )       Goal: Skin Integrity/Wound Healing    07/21/17 0134   Skin Integrity Impairment, Risk/Actual (Adult)   Skin Integrity/Wound Healing making progress toward outcome         Problem: Stroke (Hemorrhagic) (Adult)  Intervention: Monitor/Assist with Self Care    07/18/17 1800 07/20/17 0810 07/21/17 0700   Monitor/Assist with Self Care   Ambulation --  --  --    Transferring --  --  --    Toileting --  --  --    Bathing --  --  --    Dressing --  --  --    Eating --  --  --    Communication --  --  --    Swallowing (if score 2 or more for any item, consult Rehab Services) --  0-->swallows foods/liquids without difficulty --    Activity   Activity Type --  --  activity adjusted per tolerance;activity encouraged   Activity Level of Assistance --   --  assistance, 1 person   Musculoskeletal Interventions   Self-Care Promotion independence encouraged --  --      07/21/17 0754   Monitor/Assist with Self Care   Ambulation 3-->assistive equipment and person   Transferring 3-->assistive equipment and person   Toileting 3-->assistive equipment and person   Bathing 2-->assistive person   Dressing 0-->independent   Eating 0-->independent   Communication 0-->understands/communicates without difficulty   Swallowing (if score 2 or more for any item, consult Rehab Services) --    Activity   Activity Type --    Activity Level of Assistance --    Musculoskeletal Interventions   Self-Care Promotion --        Intervention: Provide Oxygenation/Ventilation/Perfusion Support    07/19/17 0800 07/21/17 0500 07/21/17 0700   Respiratory Interventions   Airway/Ventilation Management --  --  --    Activity   Activity Type --  --  activity adjusted per tolerance;activity encouraged   Safety Interventions   Medication Review/Management medications reviewed --  --    Positioning   Head Of Bed (HOB) Position --  HOB elevated --      07/21/17 0922   Respiratory Interventions   Airway/Ventilation Management airway patency maintained;other (see comments)  (room air )   Activity   Activity Type --    Safety Interventions   Medication Review/Management --    Positioning   Head Of Bed (HOB) Position --        Intervention: Promote Effective Elimination    07/18/17 0800 07/18/17 2106 07/19/17 1800   Monitor/Manage Chemotherapy Gastrointestinal Effects   Bowel Dysfunction Management --  toileting offered --    Gastrointestinal (GI) Interventions   Bowel Program maintenance program followed --  --    Genitourinary () Interventions   Urinary Elimination Promotion --  --  toileting offered;toileting scheduled       Intervention: Support Psychosocial Response to Stroke    07/18/17 0800 07/18/17 2106 07/20/17 0810   Coping Strategies   Family/Support System Care self-care encouraged --  --     Supportive Measures --  --  active listening utilized   Coping/Psychosocial Interventions   Environmental Support --  calm environment promoted --        Intervention: Protect/Optimize Cerebral Perfusion    07/18/17 0800 07/21/17 0500   Positioning   Head Of Bed (HOB) Position --  HOB elevated   Neurological Interventions   Cerebral Edema Prevention/Management blood pressure monitored;sedation level decreased --    Cognitive Interventions   Sensory Stimulation Regulation auditory stimulation minimized --        Intervention: Prevent/Minimize Sheer/Friction Injuries    07/19/17 0800 07/21/17 0500 07/21/17 0922   Skin Interventions   Pressure Reduction Techniques frequent weight shift encouraged --  --    Pressure Reduction Devices --  --  positioning supports utilized   Positioning   Positioning/Transfer Devices --  pillows;in use --        Intervention: Prevent/Manage DVT/VTE Risk    07/21/17 0754   Support Surgical/Anesthesia Recovery   Venous Thromboembolism Prevent/Manage bilateral;sequential compression devices off;foot pump device off       Intervention: Monitor/Manage Seizure Activity    07/18/17 1800   Safety Interventions   Seizure Precautions clutter-free environment maintained       Intervention: Monitor/Manage Eating/Swallowing Impairment    07/18/17 0800 07/18/17 1800   Safety Interventions   Aspiration Precautions --  awake/alert before oral intake   Nutrition Interventions   Swallowing Techniques: Dysphagia appropriate positioning encouraged --        Intervention: Promote Effective Communication    07/21/17 0922   Cognitive Interventions   Communication Enhancement Strategies call light answered in person       Intervention: Promote/Optimize Sensory/Motor Ability    07/18/17 0800   Cognitive Interventions   Sensory Stimulation Regulation auditory stimulation minimized           Problem: Patient Care Overview (Adult)  Goal: Plan of Care Review  Outcome: Outcome(s) achieved Date Met:  07/21/17     07/21/17 0134 07/21/17 0754 07/21/17 0922   Coping/Psychosocial Response Interventions   Plan Of Care Reviewed With --  patient --    Patient Care Overview   Progress progress toward functional goals as expected --  --    Outcome Evaluation   Outcome Summary/Follow up Plan --  --  pt gait is steady; alert and oriented.        Goal: Adult Individualization and Mutuality  Outcome: Ongoing (interventions implemented as appropriate)    07/21/17 0922   Individualization   Patient Specific Preferences To be very active        Goal: Discharge Needs Assessment    07/18/17 0807 07/18/17 1617 07/21/17 0922   Discharge Needs Assessment   Readmission Within The Last 30 Days --  no previous admission in last 30 days --    Equipment Needed After Discharge --  none --    Discharge Disposition --  another healthcare facility, not defined --    Current Health   Outpatient/Agency/Support Group Needs --  --  inpatient rehabilitation facility (specify)   Anticipated Changes Related to Illness --  none --    Living Environment   Transportation Available --  none --    Self-Care   Equipment Currently Used at Home grab bar;rollator --  --          Problem: Fall Risk (Adult)  Intervention: Monitor/Assist with Self Care    07/18/17 1800 07/20/17 0810 07/21/17 0700   Monitor/Assist with Self Care   Ambulation --  --  --    Transferring --  --  --    Toileting --  --  --    Bathing --  --  --    Dressing --  --  --    Eating --  --  --    Communication --  --  --    Swallowing (if score 2 or more for any item, consult Rehab Services) --  0-->swallows foods/liquids without difficulty --    Activity   Activity Type --  --  activity adjusted per tolerance;activity encouraged   Activity Level of Assistance --  --  assistance, 1 person   Musculoskeletal Interventions   Self-Care Promotion independence encouraged --  --      07/21/17 0754   Monitor/Assist with Self Care   Ambulation 3-->assistive equipment and person   Transferring 3-->assistive  equipment and person   Toileting 3-->assistive equipment and person   Bathing 2-->assistive person   Dressing 0-->independent   Eating 0-->independent   Communication 0-->understands/communicates without difficulty   Swallowing (if score 2 or more for any item, consult Rehab Services) --    Activity   Activity Type --    Activity Level of Assistance --    Musculoskeletal Interventions   Self-Care Promotion --        Intervention: Reduce Risk/Promote Restraint Free Environment    07/21/17 0754   Safety Interventions   Safety/Security Measures bed alarm set   Environmental Safety Modification assistive device/personal items within reach;clutter free environment maintained;lighting adjusted;room organization consistent;mobility aid in reach   Restraint Interventions   Safety Promotion/Fall Prevention safety round/check completed;fall prevention program maintained;gait belt;toileting scheduled;supervised activity;nonskid shoes/slippers when out of bed       Intervention: Review Medications/Identify Contributors to Fall Risk    07/19/17 0800   Safety Interventions   Medication Review/Management medications reviewed         Goal: Identify Related Risk Factors and Signs and Symptoms  Outcome: Outcome(s) achieved Date Met:  07/21/17 07/19/17 1749 07/21/17 0153   Fall Risk   Fall Risk: Related Risk Factors --  history of falls   Fall Risk: Signs and Symptoms presence of risk factors --

## 2017-07-21 NOTE — PLAN OF CARE
Problem: Skin Integrity Impairment, Risk/Actual (Adult)  Goal: Identify Related Risk Factors and Signs and Symptoms  Outcome: Ongoing (interventions implemented as appropriate)  Goal: Skin Integrity/Wound Healing  Outcome: Ongoing (interventions implemented as appropriate)    07/21/17 0134   Skin Integrity Impairment, Risk/Actual (Adult)   Skin Integrity/Wound Healing making progress toward outcome         Problem: Patient Care Overview (Adult)  Goal: Plan of Care Review  Outcome: Ongoing (interventions implemented as appropriate)    07/21/17 0134   Coping/Psychosocial Response Interventions   Plan Of Care Reviewed With patient   Patient Care Overview   Progress progress toward functional goals as expected         Problem: Fall Risk (Adult)  Goal: Identify Related Risk Factors and Signs and Symptoms  Outcome: Ongoing (interventions implemented as appropriate)    07/21/17 0134   Fall Risk   Fall Risk: Related Risk Factors history of falls

## 2017-07-21 NOTE — PLAN OF CARE
Problem: Patient Care Overview (Adult)  Goal: Plan of Care Review  Outcome: Ongoing (interventions implemented as appropriate)    07/21/17 1214   Coping/Psychosocial Response Interventions   Plan Of Care Reviewed With patient   Patient Care Overview   Progress progress toward functional goals as expected   Outcome Evaluation   Outcome Summary/Follow up Plan Up participating in Pt. Gaining overall strength.          Problem: Inpatient Physical Therapy  Goal: Gait Training Goal LTG- PT  Outcome: Ongoing (interventions implemented as appropriate)    07/19/17 1122   Gait Training PT LTG   Gait Training Goal PT LTG, Date Established 07/18/17   Gait Training Goal PT LTG, Time to Achieve 2 wks   Gait Training Goal PT LTG, [place] Level conditional independence   Gait Training Goal PT LTG, Assist Device cane, straight   Gait Training Goal PT LTG, Distance to Achieve 800 feet   Gait Training Goal PT LTG, Date Goal Reviewed 07/19/17   Gait Training Goal PT LTG, Outcome goal ongoing       Goal: Dynamic Standing Balance Goal LTG- PT  Outcome: Ongoing (interventions implemented as appropriate)    07/19/17 1122   Dynamic Standing Balance PT LTG   Dynamic Standing Balance PT LTG, Date Established 07/18/17   Dynamic Standing Balance PT LTG, Time to Achieve 2 wks   Dynamic Standing Balance PT LTG, [place] Level conditional independence   Dynamic Standing Balance PT LTG, Date Goal Reviewed 07/19/17   Dynamic Standing Balance PT LTG, Outcome goal ongoing

## 2017-07-21 NOTE — DISCHARGE SUMMARY
[facility] Medicine Services  DISCHARGE SUMMARY       Date of Admission: 7/15/2017  Date of Discharge:  7/21/2017  Primary Care Physician: Kitty Romero MD  Consulting Physician(s)          None           Discharge Diagnoses:  Active Hospital Problems (** Indicates Principal Problem)    Diagnosis Date Noted   • **Traumatic SAH (subarachnoid hemorrhage) [I60.9] 07/15/2017   • GERD (gastroesophageal reflux disease) [K21.9] 07/15/2017   • UTI (urinary tract infection) [N39.0] 07/15/2017   • Thyroid nodule [E04.1]      workup pending.     • Hypertensive cardiovascular disease [I11.9]      Probable  a. Longstanding hypertension with recent increased palpitations and echocardiogram demonstrating reduced LVEF (0.50) with mild TR with abnormal EKG revealing mild nonspecific T-wave changes, winter 2013.   b. Acceptable event recorder with continued medical therapy felt warranted.  c. Nuclear stress test showing a normal Lexiscan with LVEF (0.74), 05/28/2015.  d. Residual class I symptoms.      • Hyperparathyroidism [E21.3]      with contemplated parathyroidectomy (Dr. Davis, [facility]).     • Dyslipidemia [E78.5]       untreated.     • Disordered sleep [G47.9]      with prominent snoring and occasional nocturnal hypersomnolence.      • Chronic lymphedema [I89.0]      with probable chronic lower extremity venous insufficiency with recent acceptable bilateral lower extremity venous duplex study, March 2013.     • Chronic hypertension [I10]      probably essential.       • Depression [F32.9]       Resolved Hospital Problems    Diagnosis Date Noted Date Resolved   No resolved problems to display.       Presenting Problem/History of Present Illness  Subarachnoid hemorrhage, traumatic, without loss of consciousness, initial encounter [S06.6X0A]     Chief Complaint on Day of Discharge: FU fall at home    History of Present Illness on Day of Discharge:   Resting  comfortably in chair this morning with no complaints.  She was told that insurance declined an inpatient rehab stay.  She is okay with going home with home health and has family that is available to check in on her.      Hospital Course  Patient is a 81 y.o. female with a past medical history significant for hypertension, hyperlipidemia, and hypertensive heart disease with a prior negative David CT scan on 5/28/2015 followed by Dr. Spence, who passed out in the bathroom after having a bowel movement on 7/15/2017.  She hit her head and had facial trauma and did lose consciousness.  Upon awakening she noticed a large amount of thick blood around her face.  She was brought to the emergency department for further evaluation and treatment.  Workup in the emergency department revealed possible small right temporal subarachnoid hemorrhage as well as nasal bone fractures and several possible nondisplaced sinus fractures.  She was intermittently drowsy in the emergency department.  She also had a CT of her C-spine which showed degenerative changes but no traumatic abnormality.  She was noted to have a urinalysis that looked consistent with UTI.  She was started on Rocephin in the emergency department and received 5 days total.  Her urine culture grew out normal urogenital kenny.  She was admitted to the intensive care unit for close monitoring.  The emergency department consulted to Dr. Gutierrez with neurosurgery to see the patient.  Her subarachnoid hemorrhage was felt to be very small and she quickly returned to her neurological baseline.  There was some concern that she may have vertebrobasilar insufficiency but she did not complain of significant vertigo and has been quite active up to this point.  The patient was monitored closely in the ICU and then sent out to the hospital floor where Hospital medicine services assumed care.  The patient and her family were interested in inpatient rehabilitation.  Referrals were made,  however, the patient was denying for acute rehabilitation.  She will be discharged home with home health today.  She had been walking in the hallway with physical therapy with no difficulty.  She should follow-up with her PCP in 1-2 weeks and she'll follow-up with neurosurgery on an as-needed basis.    Procedures Performed         Consults:   Consults     Date and Time Order Name Status Description    7/15/2017 2121 Inpatient Consult to Neurosurgery Completed           Pertinent Test Results:  Lab Results   Component Value Date    WBC 5.04 07/18/2017    HGB 11.5 07/18/2017    HCT 36.1 07/18/2017    MCV 96.0 07/18/2017     07/18/2017     Lab Results   Component Value Date    GLUCOSE 104 (H) 07/18/2017    CALCIUM 10.5 (H) 07/18/2017     07/18/2017    K 4.2 07/18/2017    CO2 29.0 07/18/2017     (H) 07/18/2017    BUN 15 07/18/2017    CREATININE 0.80 07/18/2017    EGFRIFNONA 69 07/18/2017    BCR 18.8 07/18/2017    ANIONGAP -1.0 (L) 07/18/2017     Imaging Results (last 7 days)     Procedure Component Value Units Date/Time    CT Chest Without Contrast [[id number]]  (Abnormal) Collected:  07/15/17 1726     Updated:  07/15/17 1857    Narrative:       EXAM:    CT Chest Without Intravenous Contrast    CLINICAL HISTORY:    81 years old, female; Injury or trauma; Fall; Initial encounter; Blunt trauma   (contusions or hematomas); Additional info: Fall, l side rib tenderness    TECHNIQUE:    Axial computed tomography images of the chest without intravenous contrast.    This CT exam was performed using one or more of the following dose reduction   techniques:  automated exposure control, adjustment of the mA and/or kV   according to patient size, and/or use of iterative reconstruction technique.    Coronal reformatted images were created and reviewed.    EXAM DATE/TIME:    7/15/2017 5:26 PM    COMPARISON:    No relevant prior studies available.    FINDINGS:    Lungs:  Mild bilateral predominantly dependent  atelectasis.  No focal   pulmonary consolidation.  Mild biapical scarring.  Minimal focus of tree in bud   nodularity/bronchiolitis in the medial right middle lobe.    Pleural space:  No pleural effusion or pneumothorax.    Heart:  Unremarkable.  No cardiomegaly.  No significant pericardial effusion.    Mediastinum:  Moderate hiatal hernia.    Thyroid:  Right much greater than left enlarged thyroid gland with   heterogeneity/nodularity, presumably thyroid goiter.    Bones/joints:  There are no displaced acute fractures identified.  There   appears to be a loose body within the posterior left shoulder joint.      Vasculature:  Unremarkable.  No thoracic aortic aneurysm.    Lymph nodes:  No appreciable lymphadenopathy.    Gallbladder and bile ducts:  Small calcified gallstone.  No biliary ductal   dilation.    Adrenals:  Left adrenal gland thickening.    Kidneys and ureters:  Bilateral renal cysts.      Impression:       1.  No acute traumatic intrathoracic abnormality identified.  2.  Minimal focus of tree in bud nodularity/bronchiolitis in the medial right   middle lobe, age-indeterminate.  3.  Remainder of findings as described above.      THIS DOCUMENT HAS BEEN ELECTRONICALLY SIGNED BY STEVENSON KRAUSE MD    CT Cervical Spine Without Contrast [[id number]]  (Abnormal) Collected:  07/15/17 1726     Updated:  07/15/17 1903    Narrative:       EXAM:    CT Cervical Spine Without Intravenous Contrast    CLINICAL HISTORY:    81 years old, female; Injury or trauma; Fall; Initial encounter; Blunt   trauma; Additional info: Fall, head injury    TECHNIQUE:    Axial computed tomography images of the cervical spine without intravenous   contrast.  This CT exam was performed using one or more of the following dose   reduction techniques:  automated exposure control, adjustment of the mA and/or   kV according to patient size, and/or use of iterative reconstruction technique.    Coronal and sagittal reformatted images were created and  reviewed.    EXAM DATE/TIME:    7/15/2017 5:26 PM    COMPARISON:    No relevant prior studies available.    FINDINGS:    Vertebrae:  Grade 1 anterolisthesis of C4 on C5 and of C7 on T1, most likely   on a degenerative basis.  The vertebral body heights are preserved.  There are   multilevel multifactorial degenerative changes including disc space narrowing,   endplate osteophyte formation, uncovertebral hypertrophy, and facet   arthropathy.  No evidence of acute cervical spine fracture.      Discs/spinal canal/neural foramina:  Degenerative changes result in up to   mild central canal and moderate neural foraminal narrowing.    Soft tissues:  The prevertebral soft tissues appear unremarkable.      Thyroid:  Partially visualized asymmetrically enlarged right thyroid gland   with heterogeneity/nodularity, presumably multinodular goiter.    Lung apices:  No apical pneumothorax.  Mild biapical scarring.    Other findings:  The bones appear osteopenic.      Impression:       1.  No evidence of acute cervical spine fracture.    2.  Remainder of findings as described above.      THIS DOCUMENT HAS BEEN ELECTRONICALLY SIGNED BY STEVENSON KRAUSE MD    CT Facial Bones Without Contrast [[id number]]  (Abnormal) Collected:  07/15/17 1726     Updated:  07/15/17 1921    Narrative:       EXAM:    CT Maxillofacial Without Intravenous Contrast    CLINICAL HISTORY:    81 years old, female; Injury or trauma; Fall; Initial encounter; Blunt trauma   (contusions or hematomas); Nose; Additional info: Head injury, nose and l cheek   and forehead swelling    TECHNIQUE:    Axial computed tomography images of the face without intravenous contrast.    This CT exam was performed using one or more of the following dose reduction   techniques:  automated exposure control, adjustment of the mA and/or kV   according to patient size, and/or use of iterative reconstruction technique.    Coronal and sagittal reformatted images were created and  reviewed.    EXAM DATE/TIME:    7/15/2017 5:26 PM    COMPARISON:    No relevant prior studies available.    FINDINGS:    Bones/joints:  There are bilateral nasal bone fractures.  There may be   nondisplaced fracture of the anterior superior aspect of the nasal septum.    There is most likely inferior aspect right anterior maxillary sinus wall   nondisplaced fracture with surrounding soft tissue air.  There is nondisplaced   fracture involving the inferior aspect anterolateral margin left maxillary   sinus wall with surrounding soft tissue air.  The temporomandibular joints   appear intact without evidence of dislocation.  The infraorbital walls appear   to be grossly intact.    Soft tissues:  There is paranasal as well as left of midline forehead and   frontal scalp soft tissue swelling/hematoma.    Orbits:  The patient is status post prior bilateral lens extraction.  The   globes otherwise appear intact.    Sinuses:  Moderate left maxillary sinus and mild right maxillary sinus fluid.    Thyroid:  Partially visualized asymmetrically enlarged right thyroid gland   with heterogeneity/nodularity, presumably multinodular goiter.      Impression:       1.  Bilateral nasal bone fractures.    2.  There may be nondisplaced fracture of the anterior superior aspect of the   nasal septum.    3. Most likely inferior aspect right anterior maxillary sinus wall nondisplaced   fracture with surrounding soft tissue air.    4.  Nondisplaced fracture involving the inferior aspect anterolateral margin   left maxillary sinus wall with surrounding soft tissue air.    5.  Moderate left maxillary sinus and mild right maxillary sinus fluid.    THIS DOCUMENT HAS BEEN ELECTRONICALLY SIGNED BY STEVENSON KRAUSE MD    CT Head Without Contrast [[id number]]  (Abnormal) Collected:  07/15/17 1726     Updated:  07/15/17 1929    Narrative:       EXAM:    CT Head Without Intravenous Contrast    CLINICAL HISTORY:    81 years old, female; Injury or trauma;  Fall; Initial encounter; Blunt trauma   (contusions or hematomas); Without loss of consciousness; Additional info:   Fall, frontal head injury    TECHNIQUE:    Axial computed tomography images of the head/brain without intravenous   contrast.  This CT exam was performed using one or more of the following dose   reduction techniques:  automated exposure control, adjustment of the mA and/or   kV according to patient size, and/or use of iterative reconstruction technique.    EXAM DATE/TIME:    7/15/2017 5:26 PM    COMPARISON:    No relevant prior studies available.    FINDINGS:    Brain:  There is suggestion of minimal subarachnoid hemorrhage in the right   temporal region, images 13-15 series 3.  No evidence of mass effect.  No   midline shift.  There is generalized cerebral atrophy.  There is mild   periventricular hypoattenuation, most consistent with chronic small vessel   ischemic changes.  No discrete CT evidence of acute/subacute transcortical   infarction.      Ventricles:  Unremarkable.  No ventriculomegaly.    Bones/joints:  No displaced skull fractures identified.  There are facial   bone fractures which are described on the dedicated CT face exam.    Soft tissues:  There is bilateral paranasal as well as left of midline   forehead and frontal scalp soft tissue swelling/hematoma.    Sinuses:  Left greater than right maxillary sinus fluid/blood.    Mastoid air cells:  Unremarkable as visualized.        Impression:       1.  Suggestion of minimal subarachnoid hemorrhage in the right temporal region.  2.  Remainder of findings as described above.  Please refer to the accompanying   separate CT face exam report for acute findings in this region.      Findings were discussed with Dr. Samuel Morris at 7:27 PM EDT on 7/15/2017.    THIS DOCUMENT HAS BEEN ELECTRONICALLY SIGNED BY STEVENSON KRAUSE MD        Cultures:  Urine Culture   Date Value Ref Range Status   07/15/2017 70,000-80,000 CFU/mL Normal Urogenital Danette   "Final     Comment:     Culture results show presence of more than 2 Isolates. This suggests contamination.  Repeat specimen is recommended.         Condition on Discharge:  Stable    Physical Exam on Discharge:/62  Pulse 69  Temp 98 °F (36.7 °C) (Oral)   Resp 18  Ht 65\" (165.1 cm)  Wt 175 lb 6.4 oz (79.6 kg)  SpO2 96%  BMI 29.19 kg/m2  Physical Exam    General Appearance: Alert, cooperative, no distress, up in chair  Head: Normocephalic, large ecchymosis around orbits bilaterally extending down both cheeks  Eyes:  Sclerae anicteric, No EOM  Neck:  Supple, no mass  Lungs: Clear to auscultation bilaterally, respirations unlabored on room air  Heart: Regular rate and rhythm, S1 and S2 normal, no murmur, rub or gallop  Abdomen:  Soft, non-tender, bowel sounds active, nondistended  Extremities: No clubbing, cyanosis, or edema to lower extremities  Skin: No rashes   Neurologic: Oriented x3, Normal strength to extremities       Discharge Disposition  Home-Health Care [facility]    Discharge Medications   Omayra Bhardwaj   Home Medication Instructions JENIFFER:[id number]    Printed on:07/21/17 1313   Medication Information                      acebutolol (SECTRAL) 200 MG capsule  TAKE (1) CAPSULE BY MOUTH DAILY.             atorvastatin (LIPITOR) 10 MG tablet  Take 1/2 tablet (dose = 5 mg) nightly             famotidine (PEPCID) 20 MG tablet  Take 20 mg by mouth 2 (Two) Times a Day.             lisinopril (PRINIVIL,ZESTRIL) 5 MG tablet  TAKE 1 TABLET DAILY.             nitroglycerin (NITROSTAT) 0.4 MG SL tablet  Place  under the tongue As Needed.             vitamin D (ERGOCALCIFEROL) 90124 UNITS capsule capsule  Take 50,000 Units by mouth 1 (One) Time Per Week. Monday                 Discharge Diet:   Diet Instructions     Diet: Soft Texture; Thin Liquids, No Restrictions; Ground       Discharge Diet:  Soft Texture   Fluid Consistency:  Thin Liquids, No Restrictions   Soft Options:  Ground                 Discharge " Care Plan / Instructions:    Activity at Discharge:   Activity Instructions     Activity as Tolerated                     Follow-up Appointments  Future Appointments  Date Time Provider Department Center   8/30/2017 11:45 AM Deniz Spence MD [facility] None     Additional Instructions for the Follow-ups that You Need to Schedule     Ambulatory Referral to Home Health    As directed    Face to Face Visit Date:  7/21/2017   Follow-up Provider for Plan of Care?:  I treated the patient in an acute care facility and will not continue treatment after discharge.   Follow-up Provider:  MONIK PEREIRA   Reason/Clinical Findings:  s/p SAH from a fall   Describe mobility limitations that make leaving home difficult:  Impaired fuctional mobility, balance, gait and endurance.   Nursing/Therapeutic Services Requested:   Skilled Nursing  Physical Therapy  Occupational Therapy      Skilled nursing orders:   Neurovascular assessments  Other      PT orders:   Therapeutic exercise  Gait Training  Transfer training  Home safety assessment      Weight Bearing Status:  As Tolerated   Occupational orders:   Activities of daily living  Energy conservation  Strengthening  Home safety assessment      Frequency:  Other       Discharge Follow-up with PCP    As directed    Follow Up Details:  1-2 weeks                 Test Results Pending at Discharge       KT Dumont 07/21/17 1:13 PM    Time: Discharge 45 min    Please note that portions of this note may have been completed with a voice recognition program. Efforts were made to edit the dictations, but occasionally words are mistranscribed.

## 2017-08-11 ENCOUNTER — OUTSIDE FACILITY SERVICE (OUTPATIENT)
Dept: HOSPITALIST | Facility: HOSPITAL | Age: 82
End: 2017-08-11

## 2017-08-11 PROCEDURE — G0180 MD CERTIFICATION HHA PATIENT: HCPCS | Performed by: INTERNAL MEDICINE

## 2017-08-30 ENCOUNTER — OFFICE VISIT (OUTPATIENT)
Dept: CARDIOLOGY | Facility: CLINIC | Age: 82
End: 2017-08-30

## 2017-08-30 VITALS
DIASTOLIC BLOOD PRESSURE: 58 MMHG | WEIGHT: 179.2 LBS | HEIGHT: 67 IN | BODY MASS INDEX: 28.12 KG/M2 | SYSTOLIC BLOOD PRESSURE: 115 MMHG | HEART RATE: 56 BPM

## 2017-08-30 DIAGNOSIS — I10 CHRONIC HYPERTENSION: ICD-10-CM

## 2017-08-30 DIAGNOSIS — I11.9 HYPERTENSIVE HEART DISEASE WITHOUT HEART FAILURE: Primary | ICD-10-CM

## 2017-08-30 DIAGNOSIS — E78.5 DYSLIPIDEMIA: ICD-10-CM

## 2017-08-30 PROCEDURE — 99213 OFFICE O/P EST LOW 20 MIN: CPT | Performed by: INTERNAL MEDICINE

## 2017-08-30 NOTE — PROGRESS NOTES
Subjective:     Encounter Date:08/30/2017      Patient ID: Omayra Bhardwaj is an 82 y.o.  white female, mental health/clinical , from [place], [place].      INTERNIST: Kitty Romero MD   ENDOCRINOLOGIST: Corbin Rossi MD     Chief Complaint:   Chief Complaint   Patient presents with   • Hypertensive heart disease     Problem List:  1. Probable hypertensive cardiovascular disease:  a. Longstanding hypertension with recent increased palpitations and echocardiogram demonstrating reduced LVEF (0.50) with mild TR with abnormal EKG revealing mild nonspecific T-wave changes, winter 2013.   b. Acceptable event recorder with continued medical therapy felt warranted.  c. Nuclear stress test showing a normal Lexiscan with LVEF (0.74), 05/28/2015.  d. Echocardiogram 7/17/17; LVEF 0.70, LV diastolic dysfunction grade 1 consistent with impaired relaxation, RV mildly dilated, normal RV wall thickness, systolic function and septal motion noted with RV cavity mildly dilated, no pericardial effusion, no pulmonary hypertension, no significant structural valvular abnormality demonstrated  e. Residual class I symptoms.   2. Dyslipidemia, untreated.  3. Chronic hypertension, probably essential.   4. History of obesity (BMI 29.5).  5. Former tobacco abuse.  6. Depression.  7. Chronic lymphedema with probable chronic lower extremity venous insufficiency with recent acceptable bilateral lower extremity venous duplex study, March 2013.  8. Thyroid nodule, workup pending.  9. Hypercalcemia/hyperparathyroidism, with contemplated parathyroidectomy (Dr. Davis, [facility]).  10. Recent disordered sleep with prominent snoring and occasional nocturnal hypersomnolence.   11. [facility] hospital admission for traumatic subarachnoid hemorrhage x7 days, July 2017, with urinary tract infection and thyroid nodule  12. Surgical history:  a. Tonsillectomy, 1942.  b. Appendectomy, 1945.  c. Bilateral  cataract removal, 2010.  d. Squamous cell skin cancer resection, 2010.  e. Laparoscopy, 1986.  No Known Allergies      Current Outpatient Prescriptions:   •  acebutolol (SECTRAL) 200 MG capsule, TAKE (1) CAPSULE BY MOUTH DAILY., Disp: 30 capsule, Rfl: 5  •  atorvastatin (LIPITOR) 10 MG tablet, Take 1/2 tablet (dose = 5 mg) nightly, Disp: , Rfl:   •  famotidine (PEPCID) 20 MG tablet, Take 20 mg by mouth 2 (Two) Times a Day., Disp: , Rfl:   •  lisinopril (PRINIVIL,ZESTRIL) 5 MG tablet, TAKE 1 TABLET DAILY., Disp: 30 tablet, Rfl: 6  •  nitroglycerin (NITROSTAT) 0.4 MG SL tablet, Place  under the tongue As Needed., Disp: , Rfl:   •  vitamin D (ERGOCALCIFEROL) 76097 UNITS capsule capsule, Take 50,000 Units by mouth 1 (One) Time Per Week. Monday, Disp: , Rfl:     History of Present Illness Patient returns for scheduled 6-month followup.  She denies any chest pressure, shortness of breath, palpitations, presyncope, or syncope.  She has completed her PT/OT at home and was excited because she graduated early.  She has a walker that she uses at home and a cane as well.  She has not had any more syncopal episodes.  She would like to start driving but her children feel that she does not need to be doing this.  Patient otherwise denies chest pain, shortness of breath, PND, edema, palpitations, syncope or presyncope at this time.  There is no apparent cardiac arrhythmia documented during recent hospitalization, and I remain perplexed by her syncopal episode in terms of an etiology.  She is aware of the need to be careful in terms of transfers and altered positions.      Review of Systems   HENT: Positive for headaches and tinnitus.    Cardiovascular: Positive for leg swelling and syncope.   Respiratory: Positive for snoring.    Skin: Positive for dry skin.   Gastrointestinal: Positive for heartburn.   Genitourinary: Positive for bladder incontinence.   Neurological: Positive for excessive daytime sleepiness.      Obtained and  "otherwise negative except as outlined in problem list and HPI.    Procedures       Objective:       Vitals:    08/30/17 1135 08/30/17 1137   BP: 120/70 115/58   BP Location: Left arm Left arm   Patient Position: Sitting Standing   Pulse: 56 56   Weight: 179 lb 3.2 oz (81.3 kg)    Height: 66.5\" (168.9 cm)      Body mass index is 28.49 kg/(m^2).   Last weight:  178 lbs.    Physical Exam   Constitutional: She is oriented to person, place, and time. She appears well-developed and well-nourished.   Neck: No JVD present. Carotid bruit is not present. No thyromegaly present.   Cardiovascular: Regular rhythm, S1 normal, S2 normal and normal heart sounds.  Exam reveals no gallop, no S3 and no friction rub.    No murmur heard.  Pulses:       Dorsalis pedis pulses are 1+ on the right side, and 1+ on the left side.        Posterior tibial pulses are 1+ on the right side, and 1+ on the left side.   Pulmonary/Chest: Effort normal and breath sounds normal. She has no wheezes. She has no rhonchi. She has no rales.   Abdominal: Soft. She exhibits no mass. There is no hepatosplenomegaly. There is no tenderness. There is no guarding.   Musculoskeletal: She exhibits edema (1+ bipedal).   Lymphadenopathy:     She has no cervical adenopathy.   Neurological: She is alert and oriented to person, place, and time.   Skin: Skin is warm, dry and intact. No rash noted.   Vitals reviewed.      Lab Review:   Lab Results   Component Value Date    GLUCOSE 104 (H) 07/18/2017    BUN 15 07/18/2017    CREATININE 0.80 07/18/2017    EGFRIFNONA 69 07/18/2017    BCR 18.8 07/18/2017    CO2 29.0 07/18/2017    CALCIUM 10.5 (H) 07/18/2017    ALBUMIN 4.00 07/15/2017    LABIL2 1.5 07/15/2017    AST 21 07/15/2017    ALT 12 07/15/2017       Lab Results   Component Value Date    WBC 5.04 07/18/2017    HGB 11.5 07/18/2017    HCT 36.1 07/18/2017    MCV 96.0 07/18/2017     07/18/2017       Lab Results   Component Value Date    HGBA1C 5.80 (H) 07/16/2017 "       Lab Results   Component Value Date    TSH 0.590 07/16/2017         Assessment:   Overall continued acceptable course with no interim cardiopulmonary complaints with good functional status. We will defer additional diagnostic or therapeutic intervention from a cardiac perspective at this time.We suggested that the patient not return to driving. We suggested she use knee-high LENNY compression stockings for leg swelling       Diagnosis Plan   1. Hypertensive heart disease without heart failure  Stable   2. Chronic hypertension  Controlled   3. Dyslipidemia  Controlled          Plan:         1. Patient to continue current medications and close follow up with the above providers.  2. Tentative cardiology follow up in 6 months or patient may return sooner PRN.  3. LENNY for swelling  4. No driving      Scribed for Deniz Spence MD by Lupe Nolan, APRN. 8/30/2017  1:22 PM    I, Deniz Spence MD, [facility], personally performed the services described in this documentation as scribed by the above named individual in my presence, and it is both accurate and complete. At 1:19 PM on 08/30/2017

## 2018-01-29 ENCOUNTER — OFFICE VISIT (OUTPATIENT)
Dept: NEUROLOGY | Facility: CLINIC | Age: 83
End: 2018-01-29

## 2018-01-29 VITALS
BODY MASS INDEX: 27.97 KG/M2 | HEIGHT: 66 IN | DIASTOLIC BLOOD PRESSURE: 58 MMHG | SYSTOLIC BLOOD PRESSURE: 116 MMHG | HEART RATE: 59 BPM | WEIGHT: 174 LBS

## 2018-01-29 DIAGNOSIS — R55 SYNCOPE, UNSPECIFIED SYNCOPE TYPE: Primary | ICD-10-CM

## 2018-01-29 PROCEDURE — 99215 OFFICE O/P EST HI 40 MIN: CPT | Performed by: PHYSICIAN ASSISTANT

## 2018-01-29 RX ORDER — TORSEMIDE 10 MG/1
10 TABLET ORAL DAILY
COMMUNITY
End: 2018-11-12 | Stop reason: SINTOL

## 2018-01-29 NOTE — PROGRESS NOTES
"Subjective     Chief Complaint: syncopal episode      History of Present Illness   Omayra Bhardwaj is a 82 y.o. female who comes to clinic today following a hospitalization at [facility] in 7/17. She passed out while in her bathroom at home after having a bowel movement and subsequently hit her head. She had associated loss of consciousness, though it is unclear for how long as this event was unfortunately unwitnessed. She and her family deny any significant fatigue or confusion after her fall as well as any additional associated neurological symptoms. An MRI of the brain was notable for a possible small traumatic subarachnoid hemorrhage in the right temporal region.     She may note some balance impairment, though denies any additional falls. She practices samir chi on a regular basis. She denies any associated tremor, shuffling gait, bradykinesia, or rigidity.     She and her family deny any significant cognitive impairment. She denies any ADL impairments.       I have reviewed and confirmed the past family, social and medical history as accurate on 1/29/18.     Review of Systems   Constitutional: Negative.    HENT: Negative.    Eyes: Negative.    Respiratory: Negative.    Cardiovascular: Negative.    Gastrointestinal: Negative.    Endocrine: Negative.    Genitourinary: Negative.    Musculoskeletal: Negative.    Skin: Negative.    Allergic/Immunologic: Negative.    Neurological:        As noted in HPI   Hematological: Negative.    Psychiatric/Behavioral: Negative.        Objective     /58  Pulse 59  Ht 168.9 cm (66.5\")  Wt 78.9 kg (174 lb)  BMI 27.67 kg/m2    General appearance today is normal.     Physical Exam   Neurological: She has normal strength. She has a normal Finger-Nose-Finger Test.   Reflex Scores:       Tricep reflexes are 1+ on the right side and 1+ on the left side.       Bicep reflexes are 1+ on the right side and 1+ on the left side.       Brachioradialis reflexes are 1+ on the right side and " 1+ on the left side.       Patellar reflexes are 1+ on the right side and 1+ on the left side.  Psychiatric: Her speech is normal.        Neurologic Exam     Mental Status   Speech: speech is normal   Level of consciousness: alert  Normal comprehension.     Cranial Nerves   Cranial nerves II through XII intact.     Motor Exam   Muscle bulk: normal  Overall muscle tone: normal    Strength   Strength 5/5 throughout.     Sensory Exam   Light touch normal.     Gait, Coordination, and Reflexes     Gait  Gait: (unsteady, antalgic)    Coordination   Finger to nose coordination: normal    Tremor   Resting tremor: absent    Reflexes   Right brachioradialis: 1+  Left brachioradialis: 1+  Right biceps: 1+  Left biceps: 1+  Right triceps: 1+  Left triceps: 1+  Right patellar: 1+  Left patellar: 1+          Assessment/Plan   Diagnoses and all orders for this visit:    Syncope, unspecified syncope type          Discussion/Summary   Omayra Bhardwaj comes to clinic today for a history suggestive of vasovagal syncope. Her neurological examination today is reassuringly normal. This was discussed at length with the patient and her family. Her workup has been complete and appropriate. Therefore, I do not have any further recommendations concerning this.She will then follow up in our clinic on an as needed basis.      I spent 35 minutes out of 45 minutes face to face with the patient and family and discussing diagnosis, prognosis, diagnostic testing, evaluation and current status.    As part of this visit I reviewed radiology results, reviewed radiology images, obtained additional history from the family which is incorporated in the HPI and reviewed records from prior hospitalizations which is incorporated in the HPI.      Stacey Longoria PA-C

## 2018-02-23 ENCOUNTER — TRANSCRIBE ORDERS (OUTPATIENT)
Dept: ADMINISTRATIVE | Facility: HOSPITAL | Age: 83
End: 2018-02-23

## 2018-02-23 DIAGNOSIS — Z12.31 VISIT FOR SCREENING MAMMOGRAM: Primary | ICD-10-CM

## 2018-02-27 RX ORDER — ACEBUTOLOL HYDROCHLORIDE 200 MG/1
CAPSULE ORAL
Qty: 90 CAPSULE | Refills: 0 | Status: SHIPPED | OUTPATIENT
Start: 2018-02-27 | End: 2018-03-29 | Stop reason: SDUPTHER

## 2018-03-02 ENCOUNTER — OFFICE VISIT (OUTPATIENT)
Dept: CARDIOLOGY | Facility: CLINIC | Age: 83
End: 2018-03-02

## 2018-03-02 VITALS
SYSTOLIC BLOOD PRESSURE: 119 MMHG | BODY MASS INDEX: 31.54 KG/M2 | HEIGHT: 63 IN | DIASTOLIC BLOOD PRESSURE: 61 MMHG | HEART RATE: 65 BPM | WEIGHT: 178 LBS

## 2018-03-02 DIAGNOSIS — E78.5 DYSLIPIDEMIA: ICD-10-CM

## 2018-03-02 DIAGNOSIS — I10 CHRONIC HYPERTENSION: ICD-10-CM

## 2018-03-02 DIAGNOSIS — I11.9 HYPERTENSIVE HEART DISEASE WITHOUT HEART FAILURE: Primary | ICD-10-CM

## 2018-03-02 PROCEDURE — 99214 OFFICE O/P EST MOD 30 MIN: CPT | Performed by: INTERNAL MEDICINE

## 2018-03-02 NOTE — PROGRESS NOTES
Subjective:     Encounter Date:03/02/2018    Patient ID: Omayra Bhardwaj is an 82 y.o.  white female, mental health/clinical , from [place], [place].      INTERNIST: Kitty Romero MD   ENDOCRINOLOGIST: Corbin Rossi MD    Chief Complaint:   Chief Complaint   Patient presents with   • Hypertension     Problem List:  1. Probable hypertensive cardiovascular disease:    A. Longstanding hypertension with recent increased palpitations and echocardiogram demonstrating reduced LVEF (0.50) with mild TR with abnormal EKG revealing mild nonspecific T-wave changes, winter 2013.    B. Acceptable event recorder with continued medical therapy felt warranted.   C. Nuclear stress test showing a normal Lexiscan with LVEF (0.74), 05/28/2015.   D. Echocardiogram 7/17/17; LVEF 0.70, LV diastolic dysfunction grade 1 consistent with impaired relaxation, RV mildly dilated, normal RV wall thickness, systolic function and septal motion noted with RV cavity mildly dilated, no pericardial effusion, no pulmonary hypertension, no significant structural valvular abnormality demonstrated   D. Residual class I symptoms.   2. Dyslipidemia, untreated.  3. Chronic hypertension, probably essential.   4. Mild obesity (BMI 31.5).  5. Former tobacco abuse.  6. Depression.  7. Chronic lymphedema with probable chronic lower extremity venous insufficiency with recent acceptable bilateral lower extremity venous duplex study, March 2013.  8. Thyroid nodule, workup pending.  9. Hypercalcemia/hyperparathyroidism, with contemplated parathyroidectomy (Dr. Davis, [facility]).  10. Recent disordered sleep with prominent snoring and occasional nocturnal hypersomnolence.   11. [facility] hospital admission for traumatic subarachnoid hemorrhage x7 days, July 2017, with urinary tract infection and thyroid nodule  12. Surgical history:   A. Tonsillectomy, 1942.   B. Appendectomy, 1945.   C. Bilateral cataract  removal, 2010.   D. Squamous cell skin cancer resection, 2010.   E. Laparoscopy, 1986.    No Known Allergies      Current Outpatient Prescriptions:   •  acebutolol (SECTRAL) 200 MG capsule, TAKE (1) CAPSULE BY MOUTH DAILY., Disp: 90 capsule, Rfl: 0  •  atorvastatin (LIPITOR) 10 MG tablet, Take 1/2 tablet (dose = 5 mg) nightly, Disp: , Rfl:   •  B Complex Vitamins (VITAMIN B COMPLEX PO), Take  by mouth Daily., Disp: , Rfl:   •  famotidine (PEPCID) 20 MG tablet, Take 20 mg by mouth 2 (Two) Times a Day., Disp: , Rfl:   •  lisinopril (PRINIVIL,ZESTRIL) 5 MG tablet, TAKE 1 TABLET DAILY., Disp: 30 tablet, Rfl: 6  •  nitroglycerin (NITROSTAT) 0.4 MG SL tablet, Place  under the tongue As Needed., Disp: , Rfl:   •  torsemide (DEMADEX) 10 MG tablet, Take 10 mg by mouth Daily., Disp: , Rfl:   •  vitamin D (ERGOCALCIFEROL) 59644 UNITS capsule capsule, Take 50,000 Units by mouth 1 (One) Time Per Week. Monday, Disp: , Rfl:     HISTORY OF PRESENT ILLNESS:  Patient is here for a 6 month follow-up. She denies syncopal episodes, presyncope, CP, palpitations, edema, or increased SOB. She is now living at [facility] and does [employer] and other activities there that she enjoys.  She is still getting speech therapy and is hopeful that her speech will continue to improve.  She is no longer doing physical therapy.  She has a boyfriend that has a pacemaker.  When her physical therapist has checked her blood pressure, it has been similar to today's results.  Patient otherwise denies chest pain, shortness of breath, PND, edema, palpitations, syncope or presyncope at this time.  She uses a quad cane to ambulate but states she is stable and has had no falls.      Review of Systems   HENT: Positive for tinnitus.    Respiratory: Positive for snoring.    Skin: Positive for dry skin.   Musculoskeletal: Positive for arthritis.   Genitourinary: Positive for bladder incontinence.      Obtained and otherwise negative except as outlined in problem  "list and HPI.    Procedures       Objective:       Vitals:    03/02/18 1406 03/02/18 1411   BP: 123/70 119/61   BP Location: Right arm Right arm   Patient Position: Sitting Standing   Pulse: 62 65   Weight: 80.7 kg (178 lb)    Height: 160 cm (63\")      Body mass index is 31.53 kg/(m^2).  Last weight August 2017 was 179 pounds    Physical Exam   Constitutional: She is oriented to person, place, and time. She appears well-developed and well-nourished.   Neck: No JVD present. Carotid bruit is not present. No thyromegaly present.   Cardiovascular: Regular rhythm, S1 normal and S2 normal.  Exam reveals no gallop, no S3 and no friction rub.    Murmur heard.   Medium-pitched harsh early systolic murmur is present with a grade of 1/6  at the upper right sternal border  Pulses:       Dorsalis pedis pulses are 1+ on the right side, and 1+ on the left side.        Posterior tibial pulses are 1+ on the right side, and 1+ on the left side.   Pulmonary/Chest: Effort normal and breath sounds normal. She has no wheezes. She has no rhonchi. She has no rales.   Abdominal: Soft. She exhibits no mass. There is no hepatosplenomegaly. There is no tenderness. There is no guarding.   Bowel sounds audible x4   Musculoskeletal: Normal range of motion. She exhibits edema (1+).   Lymphadenopathy:     She has no cervical adenopathy.   Neurological: She is alert and oriented to person, place, and time.   Skin: Skin is warm, dry and intact. No rash noted.   Vitals reviewed.        Lab Review:   Lab Results   Component Value Date    GLUCOSE 104 (H) 07/18/2017    BUN 15 07/18/2017    CREATININE 0.80 07/18/2017    EGFRIFNONA 69 07/18/2017    BCR 18.8 07/18/2017    CO2 29.0 07/18/2017    CALCIUM 10.5 (H) 07/18/2017    ALBUMIN 4.00 07/15/2017    LABIL2 1.5 07/15/2017    AST 21 07/15/2017    ALT 12 07/15/2017       Lab Results   Component Value Date    WBC 5.04 07/18/2017    HGB 11.5 07/18/2017    HCT 36.1 07/18/2017    MCV 96.0 07/18/2017     " 07/18/2017       Lab Results   Component Value Date    HGBA1C 5.80 (H) 07/16/2017       Lab Results   Component Value Date    TSH 0.590 07/16/2017   · 9/13/17 per physician letter:  · Hemoglobin A1c 5.5%  · Vitamin B12 298  · Normal TSH, vitamin D 23.7,  · CMP: creatinine 0.8, BUN 22, , serum calcium 11, normal liver function tests, albumin 4.2, Lipid panel: cholesterol 199, HDL 71, triglycerides 117,   · CBC: Hematocrit 39%, hemoglobin 12.4, WBC 5.4, normal indices, platelet count, and differential        Assessment:   Overall continued acceptable course with no interim cardiopulmonary complaints with acceptable functional status. We will defer additional diagnostic or therapeutic intervention from a cardiac perspective at this time.     Diagnosis Plan   1. Hypertensive heart disease without heart failure  Stable; no angina or CHF   2. Chronic hypertension  Controlled   3. Dyslipidemia  No new labs          Plan:         1. Patient to continue current medications and close follow up with the above providers.  2. Tentative cardiology follow up in September 2018, or patient may return sooner PRN.    Scribed for Deniz Spence MD by Lupe Nolan, APRN. 3/2/2018  2:21 PM    I, Deniz Spence MD, [facility], personally performed the services described in this documentation as scribed by the above named individual in my presence, and it is both accurate and complete. At 2:35 PM on 03/02/2018

## 2018-03-27 ENCOUNTER — HOSPITAL ENCOUNTER (OUTPATIENT)
Dept: MAMMOGRAPHY | Facility: HOSPITAL | Age: 83
Discharge: HOME | End: 2018-03-27
Attending: INTERNAL MEDICINE | Admitting: INTERNAL MEDICINE

## 2018-03-27 DIAGNOSIS — Z12.31 VISIT FOR SCREENING MAMMOGRAM: ICD-10-CM

## 2018-03-27 PROCEDURE — 77067 SCR MAMMO BI INCL CAD: CPT | Performed by: RADIOLOGY

## 2018-03-27 PROCEDURE — 77063 BREAST TOMOSYNTHESIS BI: CPT

## 2018-03-27 PROCEDURE — 77063 BREAST TOMOSYNTHESIS BI: CPT | Performed by: RADIOLOGY

## 2018-03-27 PROCEDURE — 77067 SCR MAMMO BI INCL CAD: CPT

## 2018-03-29 RX ORDER — ACEBUTOLOL HYDROCHLORIDE 200 MG/1
CAPSULE ORAL
Qty: 30 CAPSULE | Refills: 5 | Status: SHIPPED | OUTPATIENT
Start: 2018-03-29 | End: 2018-09-05 | Stop reason: SDUPTHER

## 2018-04-11 ENCOUNTER — TRANSCRIBE ORDERS (OUTPATIENT)
Dept: MAMMOGRAPHY | Facility: HOSPITAL | Age: 83
End: 2018-04-11

## 2018-04-11 ENCOUNTER — HOSPITAL ENCOUNTER (OUTPATIENT)
Dept: ULTRASOUND IMAGING | Facility: HOSPITAL | Age: 83
Discharge: HOME | End: 2018-04-11

## 2018-04-11 ENCOUNTER — HOSPITAL ENCOUNTER (OUTPATIENT)
Dept: MAMMOGRAPHY | Facility: HOSPITAL | Age: 83
Discharge: HOME | End: 2018-04-11
Admitting: INTERNAL MEDICINE

## 2018-04-11 DIAGNOSIS — R92.8 ABNORMAL MAMMOGRAM: ICD-10-CM

## 2018-04-11 DIAGNOSIS — R92.8 ABNORMAL MAMMOGRAM: Primary | ICD-10-CM

## 2018-04-11 PROCEDURE — 76642 ULTRASOUND BREAST LIMITED: CPT

## 2018-04-11 PROCEDURE — 77065 DX MAMMO INCL CAD UNI: CPT | Performed by: RADIOLOGY

## 2018-04-11 PROCEDURE — G0279 TOMOSYNTHESIS, MAMMO: HCPCS | Performed by: RADIOLOGY

## 2018-04-11 PROCEDURE — G0279 TOMOSYNTHESIS, MAMMO: HCPCS

## 2018-04-11 PROCEDURE — 76642 ULTRASOUND BREAST LIMITED: CPT | Performed by: RADIOLOGY

## 2018-04-11 PROCEDURE — 77065 DX MAMMO INCL CAD UNI: CPT

## 2018-04-17 ENCOUNTER — HOSPITAL ENCOUNTER (OUTPATIENT)
Dept: ULTRASOUND IMAGING | Facility: HOSPITAL | Age: 83
Discharge: HOME | End: 2018-04-17
Admitting: INTERNAL MEDICINE

## 2018-04-17 ENCOUNTER — HOSPITAL ENCOUNTER (OUTPATIENT)
Dept: MAMMOGRAPHY | Facility: HOSPITAL | Age: 83
Discharge: HOME | End: 2018-04-17

## 2018-04-17 DIAGNOSIS — R92.8 ABNORMAL MAMMOGRAM: ICD-10-CM

## 2018-04-17 PROCEDURE — A4648 IMPLANTABLE TISSUE MARKER: HCPCS

## 2018-04-17 PROCEDURE — 77065 DX MAMMO INCL CAD UNI: CPT | Performed by: RADIOLOGY

## 2018-04-17 PROCEDURE — 88305 TISSUE EXAM BY PATHOLOGIST: CPT | Performed by: INTERNAL MEDICINE

## 2018-04-17 PROCEDURE — 19083 BX BREAST 1ST LESION US IMAG: CPT | Performed by: RADIOLOGY

## 2018-04-17 RX ORDER — LIDOCAINE HYDROCHLORIDE 10 MG/ML
5 INJECTION, SOLUTION INFILTRATION; PERINEURAL ONCE
Status: COMPLETED | OUTPATIENT
Start: 2018-04-17 | End: 2018-04-17

## 2018-04-17 RX ORDER — LIDOCAINE HYDROCHLORIDE AND EPINEPHRINE 10; 10 MG/ML; UG/ML
10 INJECTION, SOLUTION INFILTRATION; PERINEURAL ONCE
Status: COMPLETED | OUTPATIENT
Start: 2018-04-17 | End: 2018-04-17

## 2018-04-17 RX ADMIN — LIDOCAINE HYDROCHLORIDE 1 ML: 10 INJECTION, SOLUTION INFILTRATION; PERINEURAL at 14:49

## 2018-04-17 RX ADMIN — LIDOCAINE HYDROCHLORIDE,EPINEPHRINE BITARTRATE 1 ML: 10; .01 INJECTION, SOLUTION INFILTRATION; PERINEURAL at 14:50

## 2018-04-19 LAB
CYTO UR: NORMAL
LAB AP CASE REPORT: NORMAL
LAB AP CLINICAL INFORMATION: NORMAL
LAB AP DIAGNOSIS COMMENT: NORMAL
Lab: NORMAL
PATH REPORT.FINAL DX SPEC: NORMAL
PATH REPORT.GROSS SPEC: NORMAL

## 2018-04-23 ENCOUNTER — TELEPHONE (OUTPATIENT)
Dept: MAMMOGRAPHY | Facility: HOSPITAL | Age: 83
End: 2018-04-23

## 2018-04-23 NOTE — TELEPHONE ENCOUNTER
At pt's request, pt's daughter notified of pathology result & recommendations. Questions answered, support given. Pt of family member will let me know which surgeon pt would like to see. Reviewed what would be discussed at surgical consult visit, including detailed explanation of pathology report & imaging reports; surgical options, pros & cons.. Encouraged pt's daughter to call back with further questions or concerns. Patient's daughter verbalized understanding.

## 2018-05-22 ENCOUNTER — TRANSCRIBE ORDERS (OUTPATIENT)
Dept: MAMMOGRAPHY | Facility: HOSPITAL | Age: 83
End: 2018-05-22

## 2018-05-22 DIAGNOSIS — N63.24 MASS OF LOWER INNER QUADRANT OF LEFT BREAST: Primary | ICD-10-CM

## 2018-08-30 ENCOUNTER — TRANSCRIBE ORDERS (OUTPATIENT)
Dept: ADMINISTRATIVE | Facility: HOSPITAL | Age: 83
End: 2018-08-30

## 2018-08-30 DIAGNOSIS — I69.391 DYSPHAGIA DUE TO RECENT CEREBROVASCULAR ACCIDENT: Primary | ICD-10-CM

## 2018-09-04 ENCOUNTER — HOSPITAL ENCOUNTER (OUTPATIENT)
Dept: GENERAL RADIOLOGY | Facility: HOSPITAL | Age: 83
Discharge: HOME | End: 2018-09-04
Attending: INTERNAL MEDICINE | Admitting: INTERNAL MEDICINE

## 2018-09-04 DIAGNOSIS — I69.391 DYSPHAGIA DUE TO RECENT CEREBROVASCULAR ACCIDENT: Primary | ICD-10-CM

## 2018-09-04 PROCEDURE — G8996 SWALLOW CURRENT STATUS: HCPCS

## 2018-09-04 PROCEDURE — 63710000001 BARIUM SULFATE 40 % SUSPENSION: Performed by: INTERNAL MEDICINE

## 2018-09-04 PROCEDURE — 92611 MOTION FLUOROSCOPY/SWALLOW: CPT

## 2018-09-04 PROCEDURE — G8998 SWALLOW D/C STATUS: HCPCS

## 2018-09-04 PROCEDURE — G8997 SWALLOW GOAL STATUS: HCPCS

## 2018-09-04 PROCEDURE — A9270 NON-COVERED ITEM OR SERVICE: HCPCS | Performed by: INTERNAL MEDICINE

## 2018-09-04 PROCEDURE — 63710000001 BARIUM SULFATE 40 % PASTE: Performed by: INTERNAL MEDICINE

## 2018-09-04 PROCEDURE — 63710000001 BARIUM SULFATE 40 % RECONSTITUTED SUSPENSION: Performed by: INTERNAL MEDICINE

## 2018-09-04 PROCEDURE — 74230 X-RAY XM SWLNG FUNCJ C+: CPT

## 2018-09-04 RX ADMIN — BARIUM SULFATE 50 ML: 400 SUSPENSION ORAL at 11:17

## 2018-09-04 RX ADMIN — BARIUM SULFATE 20 ML: 400 PASTE ORAL at 11:18

## 2018-09-04 RX ADMIN — BARIUM SULFATE 100 ML: 0.81 POWDER, FOR SUSPENSION ORAL at 11:18

## 2018-09-04 NOTE — MBS/VFSS/FEES
Acute Care - Speech Language Pathology   Swallow Initial Evaluation [facility]     Patient Name: Omayra Bhardwaj  : 1935  MRN: [id number]  Today's Date: 2018               Admit Date: 2018    Visit Dx:     ICD-10-CM ICD-9-CM   1. Dysphagia due to recent cerebrovascular accident I69.391 438.82     Patient Active Problem List   Diagnosis   • Hypertensive cardiovascular disease   • Dyslipidemia   • Chronic hypertension   • History of obesity   • Depression   • Chronic lymphedema   • Thyroid nodule   • Hyperparathyroidism (CMS/HCC)   • Disordered sleep   • GERD (gastroesophageal reflux disease)   • Traumatic SAH (subarachnoid hemorrhage)   • Subarachnoid hemorrhage, traumatic (CMS/HCC)   • UTI (urinary tract infection)   • Syncope     Past Medical History:   Diagnosis Date   • Chronic hypertension     probably essential.     • Chronic lymphedema     with probable chronic lower extremity venous insufficiency with recent acceptable bilateral lower extremity venous duplex study, 2013.   • Depression    • Disordered sleep     with prominent snoring and occasional nocturnal hypersomnolence.    • Dyslipidemia      untreated.   • Former tobacco use    • GERD (gastroesophageal reflux disease)    • History of obesity     (BMI 29.5).   • Hypercalcemia    • Hyperlipidemia    • Hyperparathyroidism (CMS/HCC)     with contemplated parathyroidectomy (Dr. Davis, [facility]).   • Hypertensive cardiovascular disease     probable   • Thyroid nodule     workup pending.     Past Surgical History:   Procedure Laterality Date   • APPENDECTOMY     • BREAST BIOPSY Left    • BREAST EXCISIONAL BIOPSY Left    • CATARACT EXTRACTION      bilateral    • OTHER SURGICAL HISTORY      Laparoscopy   • PARATHYROIDECTOMY     • SQUAMOUS CELL CARCINOMA EXCISION     • TONSILLECTOMY            SWALLOW EVALUATION (last 72 hours)      SLP Adult Swallow Evaluation     Row Name 18  1130                   Rehab Evaluation    Document Type evaluation  -SG        Subjective Information no complaints  -SG        Patient Observations alert;cooperative  -SG        Patient Effort good  -SG        Symptoms Noted During/After Treatment none  -SG           General Information    Patient Profile Reviewed yes  -SG        Pertinent History Of Current Problem adm w/ c/o progressive worsening of speech and swallowing skills. H/o Traumatic SAH, GERD, hypersalivation, gets ST as an OP at her assisted living home  -SG        Current Method of Nutrition regular textures;thin liquids  -SG        Precautions/Limitations, Vision WFL;for purposes of eval  -SG        Precautions/Limitations, Hearing WFL;for purposes of eval  -SG        Prior Level of Function-Communication motor speech impairment;other (see comments)   ST services   -SG        Prior Level of Function-Swallowing no diet consistency restrictions;safe, efficient swallowing in all situations  -SG        Plans/Goals Discussed with patient and family;agreed upon   dtr present for MBS  -SG        Barriers to Rehab none identified  -SG        Patient's Goals for Discharge eat/drink without coughing/choking  -SG        Family Goals for Discharge patient able to eat/drink without coughing/choking  -SG           Pain Assessment    Additional Documentation Pain Scale: FACES Pre/Post-Treatment (Group)  -SG           Pain Scale: FACES Pre/Post-Treatment    Pain: FACES Scale, Pretreatment 0-->no hurt  -SG        Pain: FACES Scale, Post-Treatment 0-->no hurt  -SG           Oral Motor and Function    Dentition Assessment upper dentures/partial in place;lower dentures/partial in place  -SG        Secretion Management other (see comments)   hypersalivation  -SG        Mucosal Quality moist, healthy  -SG        Gag Response WFL  -SG        Volitional Swallow weak  -SG        Volitional Cough weak  -SG           Oral Musculature and Cranial Nerve Assessment    Oral Motor  General Assessment oral labial or buccal impairment  -SG        Oral Labial or Buccal Impairment, Detail, Cranial Nerve VII (Facial): reduced strength bilaterally;reduced ROM   generalized weakness  -SG           MBS/VFSS    Utensils Used spoon;cup;straw  -SG           MBS/VFSS Interpretation    Oral Prep Phase impaired oral phase of swallowing  -SG        Oral Transit Phase impaired  -SG        Oral Residue impaired  -SG        VFSS Summary MBS completed this date. Pt and dtr present for MBS. Pt presented w/ moderate oropharyngeal dysphagia on this exam. Dtr reports progressive worsening of swallowing and speech skills over the past few months. Appt w/ Neurology has been scheduled for next week. Hypersalivation also noted by pt/dtr and pt is becoming self-conscious about her feeding difficulties.     MBS: Deep penetration noted during the swallow w/ thins (and NT w/ large, uncontrolled bolus) via tsp, cup and straw. Penetration was deep and did not clear w/ the completion of the swallow. This posed an aspiration risk. Pt and dtr report frequent coughing w/ liquids at home. Suspect aspiration from this laryngeal vestibule residue. No penetration or aspiration w/ Puree, solids, or NT w/ tsp size bolus. Dec'd oral strength and control impacting oral prep/transit and resulted in some ant loss of bolus w/ thins. Pt c/o significant fatigue w/ solid foods, does best w/ soft/puree Rec: Pureed foods, Nectar Thick Liquids, Neurology consult pending (no dysphagia strengthening exercises recommended until the neuro work-up is completed, pt to f/u w/ OP SLP for further rec's after neuro consult), extensive education/info provided on thickener guidelines, dysphagia, aspiration precautions, etc... Contact info also provided to the pt and dtr if any needs/questions arive. Re-eval recommended if any changes in pt's status. Thank you for the consult, SLP  -SG           Oral Preparatory Phase    Oral Preparatory Phase reduced lip  closure around utensil;prolonged manipulation  -SG        Reduced Lip Closure Around Utensil thin liquids;nectar-thick liquids  -SG        Prolonged Manipulation regular textures  -SG           Oral Transit Phase    Impaired Oral Transit Phase delayed initiation of bolus transit;increased A-P transit time  -SG        Delayed Initiation of bolus transit pudding/puree;regular textures  -SG        Increased A-P Transit Time pudding/puree;regular textures  -SG           Oral Residue    Impaired Oral Residue lateral sulci residue;diffuse residue throughout oral cavity  -SG           Initiation of Pharyngeal Swallow    Initiation of Pharyngeal Swallow bolus in pyriform sinuses  -SG        Pharyngeal Phase impaired pharyngeal phase of swallowing  -SG        Penetration During the Swallow thin liquids;nectar-thick liquids;secondary to delayed swallow initiation or mistiming;secondary to reduced vestibular closure;other (see comments)   only w/ large bolus of NT  -SG        Response to Penetration no response  -SG        Rosenbek's Scale 3--->level 3  -SG        Attempted Compensatory Maneuvers bolus size;other (see comments)   tsp size bolus  -SG        Response to Attempted Compensatory Maneuvers prevented aspiration  -SG           Clinical Impression    SLP Swallowing Diagnosis moderate;oral dysfunction;pharyngeal dysfunction  -SG        Functional Impact risk of aspiration/pneumonia  -SG        Rehab Potential/Prognosis, Swallowing good, to achieve stated therapy goals  -SG        Swallow Criteria for Skilled Therapeutic Interventions Met demonstrates skilled criteria  -SG           Recommendations    Therapy Frequency (Swallow) PRN;other (see comments)   pending neuro consult  -SG        SLP Diet Recommendation puree;nectar thick liquids;other (see comments)   tsp size bolus only  -SG        Recommended Diagnostics reassess via VFSS (MBS)  -SG        Recommended Precautions and Strategies upright posture during/after  eating;small bites of food and sips of liquid  -SG        SLP Rec. for Method of Medication Administration meds crushed;with pudding or applesauce  -SG        Monitor for Signs of Aspiration yes;notify SLP if any concerns  -SG        Anticipated Dischage Disposition home with OP services   SLP left voicemain for pt's OP SLP, Dominic Micheal [phone]  -          User Key  (r) = Recorded By, (t) = Taken By, (c) = Cosigned By    Initials Name Effective Dates    Leonor Barbosa MS CCC-SLP 06/22/15 -         EDUCATION  The patient has been educated in the following areas:   Dysphagia (Swallowing Impairment) Oral Care/Hydration Modified Diet Instruction.    SLP Recommendation and Plan  SLP Swallowing Diagnosis: moderate, oral dysfunction, pharyngeal dysfunction  SLP Diet Recommendation: puree, nectar thick liquids, other (see comments) (tsp size bolus only)  Recommended Precautions and Strategies: upright posture during/after eating, small bites of food and sips of liquid     Monitor for Signs of Aspiration: yes, notify SLP if any concerns  Recommended Diagnostics: reassess via VFSS ([facility])  Swallow Criteria for Skilled Therapeutic Interventions Met: demonstrates skilled criteria  Anticipated Dischage Disposition: home with OP services (SLP left voicemain for pt's OP SLP, Dominic Burton [phone])  Rehab Potential/Prognosis, Swallowing: good, to achieve stated therapy goals  Therapy Frequency (Swallow): PRN, other (see comments) (pending neuro consult)                           Time Calculation:         Time Calculation- SLP     Row Name 09/04/18 1504             Time Calculation- SLP    SLP Start Time 1130  -SG      SLP Received On 09/04/18  -        User Key  (r) = Recorded By, (t) = Taken By, (c) = Cosigned By    Initials Name Provider Type    Leonor Barbosa MS CCC-SLP Speech and Language Pathologist          Therapy Charges for Today     Code Description Service Date Service Provider  Modifiers Qty    19833484884 HC ST MOTION FLUORO EVAL SWALLOW 8 9/4/2018 Basil Gillah Katrin, MS CCC-SLP GN 1    [id number] HC ST SWALLOWING CURRENT STATUS 9/4/2018 Leonor Gill, MS CCC-SLP GN, CK 1    [id number] HC ST SWALLOWING PROJECTED 9/4/2018 Leonor Gill, MS CCC-SLP GN, CJ 1    [id number] HC ST SWALLOWING DISCHARGE 9/4/2018 Leonor Gill, MS MIRANDA-SLP GN, CK 1          SLP G-Codes  Functional Limitations: Swallowing  Swallow Current Status (): At least 40 percent but less than 60 percent impaired, limited or restricted  Swallow Goal Status (): At least 20 percent but less than 40 percent impaired, limited or restricted  Swallow Discharge Status (): At least 40 percent but less than 60 percent impaired, limited or restricted    Leonor Gill MS CCC-SLP  9/4/2018

## 2018-09-05 ENCOUNTER — OFFICE VISIT (OUTPATIENT)
Dept: CARDIOLOGY | Facility: CLINIC | Age: 83
End: 2018-09-05

## 2018-09-05 VITALS
BODY MASS INDEX: 27.97 KG/M2 | SYSTOLIC BLOOD PRESSURE: 122 MMHG | DIASTOLIC BLOOD PRESSURE: 74 MMHG | HEART RATE: 61 BPM | WEIGHT: 174 LBS | HEIGHT: 66 IN

## 2018-09-05 DIAGNOSIS — I11.9 HYPERTENSIVE HEART DISEASE WITHOUT HEART FAILURE: Primary | ICD-10-CM

## 2018-09-05 DIAGNOSIS — I10 CHRONIC HYPERTENSION: ICD-10-CM

## 2018-09-05 DIAGNOSIS — E78.5 DYSLIPIDEMIA: ICD-10-CM

## 2018-09-05 PROCEDURE — 99214 OFFICE O/P EST MOD 30 MIN: CPT | Performed by: INTERNAL MEDICINE

## 2018-09-05 RX ORDER — ACEBUTOLOL HYDROCHLORIDE 200 MG/1
200 CAPSULE ORAL DAILY
Qty: 30 CAPSULE | Refills: 11 | Status: SHIPPED | OUTPATIENT
Start: 2018-09-05 | End: 2018-10-06 | Stop reason: SDUPTHER

## 2018-09-05 NOTE — PROGRESS NOTES
Subjective:     Encounter Date:09/05/2018    Patient ID: Omayra Bhardwaj is an 83 y.o.  white female, mental health/clinical , from [place], [place].      INTERNIST: Kitty Romero MD   ENDOCRINOLOGIST: Corbin Rossi MD    Chief Complaint:   Chief Complaint   Patient presents with   • Hypertensive cardiovascular disease     Problem List:  1. Probable hypertensive cardiovascular disease:               A. Longstanding hypertension with recent increased palpitations and echocardiogram demonstrating reduced LVEF (0.50) with mild TR with abnormal EKG revealing mild nonspecific T-wave changes, winter 2013.               B. Acceptable event recorder with continued medical therapy felt warranted.              C. Nuclear stress test showing a normal Lexiscan with LVEF (0.74), 05/28/2015.              D. Echocardiogram 7/17/17; LVEF 0.70, LV diastolic dysfunction grade 1 consistent with impaired relaxation, RV mildly dilated, normal RV wall thickness, systolic function and septal motion noted with RV cavity mildly dilated, no pericardial effusion, no pulmonary hypertension, no significant structural valvular abnormality demonstrated              D. Residual class I symptoms.   2. Dyslipidemia, untreated.  3. Chronic hypertension, probably essential.   4. Mild obesity (BMI 31.5); resolved, BMI now 28.1.  5. Former tobacco abuse.  6. Depression.  7. Chronic lymphedema with probable chronic lower extremity venous insufficiency with recent acceptable bilateral lower extremity venous duplex study, March 2013.  8. Thyroid nodule, workup pending.  9. Hypercalcemia/hyperparathyroidism, with contemplated parathyroidectomy (Dr. Davis, [facility]).  10. Recent disordered sleep with prominent snoring and occasional nocturnal hypersomnolence.   11. [facility] hospital admission for traumatic subarachnoid hemorrhage x7 days, July 2017, with urinary tract infection and thyroid nodule  "with subsequent moderate speech impairment  12. Surgical history:              A. Tonsillectomy, 1942.              B. Appendectomy, 1945.              C. Bilateral cataract removal, .              D. Squamous cell skin cancer resection, .              E. Laparoscopy, .     No Known Allergies      Current Outpatient Prescriptions:   •  acebutolol (SECTRAL) 200 MG capsule, TAKE (1) CAPSULE BY MOUTH DAILY., Disp: 30 capsule, Rfl: 5  •  atorvastatin (LIPITOR) 10 MG tablet, Take 1/2 tablet (dose = 5 mg) nightly, Disp: , Rfl:   •  B Complex Vitamins (VITAMIN B COMPLEX PO), Take  by mouth Daily., Disp: , Rfl:   •  famotidine (PEPCID) 20 MG tablet, Take 20 mg by mouth 2 (Two) Times a Day., Disp: , Rfl:   •  lisinopril (PRINIVIL,ZESTRIL) 5 MG tablet, TAKE 1 TABLET DAILY., Disp: 30 tablet, Rfl: 6  •  nitroglycerin (NITROSTAT) 0.4 MG SL tablet, Place  under the tongue As Needed., Disp: , Rfl:   •  torsemide (DEMADEX) 10 MG tablet, Take 10 mg by mouth Daily., Disp: , Rfl:   •  vitamin D (ERGOCALCIFEROL) 52182 UNITS capsule, Take 50,000 Units by mouth 1 (One) Time Per Week. Monday, Disp: , Rfl:     HISTORY OF PRESENT ILLNESS: Patient returns for scheduled 6-month followup. She says that she has not been hospitalized for herself since last seen in our office, but she is \"heartbroken\" because she lost her son on 2018.  He  in his sleep but had been in the hospital a few weeks before and had been in and out of the hospital.  He had chronic alcoholism and heavy tobacco use by her report.  Through all of the stress, she did not have any heart problems; no pain, tightness, or pressure.  She says that they took her blood pressure at [facility], but they took it in her wrist, and her systolic reading was 150.  Patient otherwise denies chest pain, shortness of breath, PND, edema, palpitations, syncope or presyncope at this time.        ROS   Obtained and otherwise negative except as outlined in problem list and " "HPI.    Procedures       Objective:       Vitals:    09/05/18 1434 09/05/18 1437 09/05/18 1449   BP: 118/60 (!) 87/55 122/74   BP Location: Left arm Left arm Right arm   Patient Position: Sitting Standing Sitting   Pulse: 57 61    Weight: 78.9 kg (174 lb)     Height: 167.6 cm (66\")       Body mass index is 28.08 kg/m².   Last weight:  178 lbs.    Physical Exam   Constitutional: She is oriented to person, place, and time. She appears well-developed and well-nourished.   Neck: No JVD present. Carotid bruit is not present. No thyromegaly present.   Cardiovascular: Regular rhythm, S1 normal and S2 normal.  Exam reveals no gallop, no S3 and no friction rub.    Murmur heard.   Medium-pitched early systolic murmur is present with a grade of 2/6  at the lower left sternal border  Pulses:       Carotid pulses are 1+ on the right side, and 1+ on the left side.       Radial pulses are 1+ on the right side, and 1+ on the left side.        Femoral pulses are 1+ on the right side, and 1+ on the left side.       Popliteal pulses are 1+ on the right side, and 1+ on the left side.        Dorsalis pedis pulses are 1+ on the right side, and 1+ on the left side.        Posterior tibial pulses are 1+ on the right side, and 1+ on the left side.   Pulmonary/Chest: Effort normal and breath sounds normal. She has no wheezes. She has no rhonchi. She has no rales.   Abdominal: Soft. She exhibits no mass. There is no hepatosplenomegaly. There is no tenderness. There is no guarding.   Bowel sounds audible x4   Musculoskeletal: Normal range of motion. She exhibits no edema.   Lymphadenopathy:     She has no cervical adenopathy.   Neurological: She is alert and oriented to person, place, and time.   Skin: Skin is warm, dry and intact. No rash noted.   Vitals reviewed.        Lab Review:   Lab Results   Component Value Date    GLUCOSE 104 (H) 07/18/2017    BUN 15 07/18/2017    CREATININE 0.80 07/18/2017    EGFRIFNONA 69 07/18/2017    BCR 18.8 " 07/18/2017    CO2 29.0 07/18/2017    CALCIUM 10.5 (H) 07/18/2017    ALBUMIN 4.00 07/15/2017    AST 21 07/15/2017    ALT 12 07/15/2017       Lab Results   Component Value Date    WBC 5.04 07/18/2017    HGB 11.5 07/18/2017    HCT 36.1 07/18/2017    MCV 96.0 07/18/2017     07/18/2017       Lab Results   Component Value Date    HGBA1C 5.80 (H) 07/16/2017       Lab Results   Component Value Date    TSH 0.590 07/16/2017           Assessment:   Overall continued acceptable course with no interim cardiopulmonary complaints with marginal, stable functional status. We will defer additional diagnostic or therapeutic intervention from a cardiac perspective at this time other than to discontinue her lisinopril antihypertensive medication and followup closely with  Dr. Romero.         Diagnosis Plan   1. Hypertensive heart disease without heart failure  No recurrent angina pectoris or CHF on limited activity; continue current treatment     2. Chronic hypertension  Will have patient hold lisinopril   3. Dyslipidemia  No data to review          Plan:         1. Patient to continue current medications and close follow up with the above providers other than to discontinue her lisinopril.  2. Tentative cardiology follow up in March 2019, or patient may return sooner PRN.    Transcribed by Louise Ward for Dr. Deniz Spence at 2:47 PM on 09/05/2018    IDeniz MD, [facility], personally performed the services described in this documentation as scribed by the above named individual in my presence, and it is both accurate and complete. At 3:04 PM on 09/05/2018

## 2018-09-13 ENCOUNTER — OFFICE VISIT (OUTPATIENT)
Dept: NEUROLOGY | Facility: CLINIC | Age: 83
End: 2018-09-13

## 2018-09-13 VITALS
DIASTOLIC BLOOD PRESSURE: 68 MMHG | SYSTOLIC BLOOD PRESSURE: 118 MMHG | WEIGHT: 174 LBS | HEIGHT: 66 IN | BODY MASS INDEX: 27.97 KG/M2

## 2018-09-13 DIAGNOSIS — R47.9 DIFFICULTY WITH SPEECH: Primary | ICD-10-CM

## 2018-09-13 PROCEDURE — 99215 OFFICE O/P EST HI 40 MIN: CPT | Performed by: PHYSICIAN ASSISTANT

## 2018-09-13 NOTE — PROGRESS NOTES
Subjective     Chief Complaint: difficulty with speech     History of Present Illness   Omayra Bhardwaj is a 83 y.o. female who returns to clinic today for evaluation of difficulty with speech. She and her family initially noted symptoms in approximately 4/18. She has noted slurred and effortful speech. This has gradually worsened over time. She notes associated difficulty swallowing as well as sialorrhea. She also notes balance impairment and a shuffling gait/ She denies any associated focal weakness, facial drooping, as well as any significant changes in vision or hearing. Additionally, she denies any tremor, bradykinesia or rigidity. She denies any aggravating factors. She is currently participating with SLP, which has been somewhat beneficial. She recently underwent a barium swallow.    She was previously seen in my clinic in 1/18 following a syncopal episode, which occurred in 7/17 in her bathroom at home after having a bowel movement. She did hit her head at this time and had associated loss of consciousness, though it is unclear for how long as this event was unfortunately unwitnessed. She and her family deny any significant fatigue or confusion after her fall as well as any additional associated neurological symptoms. An MRI of the brain was notable for a possible small traumatic subarachnoid hemorrhage in the right temporal region. She denies any recurrent syncopal episodes.       I have reviewed and confirmed the past family, social and medical history as accurate on 9/13/18.     Review of Systems   Constitutional: Negative.    HENT: Negative.    Eyes: Negative.    Respiratory: Negative.    Cardiovascular: Negative.    Gastrointestinal: Negative.    Endocrine: Negative.    Genitourinary: Negative.    Musculoskeletal: Negative.    Skin: Negative.    Allergic/Immunologic: Negative.    Neurological:        Speech difficulties     Hematological: Negative.    Psychiatric/Behavioral: Negative.   "      Objective     /68   Ht 167.6 cm (66\")   Wt 78.9 kg (174 lb)   BMI 28.08 kg/m²     General appearance today is normal.         Physical Exam   Neurological: She has normal strength. She has a normal Finger-Nose-Finger Test.   Reflex Scores:       Tricep reflexes are 1+ on the right side and 1+ on the left side.       Bicep reflexes are 1+ on the right side and 1+ on the left side.       Brachioradialis reflexes are 1+ on the right side and 1+ on the left side.       Patellar reflexes are 1+ on the right side and 1+ on the left side.       Neurologic Exam     Mental Status   Speech: (Quite slurred, slow and effortful )  Level of consciousness: alert  Normal comprehension.     Cranial Nerves   Cranial nerves II through XII intact.     Motor Exam   Muscle bulk: normal  Overall muscle tone: normal    Strength   Strength 5/5 throughout.     Sensory Exam   Light touch normal.     Gait, Coordination, and Reflexes     Gait  Gait: shuffling    Coordination   Finger to nose coordination: normal    Tremor   Resting tremor: absent    Reflexes   Right brachioradialis: 1+  Left brachioradialis: 1+  Right biceps: 1+  Left biceps: 1+  Right triceps: 1+  Left triceps: 1+  Right patellar: 1+  Left patellar: 1+Significant difficulty rising from chair            Assessment/Plan   Omayra was seen today for syncope.    Diagnoses and all orders for this visit:    Difficulty with speech  -     MRI Brain Without Contrast          Discussion/Summary   Omayra Bhardwaj returns to clinic today for evaluation of difficulty with speech. The etiology is quite unclear. It was elected to obtain an MRI of the brain to rule out a potential stroke or any other structural abnormalities. Given her examination today, I am also somewhat concerned about a potential parkinsonism. This was discussed at length with the patient and her family. She will continue to work closely with SLP. She will then follow up in 2 months , or sooner if " needed.   I spent 40 minutes minutes face to face with the patient and family with 30 minutes spent on discussing diagnosis, prognosis, diagnostic testing, evaluation, current status and management as discussed above.       As part of this visit I reviewed prior lab results, reviewed radiology results and obtained additional history from the family which is incorporated in the HPI.      Stacey Longoria PA-C

## 2018-09-14 PROBLEM — R47.9 DIFFICULTY WITH SPEECH: Status: ACTIVE | Noted: 2018-09-14

## 2018-09-20 ENCOUNTER — HOSPITAL ENCOUNTER (OUTPATIENT)
Dept: MRI IMAGING | Facility: HOSPITAL | Age: 83
Discharge: HOME | End: 2018-09-20
Admitting: PHYSICIAN ASSISTANT

## 2018-09-20 PROCEDURE — 70551 MRI BRAIN STEM W/O DYE: CPT

## 2018-10-08 RX ORDER — ACEBUTOLOL HYDROCHLORIDE 200 MG/1
CAPSULE ORAL
Qty: 30 CAPSULE | Refills: 6 | Status: SHIPPED | OUTPATIENT
Start: 2018-10-08

## 2018-10-23 ENCOUNTER — HOSPITAL ENCOUNTER (OUTPATIENT)
Dept: MAMMOGRAPHY | Facility: HOSPITAL | Age: 83
Discharge: HOME | End: 2018-10-23
Attending: SURGERY | Admitting: SURGERY

## 2018-10-23 DIAGNOSIS — N63.24 MASS OF LOWER INNER QUADRANT OF LEFT BREAST: ICD-10-CM

## 2018-10-23 PROCEDURE — 77065 DX MAMMO INCL CAD UNI: CPT

## 2018-10-23 PROCEDURE — 77065 DX MAMMO INCL CAD UNI: CPT | Performed by: RADIOLOGY

## 2018-10-24 ENCOUNTER — TELEPHONE (OUTPATIENT)
Dept: MAMMOGRAPHY | Facility: HOSPITAL | Age: 83
End: 2018-10-24

## 2018-10-24 NOTE — TELEPHONE ENCOUNTER
Left message for pt with family member to call back with treatment plan &/or any questions she may have.

## 2018-10-24 NOTE — TELEPHONE ENCOUNTER
Pt's daughter left message that pt desires to follow the [facility] in 6 months with diagnostic mammogram. Dr ROBSON Izaguirre notified.

## 2018-10-30 ENCOUNTER — TRANSCRIBE ORDERS (OUTPATIENT)
Dept: MAMMOGRAPHY | Facility: HOSPITAL | Age: 83
End: 2018-10-30

## 2018-10-30 DIAGNOSIS — N63.24 UNSPECIFIED LUMP IN THE LEFT BREAST, LOWER INNER QUADRANT: Primary | ICD-10-CM

## 2018-11-12 ENCOUNTER — LAB REQUISITION (OUTPATIENT)
Dept: LAB | Facility: HOSPITAL | Age: 83
End: 2018-11-12

## 2018-11-12 ENCOUNTER — OFFICE VISIT (OUTPATIENT)
Dept: NEUROLOGY | Facility: CLINIC | Age: 83
End: 2018-11-12

## 2018-11-12 VITALS
HEIGHT: 66 IN | WEIGHT: 174 LBS | SYSTOLIC BLOOD PRESSURE: 122 MMHG | BODY MASS INDEX: 27.97 KG/M2 | DIASTOLIC BLOOD PRESSURE: 76 MMHG

## 2018-11-12 DIAGNOSIS — R47.9 DIFFICULTY WITH SPEECH: Primary | ICD-10-CM

## 2018-11-12 DIAGNOSIS — Z00.00 ROUTINE GENERAL MEDICAL EXAMINATION AT A HEALTH CARE FACILITY: ICD-10-CM

## 2018-11-12 DIAGNOSIS — G12.22 PROGRESSIVE BULBAR PALSY (HCC): ICD-10-CM

## 2018-11-12 PROCEDURE — 99214 OFFICE O/P EST MOD 30 MIN: CPT | Performed by: PSYCHIATRY & NEUROLOGY

## 2018-11-12 NOTE — PROGRESS NOTES
"Subjective     Chief Complaint: difficulty with speech     History of Present Illness   Omayra Bhardwaj is a 83 y.o. female who returns to clinic today with a history of speech impairment. She and her family initially noted symptoms in approximately 4/18 when she noted slurred and effortful speech. This has gradually worsened over time. This was associated with dysphagia and sialorrhea. She also noted balance impairment and a shuffling gait.     An MRI without contrast has been normal.    Since her last visit on 9/13/18 she has continued to work with SLP (Megha Perez at [facility]). Her dysarthria remains severe but unchanged, with associated subjective dysphagia, particularly for think liquids. She also experiences sialorrhea. She denies other associated neurological symptoms at this time.    I have reviewed and confirmed the past family, social and medical history as accurate on 11/12/18.     Review of Systems   Constitutional: Negative.        Objective     /76   Ht 167.6 cm (65.98\")   Wt 78.9 kg (174 lb)   BMI 28.10 kg/m²     General appearance today is normal.       Physical Exam   Neurological: She has normal strength.   Psychiatric: Her speech is slurred.        Neurologic Exam     Mental Status   Oriented to person.   Attention: normal.   Speech: slurred   Level of consciousness: alert  Normal comprehension.     Cranial Nerves   Cranial nerves II through XII intact.   Except for severe dysarthria     Motor Exam   Muscle bulk: normal  Overall muscle tone: normal    Strength   Strength 5/5 throughout.     Sensory Exam   Light touch normal.     Gait, Coordination, and Reflexes     Gait  Gait: (short stepped but steady)          Assessment/Plan   Omayra was seen today for difficulty with speech.    Diagnoses and all orders for this visit:    Difficulty with speech  -     Vitamin B12; Future  -     RPR; Future  -     TSH  -     Folate  -     EMG & Nerve Conduction Test; Future  -     MRI Brain " With Contrast; Future  -     Acetylcholine Receptor Antibody Panel; Future    Progressive bulbar palsy (CMS/HCC)   -     EMG & Nerve Conduction Test; Future        Discussion/Summary   Omayar Bhardwaj returns to clinic today with a difficult history of severe dysarthria. The etiology remains unclear and her neurologic examination is surprisingly intact beyond her dysarthria. I would like to initiate further evaluation including screening of neuromuscular disease and repeat an MRI with contrast looking for cranial nerve or brainstem pathology. If these studies are normal, then I would next consider pursuing a CSF examination.      As part of this visit I reviewed prior lab results, reviewed radiology results and obtained additional history from the family which is incorporated in the HPI.      Jabier Lopez MD

## 2018-11-16 LAB
ACHR BIND AB SER-SCNC: <0.03 NMOL/L (ref 0–0.24)
ACHR BLOCK AB SER-ACNC: 13 % (ref 0–25)
ACHR MOD AB/ACHR TOTAL SFR SER: <12 % (ref 0–20)
RPR SER QL: NON REACTIVE
TSH SERPL DL<=0.005 MIU/L-ACNC: 1.31 UIU/ML (ref 0.45–4.5)
VIT B12 SERPL-MCNC: 423 PG/ML (ref 232–1245)

## 2018-11-17 ENCOUNTER — RESULTS ENCOUNTER (OUTPATIENT)
Dept: NEUROLOGY | Facility: CLINIC | Age: 83
End: 2018-11-17

## 2018-11-17 DIAGNOSIS — R47.9 DIFFICULTY WITH SPEECH: ICD-10-CM

## 2018-11-30 ENCOUNTER — HOSPITAL ENCOUNTER (OUTPATIENT)
Dept: MRI IMAGING | Facility: HOSPITAL | Age: 83
Discharge: HOME | End: 2018-11-30
Attending: PSYCHIATRY & NEUROLOGY | Admitting: PSYCHIATRY & NEUROLOGY

## 2018-11-30 DIAGNOSIS — G12.22 PROGRESSIVE BULBAR PALSY (HCC): ICD-10-CM

## 2018-11-30 DIAGNOSIS — R47.9 DIFFICULTY WITH SPEECH: ICD-10-CM

## 2018-11-30 PROCEDURE — A9577 INJ MULTIHANCE: HCPCS | Performed by: PSYCHIATRY & NEUROLOGY

## 2018-11-30 PROCEDURE — 70553 MRI BRAIN STEM W/O & W/DYE: CPT

## 2018-11-30 PROCEDURE — 0 GADOBENATE DIMEGLUMINE 529 MG/ML SOLUTION: Performed by: PSYCHIATRY & NEUROLOGY

## 2018-11-30 PROCEDURE — 82565 ASSAY OF CREATININE: CPT

## 2018-11-30 RX ADMIN — GADOBENATE DIMEGLUMINE 10 ML: 529 INJECTION, SOLUTION INTRAVENOUS at 14:14

## 2018-12-03 LAB — CREAT BLDA-MCNC: 0.8 MG/DL (ref 0.6–1.3)

## 2018-12-26 ENCOUNTER — OFFICE VISIT (OUTPATIENT)
Dept: NEUROLOGY | Facility: CLINIC | Age: 83
End: 2018-12-26

## 2018-12-26 VITALS
OXYGEN SATURATION: 97 % | HEART RATE: 60 BPM | BODY MASS INDEX: 27.97 KG/M2 | DIASTOLIC BLOOD PRESSURE: 74 MMHG | SYSTOLIC BLOOD PRESSURE: 124 MMHG | WEIGHT: 174 LBS | HEIGHT: 66 IN

## 2018-12-26 DIAGNOSIS — R47.9 DIFFICULTY WITH SPEECH: Primary | ICD-10-CM

## 2018-12-26 DIAGNOSIS — G52.8 DISORDERS OF OTHER SPECIFIED CRANIAL NERVES: ICD-10-CM

## 2018-12-26 PROCEDURE — 99214 OFFICE O/P EST MOD 30 MIN: CPT | Performed by: PSYCHIATRY & NEUROLOGY

## 2018-12-26 NOTE — PROGRESS NOTES
"Subjective     Chief Complaint: difficulty with speech     History of Present Illness   Omayra Bhardwaj is a 83 y.o. female who returns to clinic today with a history of speech impairment. She and her family initially noted symptoms in approximately 4/18 when she noted slurred and effortful speech. This has gradually worsened over time. This was associated with dysphagia and sialorrhea. She also noted balance impairment and a shuffling gait.     An MRI without contrast has been normal.    Since her last visit on 11/12/18 screening labs (including AChR Atb's) and an MRI with/without contrast have been normal. EMG's are pending. She has remained largely unchanged. Her symptoms continue to primarily relate to speech and swallowing, without other clearly associated symptoms.    I have reviewed and confirmed the past family, social and medical history as accurate on 12/26/18.     Review of Systems   Constitutional: Negative.        Objective     /74 (BP Location: Right arm, Patient Position: Sitting, Cuff Size: Adult)   Pulse 60   Ht 167.6 cm (65.98\")   Wt 78.9 kg (174 lb)   LMP  (LMP Unknown)   SpO2 97%   BMI 28.10 kg/m²     General appearance today is normal.       Physical Exam   Constitutional: She is oriented to person, place, and time.   Neurological: She is oriented to person, place, and time. She has normal strength.   Reflex Scores:       Tricep reflexes are 1+ on the right side and 1+ on the left side.       Bicep reflexes are 1+ on the right side and 1+ on the left side.       Brachioradialis reflexes are 1+ on the right side and 1+ on the left side.       Neurologic Exam     Mental Status   Oriented to person, place, and time.   Registration: recalls 3 of 3 objects. Recall at 5 minutes: recalls 1 of 3 objects. Follows 3 step commands.   Attention: normal.   Speech: (Severe dysarthria)  Level of consciousness: alert  Knowledge: good.   Able to name object. Able to read. Able to repeat. Able to " write. Normal comprehension.     Cranial Nerves   Cranial nerves II through XII intact.   Except for severe dysarthria     Motor Exam   Muscle bulk: normal  Overall muscle tone: normal    Strength   Strength 5/5 throughout.     Gait, Coordination, and Reflexes     Gait  Gait: (short stepped but steady)    Reflexes   Right brachioradialis: 1+  Left brachioradialis: 1+  Right biceps: 1+  Left biceps: 1+  Right triceps: 1+  Left triceps: 1+          Assessment/Plan   Omayra was seen today for difficulty with speech.    Diagnoses and all orders for this visit:    Difficulty with speech  -     EMG & Nerve Conduction Test; Future    Disorders of other specified cranial nerves   -     EMG & Nerve Conduction Test; Future        Discussion/Summary   Omayra Bhardwaj returns to clinic today with a difficult history of severe dysarthria. The etiology remains unclear and her neurologic examination is surprisingly intact beyond her dysarthria. I would like now to obtain EMG's. Pending these, I will consider a CSF examination. I might consider a trial of steroids or IVIg pending these results, and also raised the possibility of referral for a 2nd opinion.    I spent 25 minutes face to face with the patient and family. I spent 15 minutes counseling and discussing diagnosis, prognosis, diagnostic testing, current status and management.    As part of this visit I reviewed prior lab results, reviewed radiology results and obtained additional history from the family which is incorporated in the HPI.      Jabier Lopez MD

## 2019-01-01 NOTE — THERAPY DISCHARGE NOTE
Acute Care - Occupational Therapy Discharge Summary  [facility]     Patient Name: Omayra Bhardwaj  : 1935  MRN: [id number]    Today's Date: 2017  Onset of Illness/Injury or Date of Surgery Date: 07/15/17    Date of Referral to OT: 17  Referring Physician: Jonathan      Admit Date: 7/15/2017        OT Recommendation and Plan    Visit Dx:    ICD-10-CM ICD-9-CM   1. Subarachnoid hemorrhage, traumatic, without loss of consciousness, initial encounter S06.6X0A 852.01   2. Closed fracture of nasal bone, initial encounter S02.2XXA 802.0   3. Maxillary sinus fracture, closed, initial encounter S02.401A 802.4   4. Chest wall contusion, left, initial encounter S20.212A 922.1   5. Impaired mobility and ADLs Z74.09 799.89   6. Impaired functional mobility, balance, gait, and endurance Z74.09 V49.89                     OT Goals       17 1300 17 0908       Bed Mobility OT LTG    Bed Mobility OT LTG, Time to Achieve  by discharge  -TB     Bed Mobility OT LTG, Activity Type  all bed mobility  -TB     Bed Mobility OT LTG, [place] Level  minimum assist (75% patient effort);verbal cues required  -TB     Bed Mobility OT LTG, Outcome goal ongoing  -ST      Transfer Training OT LTG    Transfer Training OT LTG, Time to Achieve  by discharge  -TB     Transfer Training OT LTG, Activity Type  toilet;tub  -TB     Transfer Training OT LTG, [place] Level  contact guard assist;verbal cues required  -TB     Transfer Training OT LTG, Assist Device  commode, bedside;walker, rolling  -TB     Transfer Training OT LTG, Additional Goal  educate for TTB  -TB     Transfer Training OT LTG, Outcome goal ongoing  -ST      Patient Education OT LTG    Patient Education OT LTG, Time to Achieve  by discharge  -TB     Patient Education OT LTG, Education Type  written program;HEP;home safety;aware of neuro deficits  -TB     Patient Education OT LTG, Education Understanding  demonstrates adequately;verbalizes  understanding  -TB     Patient Education OT LTG, Additional Goal  Pt completing SROM/walk-walks L UE - per outpt PT HEP  -TB     Patient Education OT LTG Outcome goal partially met  -ST      UB Dressing OT LTG    UB Dressing Goal OT LTG, Time to Achieve  by discharge  -TB     UB Dressing Goal OT LTG, [place] Level  minimum assist (75% patient effort)  -TB     UB Dressing Goal OT LTG, Additional Goal  via lei-technique  -TB     UB Dressing Goal OT LTG, Outcome goal ongoing  -ST        User Key  (r) = Recorded By, (t) = Taken By, (c) = Cosigned By    Initials Name Provider Type    TB Latonya Ramírez, OT Occupational Therapist    ST Sujey Montoya, OTR Occupational Therapist                Outcome Measures       07/24/17 1500          Modified [place] Scale    Modified [place] Scale 3 - Moderate disability.  Requiring some help, but able to walk without assistance.  -JR      Functional Assessment    Outcome Measure Options Modified Ambreen  -JR        User Key  (r) = Recorded By, (t) = Taken By, (c) = Cosigned By    Initials Name Provider Type    JR Suzanna Mauricio, OT Occupational Therapist              OT Discharge Summary  Reason for Discharge: Discharge from facility  Outcomes Achieved: Refer to plan of care for updates on goals achieved  Discharge Destination: Home with home health      Suzanna Mauricio OT  7/24/2017    Statement Selected

## 2019-01-04 ENCOUNTER — HOSPITAL ENCOUNTER (OUTPATIENT)
Dept: NEUROLOGY | Facility: HOSPITAL | Age: 84
Discharge: HOME | End: 2019-01-04
Attending: PSYCHIATRY & NEUROLOGY | Admitting: PSYCHIATRY & NEUROLOGY

## 2019-01-04 ENCOUNTER — APPOINTMENT (OUTPATIENT)
Dept: NEUROLOGY | Facility: HOSPITAL | Age: 84
End: 2019-01-04
Attending: PSYCHIATRY & NEUROLOGY

## 2019-01-04 DIAGNOSIS — G12.22 PROGRESSIVE BULBAR PALSY (HCC): ICD-10-CM

## 2019-01-04 DIAGNOSIS — R47.9 DIFFICULTY WITH SPEECH: ICD-10-CM

## 2019-01-04 PROCEDURE — 95911 NRV CNDJ TEST 9-10 STUDIES: CPT

## 2019-01-04 PROCEDURE — 95886 MUSC TEST DONE W/N TEST COMP: CPT

## 2019-01-07 ENCOUNTER — TELEPHONE (OUTPATIENT)
Dept: NEUROLOGY | Facility: CLINIC | Age: 84
End: 2019-01-07

## 2019-01-07 NOTE — TELEPHONE ENCOUNTER
----- Message from Jabier Lopez MD sent at 1/4/2019 12:43 PM EST -----  Please let patient/family know that EMG's don't show a clear cause for her symptoms. At this point I would like to proceed on with a spinal fluid examination and/or referral for a 2nd opinion, depending on her preference. Thanks.

## 2019-01-07 NOTE — TELEPHONE ENCOUNTER
Spoke with daughter and she would like to discuss these options with Kit and the rest of the family and then get back to us.

## 2019-01-14 PROBLEM — R41.89 IMPAIRED COGNITION: Status: ACTIVE | Noted: 2017-12-05

## 2019-01-14 PROBLEM — G12.22 PROGRESSIVE BULBAR PALSY (HCC): Status: ACTIVE | Noted: 2018-11-30

## 2019-01-14 PROBLEM — N18.30 CHRONIC KIDNEY DISEASE, STAGE 3 (MODERATE): Status: ACTIVE | Noted: 2019-01-14

## 2019-01-14 PROBLEM — M75.02 ADHESIVE CAPSULITIS OF LEFT SHOULDER: Status: ACTIVE | Noted: 2017-12-05

## 2019-01-14 PROBLEM — G31.84 MILD NEUROCOGNITIVE DISORDER: Status: ACTIVE | Noted: 2017-12-05

## 2019-01-14 PROBLEM — M77.9 CAPSULITIS: Status: ACTIVE | Noted: 2017-12-05

## 2019-01-14 PROBLEM — Z91.81 RISK FOR FALLS: Status: ACTIVE | Noted: 2019-01-14

## 2019-01-14 PROBLEM — R26.89 MULTIFACTORIAL GAIT DISORDER: Status: ACTIVE | Noted: 2019-01-14

## 2019-01-14 PROBLEM — I69.391 DYSPHAGIA AS LATE EFFECT OF CEREBROVASCULAR ACCIDENT (CVA): Status: ACTIVE | Noted: 2018-08-22

## 2019-01-14 PROBLEM — S06.6XAA SUBARACHNOID HEMORRHAGE, TRAUMATIC (HCC): Status: RESOLVED | Noted: 2017-07-15 | Resolved: 2019-01-14

## 2019-01-17 PROBLEM — I83.90 VARICOSE VEINS OF LOWER EXTREMITIES WITHOUT ULCER OR INFLAMMATION: Status: ACTIVE | Noted: 2019-01-17

## 2019-01-17 PROBLEM — G89.29 CHRONIC LEFT SHOULDER PAIN: Status: ACTIVE | Noted: 2019-01-17

## 2019-01-17 PROBLEM — S06.6X9A SUBARACHNOID HEMORRHAGE AFTER TRAUMATIC INJURY WITHOUT OPEN INTRACRANIAL WOUND, WITH PROLONGED LOSS OF CONSCIOUSNESS AND RETURN TO PRE-EXISTING LEVEL OF CONSCIOUSNESS (HCC): Status: ACTIVE | Noted: 2019-01-17

## 2019-01-17 PROBLEM — N39.41 URGE INCONTINENCE OF URINE: Status: ACTIVE | Noted: 2019-01-17

## 2019-01-17 PROBLEM — G89.29 CHRONIC PAIN OF BOTH KNEES: Status: ACTIVE | Noted: 2019-01-17

## 2019-01-17 PROBLEM — M25.561 CHRONIC PAIN OF BOTH KNEES: Status: ACTIVE | Noted: 2019-01-17

## 2019-01-17 PROBLEM — E83.52 HYPERCALCEMIA: Status: ACTIVE | Noted: 2019-01-17

## 2019-01-17 PROBLEM — R47.1 DYSARTHRIA: Status: ACTIVE | Noted: 2019-01-17

## 2019-01-17 PROBLEM — M54.50 CHRONIC BILATERAL LOW BACK PAIN WITHOUT SCIATICA: Status: ACTIVE | Noted: 2019-01-17

## 2019-01-17 PROBLEM — E66.9 OBESITY, CLASS I, BMI 30-34.9: Status: ACTIVE | Noted: 2019-01-17

## 2019-01-17 PROBLEM — M25.512 CHRONIC LEFT SHOULDER PAIN: Status: ACTIVE | Noted: 2019-01-17

## 2019-01-17 PROBLEM — R53.1 GENERAL WEAKNESS: Status: ACTIVE | Noted: 2019-01-17

## 2019-01-17 PROBLEM — M25.562 CHRONIC PAIN OF BOTH KNEES: Status: ACTIVE | Noted: 2019-01-17

## 2019-01-17 PROBLEM — G89.29 CHRONIC BILATERAL LOW BACK PAIN WITHOUT SCIATICA: Status: ACTIVE | Noted: 2019-01-17

## 2019-01-17 PROBLEM — R35.0 URINARY FREQUENCY: Status: ACTIVE | Noted: 2019-01-17

## 2019-01-17 PROBLEM — R21 SCALY PATCH RASH: Status: ACTIVE | Noted: 2019-01-17

## 2019-01-23 ENCOUNTER — TELEPHONE (OUTPATIENT)
Dept: INTERNAL MEDICINE | Facility: CLINIC | Age: 84
End: 2019-01-23

## 2019-01-25 ENCOUNTER — HOSPITAL ENCOUNTER (OUTPATIENT)
Dept: GENERAL RADIOLOGY | Facility: HOSPITAL | Age: 84
Discharge: HOME | End: 2019-01-25
Admitting: NURSE PRACTITIONER

## 2019-01-25 ENCOUNTER — OFFICE VISIT (OUTPATIENT)
Dept: INTERNAL MEDICINE | Facility: CLINIC | Age: 84
End: 2019-01-25

## 2019-01-25 VITALS
TEMPERATURE: 99.6 F | HEIGHT: 66 IN | HEART RATE: 60 BPM | DIASTOLIC BLOOD PRESSURE: 76 MMHG | WEIGHT: 160 LBS | SYSTOLIC BLOOD PRESSURE: 132 MMHG | BODY MASS INDEX: 25.71 KG/M2

## 2019-01-25 DIAGNOSIS — R05.9 COUGH: Primary | ICD-10-CM

## 2019-01-25 DIAGNOSIS — R05.9 COUGH: ICD-10-CM

## 2019-01-25 LAB
EXPIRATION DATE: NORMAL
FLUAV AG NPH QL: NEGATIVE
FLUBV AG NPH QL: NEGATIVE
INTERNAL CONTROL: NORMAL
Lab: NORMAL

## 2019-01-25 PROCEDURE — 87502 INFLUENZA DNA AMP PROBE: CPT | Performed by: NURSE PRACTITIONER

## 2019-01-25 PROCEDURE — 99214 OFFICE O/P EST MOD 30 MIN: CPT | Performed by: NURSE PRACTITIONER

## 2019-01-25 PROCEDURE — 71046 X-RAY EXAM CHEST 2 VIEWS: CPT

## 2019-01-25 RX ORDER — TRIAMCINOLONE ACETONIDE 5 MG/G
CREAM TOPICAL EVERY 12 HOURS SCHEDULED
COMMUNITY
Start: 2018-05-23

## 2019-01-25 RX ORDER — AZITHROMYCIN 250 MG/1
TABLET, FILM COATED ORAL
Qty: 6 TABLET | Refills: 0 | Status: SHIPPED | OUTPATIENT
Start: 2019-01-25 | End: 2019-02-23

## 2019-01-25 RX ORDER — GUAIFENESIN 600 MG/1
1200 TABLET, EXTENDED RELEASE ORAL 2 TIMES DAILY
Qty: 14 TABLET | Refills: 0 | Status: SHIPPED | OUTPATIENT
Start: 2019-01-25 | End: 2019-02-25

## 2019-01-25 RX ORDER — UBIDECARENONE 75 MG
CAPSULE ORAL
COMMUNITY
Start: 2018-05-09

## 2019-01-25 NOTE — PROGRESS NOTES
Omayra Bhardwaj  1935  [id number]  Patient Care Team:  Kitty Romero MD as PCP - General  Kitty Romero MD as PCP - Family Medicine    Omayra Bhardwaj is a pleasant 83 y.o. female who presents for evaluation of Fatigue and Cough (productive cough with yelloow sputum x 3 days )      This patient is accompanied by their daughter, Prabhu, who contributes to the history of their care.    Chief Complaint   Patient presents with   • Fatigue   • Cough     productive cough with yelloow sputum x 3 days        HPI:   Productive cough x 3 days.  No fever or night sweats, but extreme fatigue.  Denies shortness of breath, lower extremity swelling.  Note: Patient has slurred speech historically related to cranial nerve problem.  Daughter helps provide history    Past Medical History:   Diagnosis Date   • Chronic hypertension     probably essential.     • Chronic lymphedema     with probable chronic lower extremity venous insufficiency with recent acceptable bilateral lower extremity venous duplex study, March 2013.   • Depression    • Disordered sleep     with prominent snoring and occasional nocturnal hypersomnolence.    • Dyslipidemia      untreated.   • Former tobacco use    • GERD (gastroesophageal reflux disease)    • History of obesity     (BMI 29.5).   • History of obesity     (BMI 29.5).   • Hypercalcemia    • Hyperlipidemia    • Hyperparathyroidism (CMS/HCC)     with contemplated parathyroidectomy (Dr. Davis, [facility]).   • Hypertensive cardiovascular disease     probable   • Subarachnoid hemorrhage, traumatic (CMS/HCC) 7/15/2017   • Thyroid nodule     workup pending.       Past Surgical History:   Procedure Laterality Date   • APPENDECTOMY  1945   • BREAST BIOPSY Left    • BREAST EXCISIONAL BIOPSY Left    • CATARACT EXTRACTION  2010    bilateral    • OTHER SURGICAL HISTORY  1986    Laparoscopy   • PARATHYROIDECTOMY     • SQUAMOUS CELL CARCINOMA EXCISION  2010   •  "TONSILLECTOMY         Family History   Problem Relation Age of Onset   • Cancer Mother    • Heart disease Brother    • Heart failure Brother    • Cancer Brother    • Breast cancer Neg Hx    • Ovarian cancer Neg Hx        Social History     Tobacco Use   Smoking Status Former Smoker   • Types: Cigarettes   • Last attempt to quit:    • Years since quittin.1   Smokeless Tobacco Never Used       No Known Allergies    Review of Systems   Constitutional: Positive for fatigue. Negative for chills and fever.   HENT: Positive for congestion. Negative for ear pain and sinus pressure.    Respiratory: Positive for cough and shortness of breath. Negative for chest tightness and wheezing.    Cardiovascular: Positive for palpitations. Negative for chest pain.   Gastrointestinal: Negative for abdominal pain, blood in stool and constipation.   Skin: Negative for color change.   Allergic/Immunologic: Negative for environmental allergies.   Neurological: Positive for speech difficulty. Negative for dizziness and headache.   Psychiatric/Behavioral: Negative for decreased concentration. The patient is not nervous/anxious.        Vitals:    19 1151   BP: 132/76   BP Location: Left arm   Patient Position: Sitting   Cuff Size: Adult   Pulse: 60   Temp: 99.6 °F (37.6 °C)   TempSrc: Temporal   Weight: 72.6 kg (160 lb)   Height: 167.6 cm (65.98\")   PainSc: 0-No pain         Current Outpatient Medications:   •  atorvastatin (LIPITOR) 10 MG tablet, Take 1/2 tablet (dose = 5 mg) nightly, Disp: , Rfl:   •  Cholecalciferol (VITAMIN D3) 2000 units chewable tablet, Chew 1 tablet Daily., Disp: , Rfl:   •  vitamin D (ERGOCALCIFEROL) 95890 UNITS capsule capsule, Take 50,000 Units by mouth 1 (One) Time Per Week. Monday, Disp: , Rfl:   •  acebutolol (SECTRAL) 200 MG capsule, TAKE (1) CAPSULE BY MOUTH DAILY., Disp: 30 capsule, Rfl: 6  •  azithromycin (ZITHROMAX Z-CORTNEY) 250 MG tablet, Take 2 tablets the first day, then 1 tablet daily for " 4 days., Disp: 6 tablet, Rfl: 0  •  B Complex Vitamins (VITAMIN B COMPLEX PO), Take  by mouth Daily., Disp: , Rfl:   •  diphenhydrAMINE (BENADRYL) 2 % cream, Apply  topically to the appropriate area as directed., Disp: , Rfl:   •  famotidine (PEPCID) 20 MG tablet, Take 20 mg by mouth 2 (Two) Times a Day., Disp: , Rfl:   •  guaiFENesin (MUCINEX) 600 MG 12 hr tablet, Take 2 tablets by mouth 2 (Two) Times a Day., Disp: 14 tablet, Rfl: 0  •  triamcinolone (KENALOG) 0.5 % cream, Apply  topically to the appropriate area as directed Every 12 (Twelve) Hours., Disp: , Rfl:   •  vitamin B-12 (CYANOCOBALAMIN) 100 MCG tablet, Take  by mouth., Disp: , Rfl:     Physical Exam   Constitutional: She appears well-developed and well-nourished.   HENT:   Head: Normocephalic and atraumatic.   Right Ear: External ear normal.   Left Ear: External ear normal.   Mouth/Throat: Oropharynx is clear and moist.   Eyes: Conjunctivae and EOM are normal.   Neck: Normal range of motion. Neck supple.   Cardiovascular: Normal rate, regular rhythm and normal heart sounds.   Pulmonary/Chest: Effort normal and breath sounds normal.   Abdominal: Soft. Bowel sounds are normal.   Musculoskeletal: Normal range of motion.   Lymphadenopathy:     She has no cervical adenopathy.   Neurological: She is alert.   Skin: Skin is warm and dry.   Psychiatric: She has a normal mood and affect. Her behavior is normal. Thought content normal.     Assessment/Plan:    Problem List Items Addressed This Visit     None      Visit Diagnoses     Cough    -  Primary    Relevant Orders    POCT Influenza A/B (Completed)    XR Chest PA & Lateral        Called daughter Jean at [phone] with results of chest x-ray  Plan of care reviewed with patient at the conclusion of today's visit. Education was provided regarding diagnosis, management and any prescribed or recommended OTC medications.  Patient verbalizes understanding of and agreement with management plan.    Return in about  1 week (around 2/1/2019), or if symptoms worsen or fail to improve.    Meg Yao, APRN

## 2019-02-04 ENCOUNTER — TELEPHONE (OUTPATIENT)
Dept: NEUROLOGY | Facility: CLINIC | Age: 84
End: 2019-02-04

## 2019-02-04 ENCOUNTER — TELEPHONE (OUTPATIENT)
Dept: INTERNAL MEDICINE | Facility: CLINIC | Age: 84
End: 2019-02-04

## 2019-02-04 NOTE — TELEPHONE ENCOUNTER
Pt's daughter called in asking for specialist referral at [facility] one of two Doctors     Dr. Lonny [facility]    Dr. Yanick [facility]     Pt daughter Prabhu   [phone]

## 2019-02-04 NOTE — TELEPHONE ENCOUNTER
VERBAL ORDER FOR PHYSICAL THERAPY AND TO LET DR PEREIRA KNOW THAT SHE INTENDS  TO DISCHARGE PATIENT FROM SPEECH THERAPY ON THE 18.  SHE IS CONCERNED ABOUT THE NEUROLOGY DIAGNOSIS.

## 2019-02-06 DIAGNOSIS — G12.22 BULBAR PALSY (HCC): Primary | ICD-10-CM

## 2019-02-06 NOTE — TELEPHONE ENCOUNTER
Spoke with Prabhu and they are needing a second opinion referral to [facility] (Discussed 3 wks ago with )    Daughter also states Neck muscles weakening, head bent forward, might be beneficial to have a brace. As well trouble with walking downstairs, but not sure what to do for this. Has an evaluation on the 20 th with PCP, meanwhile anything that can be done for neck support?

## 2019-02-06 NOTE — TELEPHONE ENCOUNTER
Prabhu called back and LVM that the only time she is available to talk will be between 2-3 pm this afternoon. PH#[phone]

## 2019-02-07 ENCOUNTER — TELEPHONE (OUTPATIENT)
Dept: INTERNAL MEDICINE | Facility: CLINIC | Age: 84
End: 2019-02-07

## 2019-02-07 NOTE — TELEPHONE ENCOUNTER
Pt has been treated for several month for speech and swallow impairment. She has not improved. She is not able to  her head. He is at a pureed diet with thickened liquids.  She has not yet received a diagnoses from neurology. She wants to know what you think about discharging to  palative care.

## 2019-02-07 NOTE — TELEPHONE ENCOUNTER
Spoke with Megha. She is still going to have PT come and evaluate and if they can not do anything wll discharge to palliative care/

## 2019-02-21 PROBLEM — M77.9 CAPSULITIS: Status: ACTIVE | Noted: 2019-02-21

## 2019-02-21 PROBLEM — E66.9 OBESITY: Status: ACTIVE | Noted: 2019-02-21

## 2019-02-21 PROBLEM — N18.30 HYPERTENSIVE HEART AND CHRONIC KIDNEY DISEASE STAGE 3 (HCC): Status: ACTIVE | Noted: 2019-02-21

## 2019-02-21 PROBLEM — I13.10 HYPERTENSIVE HEART AND CHRONIC KIDNEY DISEASE STAGE 3 (HCC): Status: ACTIVE | Noted: 2019-02-21

## 2019-02-21 PROBLEM — M25.519 SHOULDER PAIN: Status: ACTIVE | Noted: 2019-02-21

## 2019-02-21 PROBLEM — I83.93 VARICOSE VEINS OF BOTH LOWER EXTREMITIES: Status: ACTIVE | Noted: 2019-02-21

## 2019-02-23 PROBLEM — N39.0 UTI (URINARY TRACT INFECTION): Status: RESOLVED | Noted: 2017-07-15 | Resolved: 2019-02-23

## 2019-02-25 ENCOUNTER — OFFICE VISIT (OUTPATIENT)
Dept: INTERNAL MEDICINE | Facility: CLINIC | Age: 84
End: 2019-02-25

## 2019-02-25 VITALS — HEART RATE: 80 BPM | SYSTOLIC BLOOD PRESSURE: 128 MMHG | DIASTOLIC BLOOD PRESSURE: 64 MMHG

## 2019-02-25 DIAGNOSIS — R47.1 DYSARTHRIA: ICD-10-CM

## 2019-02-25 DIAGNOSIS — I50.32 CHRONIC DIASTOLIC CHF (CONGESTIVE HEART FAILURE), NYHA CLASS 2 (HCC): ICD-10-CM

## 2019-02-25 DIAGNOSIS — I10 BENIGN ESSENTIAL HYPERTENSION: ICD-10-CM

## 2019-02-25 DIAGNOSIS — E53.8 B12 DEFICIENCY: ICD-10-CM

## 2019-02-25 DIAGNOSIS — F33.0 MILD EPISODE OF RECURRENT MAJOR DEPRESSIVE DISORDER (HCC): ICD-10-CM

## 2019-02-25 DIAGNOSIS — I50.32 HYPERTENSIVE HEART DISEASE WITH CHRONIC DIASTOLIC CONGESTIVE HEART FAILURE (HCC): ICD-10-CM

## 2019-02-25 DIAGNOSIS — R53.1 GENERAL WEAKNESS: ICD-10-CM

## 2019-02-25 DIAGNOSIS — M53.82 NECK MUSCLE WEAKNESS: ICD-10-CM

## 2019-02-25 DIAGNOSIS — R26.89 MULTIFACTORIAL GAIT DISORDER: ICD-10-CM

## 2019-02-25 DIAGNOSIS — G12.22 PROGRESSIVE BULBAR PALSY (HCC): Primary | ICD-10-CM

## 2019-02-25 DIAGNOSIS — I11.0 HYPERTENSIVE HEART DISEASE WITH CHRONIC DIASTOLIC CONGESTIVE HEART FAILURE (HCC): ICD-10-CM

## 2019-02-25 DIAGNOSIS — M75.02 ADHESIVE CAPSULITIS OF LEFT SHOULDER: ICD-10-CM

## 2019-02-25 DIAGNOSIS — E78.00 HYPERCHOLESTEROLEMIA: ICD-10-CM

## 2019-02-25 DIAGNOSIS — E21.0 HYPERPARATHYROIDISM, PRIMARY (HCC): ICD-10-CM

## 2019-02-25 PROBLEM — E66.9 OBESITY, CLASS I, BMI 30-34.9: Status: RESOLVED | Noted: 2019-01-17 | Resolved: 2019-02-25

## 2019-02-25 PROBLEM — R55 SYNCOPE: Status: RESOLVED | Noted: 2018-01-29 | Resolved: 2019-02-25

## 2019-02-25 PROCEDURE — 99214 OFFICE O/P EST MOD 30 MIN: CPT | Performed by: INTERNAL MEDICINE

## 2019-02-26 LAB — BNP SERPL-MCNC: NORMAL PG/ML

## 2019-02-28 LAB
25(OH)D3+25(OH)D2 SERPL-MCNC: 17.1 NG/ML (ref 30–100)
ALBUMIN SERPL-MCNC: 4.1 G/DL (ref 3.5–4.7)
ALBUMIN/CREAT UR: 5.8 MG/G CREAT (ref 0–30)
ALBUMIN/GLOB SERPL: 1.9 {RATIO} (ref 1.2–2.2)
ALP SERPL-CCNC: 77 IU/L (ref 39–117)
ALT SERPL-CCNC: 15 IU/L (ref 0–32)
AST SERPL-CCNC: 26 IU/L (ref 0–40)
BASOPHILS # BLD AUTO: 0 X10E3/UL (ref 0–0.2)
BASOPHILS NFR BLD AUTO: 0 %
BILIRUB SERPL-MCNC: 0.4 MG/DL (ref 0–1.2)
BUN SERPL-MCNC: 21 MG/DL (ref 8–27)
BUN/CREAT SERPL: 31 (ref 12–28)
CALCIUM SERPL-MCNC: 11 MG/DL (ref 8.7–10.3)
CHLORIDE SERPL-SCNC: 106 MMOL/L (ref 96–106)
CO2 SERPL-SCNC: 24 MMOL/L (ref 20–29)
CREAT SERPL-MCNC: 0.68 MG/DL (ref 0.57–1)
CREAT UR-MCNC: 171.3 MG/DL
EOSINOPHIL # BLD AUTO: 0.1 X10E3/UL (ref 0–0.4)
EOSINOPHIL NFR BLD AUTO: 2 %
ERYTHROCYTE [DISTWIDTH] IN BLOOD BY AUTOMATED COUNT: 13.8 % (ref 12.3–15.4)
GLOBULIN SER CALC-MCNC: 2.2 G/DL (ref 1.5–4.5)
GLUCOSE SERPL-MCNC: 66 MG/DL (ref 65–99)
HCT VFR BLD AUTO: 42.6 % (ref 34–46.6)
HGB BLD-MCNC: 13.8 G/DL (ref 11.1–15.9)
IMM GRANULOCYTES # BLD AUTO: 0 X10E3/UL (ref 0–0.1)
IMM GRANULOCYTES NFR BLD AUTO: 0 %
LYMPHOCYTES # BLD AUTO: 1.6 X10E3/UL (ref 0.7–3.1)
LYMPHOCYTES NFR BLD AUTO: 28 %
MCH RBC QN AUTO: 29.6 PG (ref 26.6–33)
MCHC RBC AUTO-ENTMCNC: 32.4 G/DL (ref 31.5–35.7)
MCV RBC AUTO: 91 FL (ref 79–97)
MICROALBUMIN UR-MCNC: 10 UG/ML
MONOCYTES # BLD AUTO: 0.5 X10E3/UL (ref 0.1–0.9)
MONOCYTES NFR BLD AUTO: 8 %
NEUTROPHILS # BLD AUTO: 3.5 X10E3/UL (ref 1.4–7)
NEUTROPHILS NFR BLD AUTO: 62 %
PLATELET # BLD AUTO: 215 X10E3/UL (ref 150–379)
POTASSIUM SERPL-SCNC: 4.7 MMOL/L (ref 3.5–5.2)
PROT SERPL-MCNC: 6.3 G/DL (ref 6–8.5)
PTH-INTACT SERPL-MCNC: NORMAL PG/ML
RBC # BLD AUTO: 4.66 X10E6/UL (ref 3.77–5.28)
REQUEST PROBLEM: NORMAL
SODIUM SERPL-SCNC: 145 MMOL/L (ref 134–144)
SPECIMEN STATUS: NORMAL
WBC # BLD AUTO: 5.6 X10E3/UL (ref 3.4–10.8)

## 2019-03-06 ENCOUNTER — TELEPHONE (OUTPATIENT)
Dept: NEUROLOGY | Facility: CLINIC | Age: 84
End: 2019-03-06

## 2019-03-06 ENCOUNTER — TELEPHONE (OUTPATIENT)
Dept: INTERNAL MEDICINE | Facility: CLINIC | Age: 84
End: 2019-03-06

## 2019-03-27 ENCOUNTER — APPOINTMENT (OUTPATIENT)
Dept: CT IMAGING | Facility: HOSPITAL | Age: 84
End: 2019-03-27

## 2019-03-27 ENCOUNTER — APPOINTMENT (OUTPATIENT)
Dept: GENERAL RADIOLOGY | Facility: HOSPITAL | Age: 84
End: 2019-03-27

## 2019-03-27 ENCOUNTER — HOSPITAL ENCOUNTER (EMERGENCY)
Facility: HOSPITAL | Age: 84
Discharge: HOME | End: 2019-03-28
Attending: EMERGENCY MEDICINE | Admitting: EMERGENCY MEDICINE

## 2019-03-27 DIAGNOSIS — Z86.73 HISTORY OF STROKE: ICD-10-CM
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
## 2019-03-27 DIAGNOSIS — M53.3 PAIN IN THE COCCYX: ICD-10-CM

## 2019-03-27 LAB
ANION GAP SERPL CALCULATED.3IONS-SCNC: 10 MMOL/L (ref 3–11)
BASOPHILS # BLD AUTO: 0.04 10*3/MM3 (ref 0–0.2)
BASOPHILS NFR BLD AUTO: 0.6 % (ref 0–1)
BUN BLD-MCNC: 22 MG/DL (ref 9–23)
BUN/CREAT SERPL: 27.5 (ref 7–25)
CALCIUM SPEC-SCNC: 10.5 MG/DL (ref 8.7–10.4)
CHLORIDE SERPL-SCNC: 107 MMOL/L (ref 99–109)
CO2 SERPL-SCNC: 26 MMOL/L (ref 20–31)
CREAT BLD-MCNC: 0.8 MG/DL (ref 0.6–1.3)
DEPRECATED RDW RBC AUTO: 48.1 FL (ref 37–54)
EOSINOPHIL # BLD AUTO: 0.11 10*3/MM3 (ref 0–0.3)
EOSINOPHIL NFR BLD AUTO: 1.5 % (ref 0–3)
ERYTHROCYTE [DISTWIDTH] IN BLOOD BY AUTOMATED COUNT: 14 % (ref 11.3–14.5)
GFR SERPL CREATININE-BSD FRML MDRD: 69 ML/MIN/1.73
GLUCOSE BLD-MCNC: 129 MG/DL (ref 70–100)
HCT VFR BLD AUTO: 42.6 % (ref 34.5–44)
HGB BLD-MCNC: 13.6 G/DL (ref 11.5–15.5)
IMM GRANULOCYTES # BLD AUTO: 0.02 10*3/MM3 (ref 0–0.05)
IMM GRANULOCYTES NFR BLD AUTO: 0.3 % (ref 0–0.6)
INR PPP: 1.08 (ref 0.85–1.16)
LYMPHOCYTES # BLD AUTO: 1.39 10*3/MM3 (ref 0.6–4.8)
LYMPHOCYTES NFR BLD AUTO: 19.4 % (ref 24–44)
MAGNESIUM SERPL-MCNC: 1.9 MG/DL (ref 1.3–2.7)
MCH RBC QN AUTO: 30.1 PG (ref 27–31)
MCHC RBC AUTO-ENTMCNC: 31.9 G/DL (ref 32–36)
MCV RBC AUTO: 94.2 FL (ref 80–99)
MONOCYTES # BLD AUTO: 0.59 10*3/MM3 (ref 0–1)
MONOCYTES NFR BLD AUTO: 8.3 % (ref 0–12)
NEUTROPHILS # BLD AUTO: 5.02 10*3/MM3 (ref 1.5–8.3)
NEUTROPHILS NFR BLD AUTO: 70.2 % (ref 41–71)
PLATELET # BLD AUTO: 166 10*3/MM3 (ref 150–450)
PMV BLD AUTO: 11.3 FL (ref 6–12)
POTASSIUM BLD-SCNC: 3.8 MMOL/L (ref 3.5–5.5)
PROTHROMBIN TIME: 13.5 SECONDS (ref 11.2–14.3)
RBC # BLD AUTO: 4.52 10*6/MM3 (ref 3.89–5.14)
SODIUM BLD-SCNC: 143 MMOL/L (ref 132–146)
WBC NRBC COR # BLD: 7.15 10*3/MM3 (ref 3.5–10.8)

## 2019-03-27 PROCEDURE — 80048 BASIC METABOLIC PNL TOTAL CA: CPT | Performed by: EMERGENCY MEDICINE

## 2019-03-27 PROCEDURE — 85610 PROTHROMBIN TIME: CPT | Performed by: EMERGENCY MEDICINE

## 2019-03-27 PROCEDURE — 83735 ASSAY OF MAGNESIUM: CPT | Performed by: EMERGENCY MEDICINE

## 2019-03-27 PROCEDURE — 70450 CT HEAD/BRAIN W/O DYE: CPT

## 2019-03-27 PROCEDURE — 72192 CT PELVIS W/O DYE: CPT

## 2019-03-27 PROCEDURE — 82550 ASSAY OF CK (CPK): CPT | Performed by: EMERGENCY MEDICINE

## 2019-03-27 PROCEDURE — 71045 X-RAY EXAM CHEST 1 VIEW: CPT

## 2019-03-27 PROCEDURE — 99284 EMERGENCY DEPT VISIT MOD MDM: CPT

## 2019-03-27 PROCEDURE — 72125 CT NECK SPINE W/O DYE: CPT

## 2019-03-27 PROCEDURE — 93005 ELECTROCARDIOGRAM TRACING: CPT | Performed by: EMERGENCY MEDICINE

## 2019-03-27 PROCEDURE — 85025 COMPLETE CBC W/AUTO DIFF WBC: CPT | Performed by: EMERGENCY MEDICINE

## 2019-03-27 RX ORDER — FENTANYL CITRATE 50 UG/ML
50 INJECTION, SOLUTION INTRAMUSCULAR; INTRAVENOUS ONCE
Status: DISCONTINUED | OUTPATIENT
Start: 2019-03-27 | End: 2019-03-28 | Stop reason: HOSPADM

## 2019-03-28 VITALS
RESPIRATION RATE: 15 BRPM | SYSTOLIC BLOOD PRESSURE: 126 MMHG | BODY MASS INDEX: 25.72 KG/M2 | TEMPERATURE: 98.5 F | OXYGEN SATURATION: 95 % | HEIGHT: 66 IN | HEART RATE: 74 BPM | WEIGHT: 160.05 LBS | DIASTOLIC BLOOD PRESSURE: 59 MMHG

## 2019-03-28 LAB
BACTERIA UR QL AUTO: ABNORMAL /HPF
BILIRUB UR QL STRIP: NEGATIVE
CK SERPL-CCNC: 123 U/L (ref 26–174)
CLARITY UR: CLEAR
COLOR UR: ABNORMAL
GLUCOSE UR STRIP-MCNC: NEGATIVE MG/DL
HGB UR QL STRIP.AUTO: ABNORMAL
HYALINE CASTS UR QL AUTO: ABNORMAL /LPF
KETONES UR QL STRIP: ABNORMAL
LEUKOCYTE ESTERASE UR QL STRIP.AUTO: ABNORMAL
NITRITE UR QL STRIP: NEGATIVE
PH UR STRIP.AUTO: 5.5 [PH] (ref 5–8)
PROT UR QL STRIP: NEGATIVE
RBC # UR: ABNORMAL /HPF
REF LAB TEST METHOD: ABNORMAL
SP GR UR STRIP: >=1.03 (ref 1–1.03)
SQUAMOUS #/AREA URNS HPF: ABNORMAL /HPF
UROBILINOGEN UR QL STRIP: ABNORMAL
WBC UR QL AUTO: ABNORMAL /HPF

## 2019-03-28 PROCEDURE — 81001 URINALYSIS AUTO W/SCOPE: CPT | Performed by: EMERGENCY MEDICINE

## 2019-03-28 RX ORDER — LISINOPRIL 5 MG/1
5 TABLET ORAL DAILY
COMMUNITY

## 2019-03-28 RX ORDER — HYDROCODONE BITARTRATE AND ACETAMINOPHEN 5; 325 MG/1; MG/1
1 TABLET ORAL ONCE
Status: COMPLETED | OUTPATIENT
Start: 2019-03-28 | End: 2019-03-28

## 2019-03-28 RX ORDER — TORSEMIDE 10 MG/1
5 TABLET ORAL DAILY
COMMUNITY

## 2019-03-28 RX ADMIN — SODIUM CHLORIDE 500 ML: 9 INJECTION, SOLUTION INTRAVENOUS at 00:30

## 2019-03-28 RX ADMIN — HYDROCODONE BITARTRATE AND ACETAMINOPHEN 1 TABLET: 5; 325 TABLET ORAL at 02:26

## 2019-04-06 ENCOUNTER — APPOINTMENT (OUTPATIENT)
Dept: CT IMAGING | Facility: HOSPITAL | Age: 84
End: 2019-04-06

## 2019-04-06 ENCOUNTER — HOSPITAL ENCOUNTER (INPATIENT)
Facility: HOSPITAL | Age: 84
LOS: 5 days | Discharge: HOSPICE | End: 2019-04-11
Attending: EMERGENCY MEDICINE | Admitting: HOSPITALIST

## 2019-04-06 ENCOUNTER — APPOINTMENT (OUTPATIENT)
Dept: GENERAL RADIOLOGY | Facility: HOSPITAL | Age: 84
End: 2019-04-06

## 2019-04-06 DIAGNOSIS — R13.10 DYSPHAGIA, UNSPECIFIED TYPE: ICD-10-CM

## 2019-04-06 DIAGNOSIS — G12.21 ALS (AMYOTROPHIC LATERAL SCLEROSIS) (HCC): ICD-10-CM

## 2019-04-06 DIAGNOSIS — R13.12 OROPHARYNGEAL DYSPHAGIA: ICD-10-CM

## 2019-04-06 DIAGNOSIS — J69.0 ASPIRATION PNEUMONIA, UNSPECIFIED ASPIRATION PNEUMONIA TYPE, UNSPECIFIED LATERALITY, UNSPECIFIED PART OF LUNG (HCC): Primary | ICD-10-CM

## 2019-04-06 LAB
ALBUMIN SERPL-MCNC: 4.37 G/DL (ref 3.2–4.8)
ALBUMIN/GLOB SERPL: 1.9 G/DL (ref 1.5–2.5)
ALP SERPL-CCNC: 83 U/L (ref 25–100)
ALT SERPL W P-5'-P-CCNC: 15 U/L (ref 7–40)
ANION GAP SERPL CALCULATED.3IONS-SCNC: 13 MMOL/L (ref 3–11)
AST SERPL-CCNC: 25 U/L (ref 0–33)
BACTERIA UR QL AUTO: ABNORMAL /HPF
BASOPHILS # BLD AUTO: 0.02 10*3/MM3 (ref 0–0.2)
BASOPHILS NFR BLD AUTO: 0.3 % (ref 0–1)
BILIRUB SERPL-MCNC: 0.8 MG/DL (ref 0.3–1.2)
BILIRUB UR QL STRIP: NEGATIVE
BNP SERPL-MCNC: 50 PG/ML (ref 0–100)
BUN BLD-MCNC: 18 MG/DL (ref 9–23)
BUN/CREAT SERPL: 24.3 (ref 7–25)
CALCIUM SPEC-SCNC: 10.8 MG/DL (ref 8.7–10.4)
CHLORIDE SERPL-SCNC: 106 MMOL/L (ref 99–109)
CLARITY UR: ABNORMAL
CO2 SERPL-SCNC: 25 MMOL/L (ref 20–31)
COLOR UR: YELLOW
CREAT BLD-MCNC: 0.74 MG/DL (ref 0.6–1.3)
D-LACTATE SERPL-SCNC: 1 MMOL/L (ref 0.5–2)
DEPRECATED RDW RBC AUTO: 47.4 FL (ref 37–54)
EOSINOPHIL # BLD AUTO: 0.02 10*3/MM3 (ref 0–0.3)
EOSINOPHIL NFR BLD AUTO: 0.3 % (ref 0–3)
ERYTHROCYTE [DISTWIDTH] IN BLOOD BY AUTOMATED COUNT: 13.8 % (ref 11.3–14.5)
GFR SERPL CREATININE-BSD FRML MDRD: 75 ML/MIN/1.73
GLOBULIN UR ELPH-MCNC: 2.3 GM/DL
GLUCOSE BLD-MCNC: 79 MG/DL (ref 70–100)
GLUCOSE UR STRIP-MCNC: NEGATIVE MG/DL
HCT VFR BLD AUTO: 43 % (ref 34.5–44)
HGB BLD-MCNC: 14.1 G/DL (ref 11.5–15.5)
HGB UR QL STRIP.AUTO: ABNORMAL
HOLD SPECIMEN: NORMAL
HOLD SPECIMEN: NORMAL
HYALINE CASTS UR QL AUTO: ABNORMAL /LPF
IMM GRANULOCYTES # BLD AUTO: 0.01 10*3/MM3 (ref 0–0.05)
IMM GRANULOCYTES NFR BLD AUTO: 0.2 % (ref 0–0.6)
KETONES UR QL STRIP: ABNORMAL
LEUKOCYTE ESTERASE UR QL STRIP.AUTO: ABNORMAL
LYMPHOCYTES # BLD AUTO: 0.74 10*3/MM3 (ref 0.6–4.8)
LYMPHOCYTES NFR BLD AUTO: 12.6 % (ref 24–44)
MCH RBC QN AUTO: 30.5 PG (ref 27–31)
MCHC RBC AUTO-ENTMCNC: 32.8 G/DL (ref 32–36)
MCV RBC AUTO: 93.1 FL (ref 80–99)
MONOCYTES # BLD AUTO: 0.62 10*3/MM3 (ref 0–1)
MONOCYTES NFR BLD AUTO: 10.6 % (ref 0–12)
NEUTROPHILS # BLD AUTO: 4.46 10*3/MM3 (ref 1.5–8.3)
NEUTROPHILS NFR BLD AUTO: 76 % (ref 41–71)
NITRITE UR QL STRIP: NEGATIVE
PH UR STRIP.AUTO: <=5 [PH] (ref 5–8)
PLATELET # BLD AUTO: 149 10*3/MM3 (ref 150–450)
PMV BLD AUTO: 10.3 FL (ref 6–12)
POTASSIUM BLD-SCNC: 3.6 MMOL/L (ref 3.5–5.5)
PROCALCITONIN SERPL-MCNC: 0.38 NG/ML
PROT SERPL-MCNC: 6.7 G/DL (ref 5.7–8.2)
PROT UR QL STRIP: NEGATIVE
RBC # BLD AUTO: 4.62 10*6/MM3 (ref 3.89–5.14)
RBC # UR: ABNORMAL /HPF
REF LAB TEST METHOD: ABNORMAL
SODIUM BLD-SCNC: 144 MMOL/L (ref 132–146)
SP GR UR STRIP: 1.02 (ref 1–1.03)
SQUAMOUS #/AREA URNS HPF: ABNORMAL /HPF
TROPONIN I SERPL-MCNC: 0 NG/ML (ref 0–0.07)
UROBILINOGEN UR QL STRIP: ABNORMAL
WBC NRBC COR # BLD: 5.87 10*3/MM3 (ref 3.5–10.8)
WBC UR QL AUTO: ABNORMAL /HPF
WHOLE BLOOD HOLD SPECIMEN: NORMAL
WHOLE BLOOD HOLD SPECIMEN: NORMAL

## 2019-04-06 PROCEDURE — 99284 EMERGENCY DEPT VISIT MOD MDM: CPT

## 2019-04-06 PROCEDURE — 87040 BLOOD CULTURE FOR BACTERIA: CPT | Performed by: NURSE PRACTITIONER

## 2019-04-06 PROCEDURE — 71045 X-RAY EXAM CHEST 1 VIEW: CPT

## 2019-04-06 PROCEDURE — 94640 AIRWAY INHALATION TREATMENT: CPT

## 2019-04-06 PROCEDURE — 25010000002 PIPERACILLIN SOD-TAZOBACTAM PER 1 G: Performed by: INTERNAL MEDICINE

## 2019-04-06 PROCEDURE — 84145 PROCALCITONIN (PCT): CPT | Performed by: NURSE PRACTITIONER

## 2019-04-06 PROCEDURE — 83880 ASSAY OF NATRIURETIC PEPTIDE: CPT | Performed by: EMERGENCY MEDICINE

## 2019-04-06 PROCEDURE — 81001 URINALYSIS AUTO W/SCOPE: CPT | Performed by: NURSE PRACTITIONER

## 2019-04-06 PROCEDURE — 71250 CT THORAX DX C-: CPT

## 2019-04-06 PROCEDURE — 83605 ASSAY OF LACTIC ACID: CPT | Performed by: NURSE PRACTITIONER

## 2019-04-06 PROCEDURE — 93005 ELECTROCARDIOGRAM TRACING: CPT | Performed by: EMERGENCY MEDICINE

## 2019-04-06 PROCEDURE — 85025 COMPLETE CBC W/AUTO DIFF WBC: CPT | Performed by: EMERGENCY MEDICINE

## 2019-04-06 PROCEDURE — 92610 EVALUATE SWALLOWING FUNCTION: CPT

## 2019-04-06 PROCEDURE — 25010000002 PIPERACILLIN SOD-TAZOBACTAM PER 1 G: Performed by: NURSE PRACTITIONER

## 2019-04-06 PROCEDURE — 25010000002 HEPARIN (PORCINE) PER 1000 UNITS: Performed by: INTERNAL MEDICINE

## 2019-04-06 PROCEDURE — 99223 1ST HOSP IP/OBS HIGH 75: CPT | Performed by: INTERNAL MEDICINE

## 2019-04-06 PROCEDURE — 84484 ASSAY OF TROPONIN QUANT: CPT

## 2019-04-06 PROCEDURE — 80053 COMPREHEN METABOLIC PANEL: CPT | Performed by: EMERGENCY MEDICINE

## 2019-04-06 RX ORDER — MORPHINE SULFATE 4 MG/ML
1 INJECTION, SOLUTION INTRAMUSCULAR; INTRAVENOUS EVERY 4 HOURS PRN
Status: DISCONTINUED | OUTPATIENT
Start: 2019-04-06 | End: 2019-04-11 | Stop reason: HOSPADM

## 2019-04-06 RX ORDER — IPRATROPIUM BROMIDE AND ALBUTEROL SULFATE 2.5; .5 MG/3ML; MG/3ML
3 SOLUTION RESPIRATORY (INHALATION) ONCE
Status: COMPLETED | OUTPATIENT
Start: 2019-04-06 | End: 2019-04-06

## 2019-04-06 RX ORDER — TORSEMIDE 10 MG/1
5 TABLET ORAL DAILY
Status: DISCONTINUED | OUTPATIENT
Start: 2019-04-06 | End: 2019-04-11 | Stop reason: HOSPADM

## 2019-04-06 RX ORDER — ATORVASTATIN CALCIUM 10 MG/1
10 TABLET, FILM COATED ORAL NIGHTLY
Status: DISCONTINUED | OUTPATIENT
Start: 2019-04-06 | End: 2019-04-11 | Stop reason: HOSPADM

## 2019-04-06 RX ORDER — ACEBUTOLOL HYDROCHLORIDE 200 MG/1
200 CAPSULE ORAL
Status: DISCONTINUED | OUTPATIENT
Start: 2019-04-06 | End: 2019-04-11 | Stop reason: HOSPADM

## 2019-04-06 RX ORDER — NALOXONE HCL 0.4 MG/ML
0.4 VIAL (ML) INJECTION
Status: DISCONTINUED | OUTPATIENT
Start: 2019-04-06 | End: 2019-04-11 | Stop reason: HOSPADM

## 2019-04-06 RX ORDER — LISINOPRIL 5 MG/1
5 TABLET ORAL DAILY
Status: DISCONTINUED | OUTPATIENT
Start: 2019-04-06 | End: 2019-04-11 | Stop reason: HOSPADM

## 2019-04-06 RX ORDER — SODIUM CHLORIDE 0.9 % (FLUSH) 0.9 %
3 SYRINGE (ML) INJECTION EVERY 12 HOURS SCHEDULED
Status: DISCONTINUED | OUTPATIENT
Start: 2019-04-06 | End: 2019-04-11 | Stop reason: HOSPADM

## 2019-04-06 RX ORDER — SODIUM CHLORIDE 0.9 % (FLUSH) 0.9 %
3-10 SYRINGE (ML) INJECTION AS NEEDED
Status: DISCONTINUED | OUTPATIENT
Start: 2019-04-06 | End: 2019-04-11 | Stop reason: HOSPADM

## 2019-04-06 RX ORDER — SODIUM CHLORIDE 0.9 % (FLUSH) 0.9 %
10 SYRINGE (ML) INJECTION AS NEEDED
Status: DISCONTINUED | OUTPATIENT
Start: 2019-04-06 | End: 2019-04-11 | Stop reason: HOSPADM

## 2019-04-06 RX ORDER — HEPARIN SODIUM 5000 [USP'U]/ML
5000 INJECTION, SOLUTION INTRAVENOUS; SUBCUTANEOUS EVERY 12 HOURS SCHEDULED
Status: DISCONTINUED | OUTPATIENT
Start: 2019-04-06 | End: 2019-04-11 | Stop reason: HOSPADM

## 2019-04-06 RX ADMIN — HEPARIN SODIUM 5000 UNITS: 5000 INJECTION INTRAVENOUS; SUBCUTANEOUS at 20:16

## 2019-04-06 RX ADMIN — TAZOBACTAM SODIUM AND PIPERACILLIN SODIUM 4.5 G: 500; 4 INJECTION, SOLUTION INTRAVENOUS at 20:20

## 2019-04-06 RX ADMIN — HEPARIN SODIUM 5000 UNITS: 5000 INJECTION INTRAVENOUS; SUBCUTANEOUS at 18:17

## 2019-04-06 RX ADMIN — TAZOBACTAM SODIUM AND PIPERACILLIN SODIUM 3.38 G: 375; 3 INJECTION, SOLUTION INTRAVENOUS at 13:47

## 2019-04-06 RX ADMIN — SODIUM CHLORIDE, PRESERVATIVE FREE 10 ML: 5 INJECTION INTRAVENOUS at 18:18

## 2019-04-06 RX ADMIN — IPRATROPIUM BROMIDE AND ALBUTEROL SULFATE 3 ML: 2.5; .5 SOLUTION RESPIRATORY (INHALATION) at 11:58

## 2019-04-06 RX ADMIN — SODIUM CHLORIDE, PRESERVATIVE FREE 3 ML: 5 INJECTION INTRAVENOUS at 20:17

## 2019-04-06 NOTE — ED PROVIDER NOTES
Subjective   Omayra Bhardwaj is an 83 y.o.female who was brought to the emergency department by her daughter because she has been coughing and short of breath since yesterday. She also has some chest congestion but has been unable to cough up any sputum. 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 No other acute complaints reported at this time.        History provided by:  Patient and relative  Shortness of Breath   Severity:  Moderate  Onset quality:  Sudden  Duration:  2 days  Timing:  Constant  Progression:  Unchanged  Chronicity:  New  Relieved by:  None tried  Worsened by:  Nothing  Ineffective treatments:  None tried  Associated symptoms: cough    Associated symptoms: no sputum production        Review of Systems   HENT: Positive for congestion and trouble swallowing.    Respiratory: Positive for cough and shortness of breath. Negative for sputum production.    All other systems reviewed and are negative.      Past Medical History:   Diagnosis Date   • Chest pain 05/2015    LEXISCAN STRESS TEST    • Chronic hypertension     probably essential.     • Chronic lymphedema     with probable chronic lower extremity venous insufficiency with recent acceptable bilateral lower extremity venous duplex study, March 2013.   • Depression    • Disordered sleep     with prominent snoring and occasional nocturnal hypersomnolence.    • Dyslipidemia      untreated.   • Former tobacco use    • GERD (gastroesophageal reflux disease)    • History of obesity     (BMI 29.5).   • History of obesity     (BMI 29.5).   • Hypercalcemia 2010   • Hyperlipidemia    • Hyperparathyroidism (CMS/[facility])     with contemplated parathyroidectomy (Dr. Davis, [facility]).   • Hyperparathyroidism (CMS/HCC) 2013    HYPERPARATHYROID- BENIGN BIOPSY 3 THYROID NODULES- WATCHFUL WAITING- DR. DIANE JON AT     • Hypertension    • Hypertensive cardiovascular disease      probable   • Obesity, Class I, BMI 30-34.9 2019   • Rib pain on left side 2017    RIB PAIN ON LEFT SIDE    • Skin cancer 2010    SQAMOUS CELL SKIN CANCER- RESECTED    • Staph infection     L FINGER AFTER STICTCHES REMOVED    • Subarachnoid hemorrhage, traumatic (CMS/HCC) 07/15/2017    SUPPORTIVE CARE IN ICU, THEN HOME HEALTH WITH PT AND OT   • Syncope 2018   • Thyroid nodule     workup pending.   • UTI (urinary tract infection) 7/15/2017       No Known Allergies    Past Surgical History:   Procedure Laterality Date   • APPENDECTOMY     • BREAST BIOPSY Left    • BREAST EXCISIONAL BIOPSY Left    • CATARACT EXTRACTION      bilateral    • OTHER SURGICAL HISTORY      Laparoscopy   • PARATHYROIDECTOMY     • SQUAMOUS CELL CARCINOMA EXCISION     • TONSILLECTOMY         Family History   Problem Relation Age of Onset   • Cancer Mother         THROAT CANCER    • Alcohol abuse Mother    • Alcohol abuse Father    • Heart disease Brother    • Heart failure Brother    • Cancer Brother         THROAT CANCER    • Alcohol abuse Brother    • Colon polyps Brother    • Coronary artery disease Brother         PREMATURE   • Skin cancer Brother         MELANOMA   • Hypertension Other    • Obesity Other    • Alcohol abuse Son    • Coronary artery disease Maternal Uncle    • Hypertension Maternal Grandfather    • Coronary artery disease Maternal Grandfather    • Cancer Paternal Grandmother         CARCINOMATOSIS ABDOMEN    • Breast cancer Neg Hx    • Ovarian cancer Neg Hx        Social History     Socioeconomic History   • Marital status:      Spouse name: Not on file   • Number of children: Not on file   • Years of education: Not on file   • Highest education level: Not on file   Tobacco Use   • Smoking status: Former Smoker     Types: Cigarettes     Last attempt to quit: 1963     Years since quittin.2   • Smokeless tobacco: Never Used   Substance and Sexual Activity   • Alcohol use: No    • Drug use: No   • Sexual activity: Defer         Objective   Physical Exam   Constitutional: She is oriented to person, place, and time. She appears well-developed and well-nourished. No distress.   HENT:   Head: Normocephalic and atraumatic.   Mouth/Throat: Oropharynx is clear and moist.   Eyes: Conjunctivae are normal. No scleral icterus.   Neck: Normal range of motion. Neck supple.   Cardiovascular: Normal rate, regular rhythm and normal heart sounds.   No murmur heard.  Pulmonary/Chest: Effort normal. No respiratory distress. She has wheezes. She has rhonchi.   Thick deep cough, not very productive. Diffuse rhonchi. Thick wheezing.   Abdominal: Soft. There is no tenderness.   Musculoskeletal: She exhibits no edema.   Neurological: She is alert and oriented to person, place, and time.   Skin: Skin is warm and dry. No rash noted. No erythema.   Psychiatric: She has a normal mood and affect. Her behavior is normal.   Nursing note and vitals reviewed.      Procedures         ED Course  ED Course as of Apr 06 1419   Sat Apr 06, 2019   1330 Hospitalist paged  []   [id number] I spoke with Dr. Arias in person and he will admit  [JADEN]      ED Course User Index  [JADEN] Yazan Pugh APRN     Recent Results (from the past 24 hour(s))   Comprehensive Metabolic Panel    Collection Time: 04/06/19 11:46 AM   Result Value Ref Range    Glucose 79 70 - 100 mg/dL    BUN 18 9 - 23 mg/dL    Creatinine 0.74 0.60 - 1.30 mg/dL    Sodium 144 132 - 146 mmol/L    Potassium 3.6 3.5 - 5.5 mmol/L    Chloride 106 99 - 109 mmol/L    CO2 25.0 20.0 - 31.0 mmol/L    Calcium 10.8 (H) 8.7 - 10.4 mg/dL    Total Protein 6.7 5.7 - 8.2 g/dL    Albumin 4.37 3.20 - 4.80 g/dL    ALT (SGPT) 15 7 - 40 U/L    AST (SGOT) 25 0 - 33 U/L    Alkaline Phosphatase 83 25 - 100 U/L    Total Bilirubin 0.8 0.3 - 1.2 mg/dL    eGFR Non African Amer 75 >60 mL/min/1.73    Globulin 2.3 gm/dL    A/G Ratio 1.9 1.5 - 2.5 g/dL    BUN/Creatinine Ratio 24.3 7.0 - 25.0    Anion Gap  13.0 (H) 3.0 - 11.0 mmol/L   BNP    Collection Time: 04/06/19 11:46 AM   Result Value Ref Range    BNP 50.0 0.0 - 100.0 pg/mL   Light Blue Top    Collection Time: 04/06/19 11:46 AM   Result Value Ref Range    Extra Tube hold for add-on    Green Top (Gel)    Collection Time: 04/06/19 11:46 AM   Result Value Ref Range    Extra Tube Hold for add-ons.    Lavender Top    Collection Time: 04/06/19 11:46 AM   Result Value Ref Range    Extra Tube hold for add-on    Gold Top - SST    Collection Time: 04/06/19 11:46 AM   Result Value Ref Range    Extra Tube Hold for add-ons.    CBC Auto Differential    Collection Time: 04/06/19 11:46 AM   Result Value Ref Range    WBC 5.87 3.50 - 10.80 10*3/mm3    RBC 4.62 3.89 - 5.14 10*6/mm3    Hemoglobin 14.1 11.5 - 15.5 g/dL    Hematocrit 43.0 34.5 - 44.0 %    MCV 93.1 80.0 - 99.0 fL    MCH 30.5 27.0 - 31.0 pg    MCHC 32.8 32.0 - 36.0 g/dL    RDW 13.8 11.3 - 14.5 %    RDW-SD 47.4 37.0 - 54.0 fl    MPV 10.3 6.0 - 12.0 fL    Platelets 149 (L) 150 - 450 10*3/mm3    Neutrophil % 76.0 (H) 41.0 - 71.0 %    Lymphocyte % 12.6 (L) 24.0 - 44.0 %    Monocyte % 10.6 0.0 - 12.0 %    Eosinophil % 0.3 0.0 - 3.0 %    Basophil % 0.3 0.0 - 1.0 %    Immature Grans % 0.2 0.0 - 0.6 %    Neutrophils, Absolute 4.46 1.50 - 8.30 10*3/mm3    Lymphocytes, Absolute 0.74 0.60 - 4.80 10*3/mm3    Monocytes, Absolute 0.62 0.00 - 1.00 10*3/mm3    Eosinophils, Absolute 0.02 0.00 - 0.30 10*3/mm3    Basophils, Absolute 0.02 0.00 - 0.20 10*3/mm3    Immature Grans, Absolute 0.01 0.00 - 0.05 10*3/mm3   Procalcitonin    Collection Time: 04/06/19 11:46 AM   Result Value Ref Range    Procalcitonin 0.38 (H) <=0.25 ng/mL   Lactic Acid, Plasma    Collection Time: 04/06/19 11:46 AM   Result Value Ref Range    Lactate 1.0 0.5 - 2.0 mmol/L   POC Troponin, Rapid    Collection Time: 04/06/19 11:54 AM   Result Value Ref Range    Troponin I 0.00 0.00 - 0.07 ng/mL     Note: In addition to lab results from this visit, the labs listed above  may include labs taken at another facility or during a different encounter within the last 24 hours. Please correlate lab times with ED admission and discharge times for further clarification of the services performed during this visit.    XR Chest 1 View   Preliminary Result   Stable chest exam with mild chronic-appearing interstitial   changes and hiatal hernia. No acute chest disease is seen.       DICTATED:   04/06/2019   EDITED/ls :   04/06/2019               CT Chest Without Contrast   Preliminary Result   Very mild posterior left lower lobe disease, new from prior   study, possibly early changes of pneumonia or bronchitis, or in a   patient of this age, mild changes of aspiration. No other evidence of   active chest disease elsewhere.       DICTATED:   04/06/2019   EDITED/ls :   04/06/2019             Vitals:    04/06/19 1230 04/06/19 1231 04/06/19 1330 04/06/19 1345   BP: 129/77  140/70    BP Location:       Patient Position:       Pulse:  81  75   Resp:       Temp:       TempSrc:       SpO2:  94% 93% 93%   Weight:       Height:         Medications   sodium chloride 0.9 % flush 10 mL (not administered)   sodium chloride 0.9 % flush 10 mL (not administered)   ipratropium-albuterol (DUO-NEB) nebulizer solution 3 mL (3 mL Nebulization Given 4/6/19 1158)   piperacillin-tazobactam (ZOSYN) 3.375 g in iso-osmotic dextrose 50 ml (premix) (3.375 g Intravenous New Bag 4/6/19 1347)     ECG/EMG Results (last 24 hours)     Procedure Component Value Units Date/Time    ECG 12 Lead [[id number]] Collected:  04/06/19 1121     Updated:  04/06/19 1126    Narrative:       Test Reason : SOA Protocol  Blood Pressure : **/** mmHG  Vent. Rate : 082 BPM     Atrial Rate : 082 BPM     P-R Int : 150 ms          QRS Dur : 090 ms      QT Int : 380 ms       P-R-T Axes : 046 -33 018 degrees     QTc Int : 443 ms    Normal sinus rhythm  Left axis deviation  Cannot rule out Anterior infarct (cited on or before 27-MAR-2019)  Abnormal ECG  When  compared with ECG of 27-MAR-2019 22:19,  No significant change was found  Confirmed by HE BAEZ MD ([id number]) on 4/6/2019 11:26:40 AM    Referred By:  RACHELLE           Confirmed By:HE BAEZ MD        ECG 12 Lead   Final Result   Test Reason : SOA Protocol   Blood Pressure : **/** mmHG   Vent. Rate : 082 BPM     Atrial Rate : 082 BPM      P-R Int : 150 ms          QRS Dur : 090 ms       QT Int : 380 ms       P-R-T Axes : 046 -33 018 degrees      QTc Int : 443 ms      Normal sinus rhythm   Left axis deviation   Cannot rule out Anterior infarct (cited on or before 27-MAR-2019)   Abnormal ECG   When compared with ECG of 27-MAR-2019 22:19,   No significant change was found   Confirmed by HE BAEZ MD ([id number]) on 4/6/2019 11:26:40 AM      Referred By:  RACHELLE           Confirmed By:HE BAEZ MD                         MDM    Final diagnoses:   Aspiration pneumonia, unspecified aspiration pneumonia type, unspecified laterality, unspecified part of lung (CMS/HCC)   ALS (amyotrophic lateral sclerosis) (CMS/HCC)   Dysphagia, unspecified type       Documentation assistance provided by lola Perez.  Information recorded by the scribe was done at my direction and has been verified and validated by me.     Joseline Perez  04/06/19 1206       Yazan Pugh APRN  04/06/19 1427

## 2019-04-06 NOTE — H&P
[facility] Medicine Services  HISTORY AND PHYSICAL    Patient Name: Omayra Bhardwaj  : 1935  MRN: [id number]  Primary Care Physician: Suzanna White MD  Date of admission: 2019      Subjective   Subjective     Chief Complaint:  Cough, SOB    HPI:  Omayra Bhardwaj is a 83 y.o. female with past medical history significant for hypertension, hyperlipidemia, multiple ALS for which patient follows with Dr. Lopez.  Patient is currently a resident of assisted living and can ambulate very little bit the help of a walker.  Patient's speech is severely impaired.  Patient also has dysphagia and aerated to the last evaluation was done about 8 -9 months ago.  However, patient still continues to have regular overall intake.  Family tells me that patient has had previous episodes of cough secondary to aspiration.  However since couple of days ago patient has had very vigorous cough and shortness of air out of proportion compared to previous episodes.  Prior to that evident the patient was in her usual state of health.  No fever or chills.  No chest pain, palpitation.  No nausea, vomiting, diarrhea, abdominal pain.  No headache or watery eyes or coryza.    Review of Systems       Otherwise complete ROS reviewed and is negative except as mentioned in the HPI.    Personal History     Past Medical History:   Diagnosis Date   • Chest pain 2015    LEXISCAN STRESS TEST    • Chronic hypertension     probably essential.     • Chronic lymphedema     with probable chronic lower extremity venous insufficiency with recent acceptable bilateral lower extremity venous duplex study, 2013.   • Depression    • Disordered sleep     with prominent snoring and occasional nocturnal hypersomnolence.    • Dyslipidemia      untreated.   • Former tobacco use    • GERD (gastroesophageal reflux disease)    • History of obesity     (BMI 29.5).   • History of obesity     (BMI 29.5).   • Hypercalcemia  2010   • Hyperlipidemia    • Hyperparathyroidism (CMS/HCC)     with contemplated parathyroidectomy (Dr. Davis, [facility]).   • Hyperparathyroidism (CMS/HCC) 2013    HYPERPARATHYROID- BENIGN BIOPSY 3 THYROID NODULES- WATCHFUL WAITING- DR. DIANE JON AT     • Hypertension    • Hypertensive cardiovascular disease     probable   • Obesity, Class I, BMI 30-34.9 1/17/2019   • Rib pain on left side 07/28/2017    RIB PAIN ON LEFT SIDE    • Skin cancer 2010    SQAMOUS CELL SKIN CANCER- RESECTED    • Staph infection     L FINGER AFTER STICTCHES REMOVED    • Subarachnoid hemorrhage, traumatic (CMS/HCC) 07/15/2017    SUPPORTIVE CARE IN ICU, THEN HOME HEALTH WITH PT AND OT   • Syncope 1/29/2018   • Thyroid nodule     workup pending.   • UTI (urinary tract infection) 7/15/2017       Past Surgical History:   Procedure Laterality Date   • APPENDECTOMY  1945   • BREAST BIOPSY Left    • BREAST EXCISIONAL BIOPSY Left    • CATARACT EXTRACTION  2010    bilateral    • OTHER SURGICAL HISTORY  1986    Laparoscopy   • PARATHYROIDECTOMY     • SQUAMOUS CELL CARCINOMA EXCISION  2010   • TONSILLECTOMY  1942       Family History: family history includes Alcohol abuse in her brother, father, mother, and son; Cancer in her brother, mother, and paternal grandmother; Colon polyps in her brother; Coronary artery disease in her brother, maternal grandfather, and maternal uncle; Heart disease in her brother; Heart failure in her brother; Hypertension in her maternal grandfather and other; Obesity in her other; Skin cancer in her brother. Otherwise pertinent FHx was reviewed and unremarkable.     Social History:  reports that she quit smoking about 56 years ago. Her smoking use included cigarettes. She has never used smokeless tobacco. She reports that she does not drink alcohol or use drugs.  Social History     Social History Narrative   • Not on file       Medications:    Available home medication information  reviewed.    (Not in a hospital admission)    No Known Allergies    Objective   Objective     Vital Signs:   Temp:  [99.5 °F (37.5 °C)] 99.5 °F (37.5 °C)  Heart Rate:  [] 75  Resp:  [20] 20  BP: (129-165)/(67-88) 140/70        Physical Exam   Constitutional: Awake, alert  Eyes: PERRLA, sclerae anicteric, no conjunctival injection  HENT: NCAT, mucous membranes moist  Neck: Muscle weakness of the neck and patient has difficulty holding her head up.  Respiratory: Very limited exam.  Harsh breath sounds bilaterally, cough on deep inspiration.  Nonlabored respirations   Cardiovascular: RRR, no murmurs, rubs, or gallops.  Gastrointestinal: Positive bowel sounds, soft, nontender, nondistended  Musculoskeletal: No bilateral ankle edema, no clubbing or cyanosis to extremities  Psychiatric: Appropriate affect, cooperative  Neurologic: His speech is severely impaired patient communicates with writing.  Patient is awake and alert and follows commands.  Severe weakness of neck and shoulder muscles.  Also weakness of lower extremities bilaterally.  Skin: No rashes  Results Reviewed:  I have personally reviewed current lab, radiology, and data and agree.    Results from last 7 days   Lab Units 04/06/19  1146   WBC 10*3/mm3 5.87   HEMOGLOBIN g/dL 14.1   HEMATOCRIT % 43.0   PLATELETS 10*3/mm3 149*     Results from last 7 days   Lab Units 04/06/19  1154 04/06/19  1146   SODIUM mmol/L  --  144   POTASSIUM mmol/L  --  3.6   CHLORIDE mmol/L  --  106   CO2 mmol/L  --  25.0   BUN mg/dL  --  18   CREATININE mg/dL  --  0.74   GLUCOSE mg/dL  --  79   CALCIUM mg/dL  --  10.8*   ALT (SGPT) U/L  --  15   AST (SGOT) U/L  --  25   TROPONIN I ng/mL 0.00  --      Estimated Creatinine Clearance: 52.1 mL/min (by C-G formula based on SCr of 0.74 mg/dL).  Brief Urine Lab Results  (Last result in the past 365 days)      Color   Clarity   Blood   Leuk Est   Nitrite   Protein   CREAT   Urine HCG        03/28/19 0117 Dark Yellow Clear Trace Trace  Negative Negative             BNP   Date Value Ref Range Status   04/06/2019 50.0 0.0 - 100.0 pg/mL Final     Comment:     Results may be falsely decreased if patient taking Biotin.     Imaging Results (last 24 hours)     Procedure Component Value Units Date/Time    XR Chest 1 View [[id number]] Collected:  04/06/19 1417     Updated:  04/06/19 1417    Narrative:          EXAMINATION: XR CHEST 1 VW - 04/06/2019     INDICATION: Shortness of air.     COMPARISON: 03/27/2019     FINDINGS: Heart is normal in size. Vasculature appears normal.  Coarsening of the basilar interstitial markings is unchanged from the  prior study, apparently chronic. Left shoulder joint DJD and hiatal  hernia are again noted.           Impression:       Stable chest exam with mild chronic-appearing interstitial  changes and hiatal hernia. No acute chest disease is seen.     DICTATED:   04/06/2019  EDITED/ls :   04/06/2019           CT Chest Without Contrast [[id number]] Collected:  04/06/19 1342     Updated:  04/06/19 1342    Narrative:       EXAMINATION: CT CHEST WO CONTRAST - 04/06/2019     INDICATION: Cough. Evaluate for pneumonia.     TECHNIQUE: Spiral acquisition 5 mm unenhanced images through the chest.     The radiation dose reduction device was turned on for each scan per the  ALARA (As Low as Reasonably Achievable) protocol.     COMPARISON: 07/15/2017 chest CT scan. Portable chest radiograph of  today's date.     FINDINGS: Lungs appear practically clear. In the left lower lobe, there  is some peribronchial thickening, perhaps a few opacified bronchi, and  minimal airspace disease which may represent a focal bronchitis or the  very earliest changes of pneumonia. No similar findings are seen  elsewhere. There is no pleural effusion. Mediastinal window images show  patient's large hiatal hernia. There is no evidence of significant  adenopathy and no pericardial effusion. Included images of the upper  abdomen show a bland-appearing left upper  pole renal cyst. Included  portions of the spleen, gallbladder, liver, pancreatic tail, adrenal  glands and right upper renal pole appear unremarkable.       Impression:       Very mild posterior left lower lobe disease, new from prior  study, possibly early changes of pneumonia or bronchitis, or in a  patient of this age, mild changes of aspiration. No other evidence of  active chest disease elsewhere.     DICTATED:   04/06/2019  EDITED/ls :   04/06/2019            Results for orders placed during the hospital encounter of 07/15/17   Adult Transthoracic Echo Complete    Narrative · Left ventricular systolic function is normal. Estimated EF = 70%.  · Left ventricular diastolic dysfunction (grade I) consistent with   impaired relaxation.  · Right ventricular cavity is mildly dilated.  · Normal right ventricular wall thickness, systolic function and septal   motion noted with right ventricular cavity mildly dilated.  · No evidence of pulmonary hypertension is present.  · There is no evidence of pericardial effusion.  · No significant structural valvular abnormality demonstrated.          Assessment/Plan   Assessment / Plan     Active Hospital Problems    Diagnosis POA   • Aspiration pneumonia (CMS/HCC) [J69.0] Yes     *Shortness of breath and cough for couple of days.  No fever or chills.  CT scan of the long without contrast shows evidence of lower lobe infiltrate possibly aspiration.  So the symptoms are possibly secondary to early aspiration pneumonia.    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    *Dysphagia    * HTN    * HLP.  PLAN:  -Admit the patient to telemetry.  -Continue Zosyn  -Home medication  -Swallow study tomorrow  -If it becomes necessary will ask neurology to see the patient  -Heparin for DVT.    DVT prophylaxis:  Heparin.  CODE STATUS:    Code Status and Medical Interventions:   Ordered at: 04/06/19 1441     Code Status:    CPR     Medical Interventions (Level of Support Prior to Arrest):     Full       Admission Status: inpatinet    Electronically signed by Carlo Arias MD, 04/06/19, 2:46 PM.

## 2019-04-06 NOTE — PLAN OF CARE
Problem: Patient Care Overview  Goal: Plan of Care Review  Outcome: Ongoing (interventions implemented as appropriate)   04/06/19 4130   Coping/Psychosocial   Plan of Care Reviewed With patient;family   Plan of Care Review   Progress declining   SLP clinical dysphagia evaluation completed. Will address suspected severe oropharyngeal dysphagia w/ f/u for GOC decisions and further education for pt/family. Pt has expressed that she does not wish for tube feeding. Spoke to MD who consulted palliative care. Family also requested hospice consult. Please see note for further details and recommendations.

## 2019-04-06 NOTE — PLAN OF CARE
Problem: Patient Care Overview  Goal: Plan of Care Review  Outcome: Ongoing (interventions implemented as appropriate)   04/06/19 7987   Coping/Psychosocial   Plan of Care Reviewed With family;patient   Plan of Care Review   Progress no change   OTHER   Outcome Summary admitted to room, vss, tele NSR, unable to understand her verbally, communicates with written request, unable to manage secretions, NPO, per SLP recommendations

## 2019-04-06 NOTE — THERAPY EVALUATION
Acute Care - Speech Language Pathology   Swallow Initial Evaluation [facility]   Clinical Swallow Evaluation     Patient Name: Omayra Bhardwaj  : 1935  MRN: [id number]  Today's Date: 2019               Admit Date: 2019    Visit Dx:     ICD-10-CM ICD-9-CM   1. Aspiration pneumonia, unspecified aspiration pneumonia type, unspecified laterality, unspecified part of lung (CMS/[facility]) J69.0 507.0   2. ALS (amyotrophic lateral sclerosis) (CMS/[facility]) G12.21 335.20   3. Dysphagia, unspecified type R13.10 787.20   4. Oropharyngeal dysphagia R13.12 787.22     Patient Active Problem List   Diagnosis   • Hypertensive cardiovascular disease   • Mild episode of recurrent major depressive disorder (CMS/[facility])   • Chronic acquired lymphedema   • Thyroid nodule   • Hyperparathyroidism, primary (CMS/[facility])   • Disordered sleep   • Gastroesophageal reflux disease without esophagitis   • Difficulty with speech   • Allergic rhinitis   • Benign essential hypertension   • Dysphagia as late effect of cerebrovascular accident (CVA)   • Hypercholesterolemia   • Adhesive capsulitis of left shoulder   • Chronic diastolic CHF (congestive heart failure), NYHA class 2 (CMS/[facility])   • Bilateral edema of lower extremity   • Mild neurocognitive disorder   • Senile osteoporosis   • Progressive bulbar palsy (CMS/[facility])   • Risk for falls   • Multifactorial gait disorder   • Chronic kidney disease, stage 3 (moderate) (CMS/[facility])   • Chronic left shoulder pain   • Urge incontinence of urine   • Hypercalcemia   • Varicose veins of lower extremities without ulcer or inflammation   • Subarachnoid hemorrhage after traumatic injury without open intracranial wound, with prolonged loss of consciousness and return to pre-existing level of consciousness (CMS/[facility])   • Scaly patch rash   • Chronic pain of both knees   • Chronic bilateral low back pain without sciatica   • General weakness   • Urinary frequency   • Dysarthria   • Hypertensive heart and  chronic kidney disease stage 3 (CMS/HCC)   • Obesity   • Shoulder pain   • Varicose veins of both lower extremities   • B12 deficiency   • Aspiration pneumonia (CMS/HCC)     Past Medical History:   Diagnosis Date   • Chest pain 05/2015    LEXISCAN STRESS TEST    • Chronic hypertension     probably essential.     • Chronic lymphedema     with probable chronic lower extremity venous insufficiency with recent acceptable bilateral lower extremity venous duplex study, March 2013.   • Depression    • Disordered sleep     with prominent snoring and occasional nocturnal hypersomnolence.    • Dyslipidemia      untreated.   • Former tobacco use    • GERD (gastroesophageal reflux disease)    • History of obesity     (BMI 29.5).   • History of obesity     (BMI 29.5).   • Hypercalcemia 2010   • Hyperlipidemia    • Hyperparathyroidism (CMS/HCC)     with contemplated parathyroidectomy (Dr. Davis, [facility]).   • Hyperparathyroidism (CMS/HCC) 2013    HYPERPARATHYROID- BENIGN BIOPSY 3 THYROID NODULES- WATCHFUL WAITING- DR. DIANE JON AT     • Hypertension    • Hypertensive cardiovascular disease     probable   • Obesity, Class I, BMI 30-34.9 1/17/2019   • Rib pain on left side 07/28/2017    RIB PAIN ON LEFT SIDE    • Skin cancer 2010    SQAMOUS CELL SKIN CANCER- RESECTED    • Staph infection     L FINGER AFTER STICTCHES REMOVED    • Subarachnoid hemorrhage, traumatic (CMS/HCC) 07/15/2017    SUPPORTIVE CARE IN ICU, THEN HOME HEALTH WITH PT AND OT   • Syncope 1/29/2018   • Thyroid nodule     workup pending.   • UTI (urinary tract infection) 7/15/2017     Past Surgical History:   Procedure Laterality Date   • APPENDECTOMY  1945   • BREAST BIOPSY Left    • BREAST EXCISIONAL BIOPSY Left    • CATARACT EXTRACTION  2010    bilateral    • OTHER SURGICAL HISTORY  1986    Laparoscopy   • PARATHYROIDECTOMY     • SQUAMOUS CELL CARCINOMA EXCISION  2010   • TONSILLECTOMY  1942        SWALLOW EVALUATION (last 72  hours)      SLP Adult Swallow Evaluation     Row Name 04/06/19 3073                   Rehab Evaluation    Document Type  evaluation  -RD        Subjective Information  complains of;weakness  -RD        Patient Observations  alert;cooperative;agree to therapy  -RD        Patient/Family Observations  Daughters present  -RD        Patient Effort  good  -RD           General Information    Patient Profile Reviewed  yes  -RD        Pertinent History Of Current Problem  Adm w/ aspiration PNA. Bulbar ALS per MD note, but pt/family reports no official diagnosis. Pt supposed to be following up w/ Neurologist, Dr. Saini at the [facility]. Pt w/ progressive decline in speech and swallowing in less than 1 year. Pt w/ OP MBS 9/4/18 that revealed aspiration w/ thins and nectar-thick, unless tsp sized sips of nectar and pureed diet. Family reports that pt has not been consistently thickening liquids at home and that speech and swallowing has declined since 9/2018. Dtr reports pt w/ recent weight loss of ~ 20 lbs or greater since September. Pt has been unable to hold head upright 2' neck/muscle weakness and has a soft collar to use PRN.   -RD        Current Method of Nutrition  NPO  -RD        Precautions/Limitations, Vision  WFL with corrective lenses  -RD        Precautions/Limitations, Hearing  WFL;for purposes of eval  -RD        Prior Level of Function-Communication  motor speech impairment;other (see comments) severe dysarthria  -RD        Prior Level of Function-Swallowing  puree;nectar thick liquids;other (see comments) via tsp sized sips, not consistently drinking NT per family  -RD        Plans/Goals Discussed with  patient;family;agreed upon  -RD        Barriers to Rehab  medically complex;previous functional deficit  -RD        Patient's Goals for Discharge  eat/drink without coughing/choking;declined (alternate means of nutrition/hydration);comfort diet  -RD        Family Goals for Discharge  patient able  to eat/drink without coughing/choking;comfort diet;other (see comments) want palliative/hospice consult  -RD           Pain Assessment    Additional Documentation  Pain Scale: FACES Pre/Post-Treatment (Group)  -RD           Pain Scale: FACES Pre/Post-Treatment    Pain: FACES Scale, Pretreatment  2-->hurts little bit  -RD        Pain: FACES Scale, Post-Treatment  2-->hurts little bit  -RD        Pre/Post Treatment Pain Comment  Appears uncomfortable w/ secretions, but no overt signs of pain  -RD           Oral Motor and Function    Secretion Management  problems swallowing secretions;anterior loss;requires suctioning to control secretions;wet vocal quality  -RD        Mucosal Quality  sticky  -RD        Volitional Swallow  weak;unable to elicit  -RD        Volitional Cough  weak;reduced respiratory support  -RD           Oral Musculature and Cranial Nerve Assessment    Oral Motor General Assessment  generalized oral motor weakness;lingual impairment;velar impairment;vocal impairment  -RD        Lingual Impairment, Detail. Cranial Nerves IX, XII (Glossopharyngeal and Hypoglossal)  CN12: Motor Impairment;reduced lingual ROM;reduced strength;bilaterally  -RD        Velar Impairment, Detail, Cranial Nerves X, XI (Vagus and Accessory)  CN10/11: Motor Impairment;reduced velar strength  -RD        Vocal Impairment, Detail. Cranial Nerve X (Vagus)  CN10: Motor;impaired throat clear/cough (see comments);other (see comments) reduced resp support for cough/TC  -RD        Oral Motor, Comment  Pt w/ worsening dysarthria. 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 Strained/harsh vocal quality, breathy, monotone, hypernasality. Imprecise articulation.   -RD           General Eating/Swallowing Observations    Respiratory Support Currently in Use  room air  -RD        Eating/Swallowing Skills  fed by SLP  -RD        Positioning During Eating  upright in bed;contracted;other (see comments) difficulty holding up head/neck 2' wkns  -RD         Utensils Used  spoon  -RD        Consistencies Trialed  nectar/syrup-thick liquids;pureed DNT thins given baseline aspiration   -RD           Respiratory    Respiratory Status  increase in respiratory rate;during swallowing/eating  -RD           Clinical Swallow Eval    Oral Prep Phase  impaired  -RD        Oral Transit  impaired  -RD        Oral Residue  impaired  -RD        Pharyngeal Phase  suspected pharyngeal impairment  -RD        Clinical Swallow Evaluation Summary  Clinical dysphagia eval complete. Pt/family report progressively worsening dysphagia/dysarthria. Pt w/ recent OP MBS 9/4/18 that revealed aspiration risk w/ thin liquids and recs for nectar-thick via tsp sized sips and pureed diet. Family reports that pt was not consistently drinking thickened liquids at home. Family reports a weight loss of ~ 20 lbs recently as well as decreased speech intelligibility and worsening dysphagia. Pt presents w/ spastic/flaccid dysarthria (harsh/strained, breathy, monotone, hypernasal, imprecise articulation, etc.). Pt having to use notepad to write as speech is unintelligible. Family reports that pt has been seeing OP neurologist for bulbar symptoms and has been awaiting an appointment w/ Dr. Saini (neurologist) at [facility]. Pt/family have no confirmed diagnosis, but have been thinking ALS. Pt having difficulty managing secretions. Drooling. Set up suction kit to manage. Wet vocal quality at baseline. Trials given of nectar-thick via tsp and pureed. DNT thins/solid given baseline aspiration risk with these consistencies. Overt s/s of aspiration c/b incr'd wet vocal quality and coughing after all trials. Anterior loss of a majority of the bolus 2' oral/lingual wkns. Suspect severe oropharyngeal dysphagia. Discussed safest NPO w/ alternate nutrition. Pt has been expressing that she does not wish for tube feeding. Family would like palliative/hospice consult. SLP discussed s/s of aspiration, aspiration risk,  and comfort diet w/ pt/family. SLP will f/u for further POC decisions. Ag PARRISH who is in agreement w/ recs and ordered palliative care.   -RD           Oral Prep Concerns    Oral Prep Concerns  anterior loss;incomplete bolus preparation;incomplete or weak lip closure around spoon  -RD        Incomplete or Weak Lip Closure Around Spoon  nectar;pudding  -RD        Anterior Loss  nectar;pudding  -RD        Incomplete Bolus Preparation  nectar;pudding  -RD           Oral Transit Concerns    Oral Transit Concerns  delayed initiation of bolus transit;increased oral transit time  -RD        Delayed Intiation of Bolus Transit  nectar;pudding  -RD        Increased Oral Transit Time  nectar;pudding  -RD           Oral Residue Concerns    Oral Residue Concerns  diffuse residue throughout oral cavity  -RD        Diffuse Residue Throughout Oral Cavity  nectar;pudding  -RD        Oral Residue Concerns, Comment  Suctioned oral residue via yanker  -RD           Pharyngeal Phase Concerns    Pharyngeal Phase Concerns  wet vocal quality;cough  -RD        Wet Vocal Quality  nectar;pudding  -RD        Cough  nectar;pudding  -RD        Pharyngeal Phase Concerns, Comment  Suspect severe oropharyngeal dysphagia. Instrumental evaluation not indicated at this time. Pt/family requesting palliative/hospice consult. SLP will f/u for POC.   -RD           Clinical Impression    SLP Swallowing Diagnosis  mod-severe;oral dysfunction;suspected pharyngeal dysfunction  -RD        Functional Impact  risk of aspiration/pneumonia;risk of malnutrition;risk of dehydration  -RD        Rehab Potential/Prognosis, Swallowing  re-evaluate goals as necessary  -RD        Swallow Criteria for Skilled Therapeutic Interventions Met  demonstrates skilled criteria  -RD           Recommendations    Therapy Frequency (Swallow)  evaluation only;PRN  -RD        Predicted Duration Therapy Intervention (Days)  until discharge  -RD        SLP Diet Recommendation  NPO;long  term alternate methods of nutrition/hydration;other (see comments) if comfort diet: thins or nectar via tsp per pt and pureed   -RD        Recommended Diagnostics  other (see comments) f/u for POC decisions  -RD        Recommended Precautions and Strategies  other (see comments) Oral care/suctioning BID and PRN  -RD        SLP Rec. for Method of Medication Administration  meds via alternate route  -RD        Anticipated Dischage Disposition  unknown  -RD        Demonstrates Need for Referral to Another Service  other (see comments) Palliative care/hospice services (pt/family requested)  -RD          User Key  (r) = Recorded By, (t) = Taken By, (c) = Cosigned By    Initials Name Effective Dates    RD Yuliana Dugan, MS CCC-SLP 04/03/18 -           EDUCATION  The patient has been educated in the following areas:   Dysphagia (Swallowing Impairment) Oral Care/Hydration NPO rationale Modified Diet Instruction.    SLP Recommendation and Plan  SLP Swallowing Diagnosis: mod-severe, oral dysfunction, suspected pharyngeal dysfunction  SLP Diet Recommendation: NPO, long term alternate methods of nutrition/hydration, other (see comments)(if comfort diet: thins or nectar via tsp per pt and pureed )  Recommended Precautions and Strategies: other (see comments)(Oral care/suctioning BID and PRN)        Recommended Diagnostics: other (see comments)(f/u for POC decisions)  Swallow Criteria for Skilled Therapeutic Interventions Met: demonstrates skilled criteria  Anticipated Dischage Disposition: unknown  Rehab Potential/Prognosis, Swallowing: re-evaluate goals as necessary  Therapy Frequency (Swallow): evaluation only, PRN  Predicted Duration Therapy Intervention (Days): until discharge  Demonstrates Need for Referral to Another Service: other (see comments)(Palliative care/hospice services (pt/family requested))    Plan of Care Reviewed With: patient, family  Plan of Care Review  Plan of Care Reviewed With: patient,  family  Progress: declining         SLP Outcome Measures (last 72 hours)      SLP Outcome Measures     Row Name 04/06/19 1600             SLP Outcome Measures    Outcome Measure Used?  Adult NOMS  -RD         Adult FCM Scores    FCM Chosen  Swallowing  -RD      Swallowing FCM Score  1  -RD        User Key  (r) = Recorded By, (t) = Taken By, (c) = Cosigned By    Initials Name Effective Dates    Yuliana Lema MS CCC-SLP 04/03/18 -            Time Calculation:   Time Calculation- SLP     Row Name 04/06/19 1656             Time Calculation- SLP    SLP Start Time  1530  -RD      SLP Received On  04/06/19  -RD      SLP Goal Re-Cert Due Date  04/15/19  -RD        User Key  (r) = Recorded By, (t) = Taken By, (c) = Cosigned By    Initials Name Provider Type    Yuliana Lema MS CCC-SLP Speech and Language Pathologist          Therapy Charges for Today     Code Description Service Date Service Provider Modifiers Qty    [id number] HC ST EVAL ORAL PHARYNG SWALLOW 5 4/6/2019 Yuliana Dugan MS CCC-SLP GN 1               Yuliana Dugan MS CCC-SLP  4/6/2019

## 2019-04-07 LAB
BASOPHILS # BLD AUTO: 0.02 10*3/MM3 (ref 0–0.2)
BASOPHILS NFR BLD AUTO: 0.3 % (ref 0–1)
DEPRECATED RDW RBC AUTO: 47.8 FL (ref 37–54)
EOSINOPHIL # BLD AUTO: 0.01 10*3/MM3 (ref 0–0.3)
EOSINOPHIL NFR BLD AUTO: 0.1 % (ref 0–3)
ERYTHROCYTE [DISTWIDTH] IN BLOOD BY AUTOMATED COUNT: 13.7 % (ref 11.3–14.5)
HCT VFR BLD AUTO: 42.5 % (ref 34.5–44)
HGB BLD-MCNC: 13.6 G/DL (ref 11.5–15.5)
IMM GRANULOCYTES # BLD AUTO: 0.02 10*3/MM3 (ref 0–0.05)
IMM GRANULOCYTES NFR BLD AUTO: 0.3 % (ref 0–0.6)
LYMPHOCYTES # BLD AUTO: 1.48 10*3/MM3 (ref 0.6–4.8)
LYMPHOCYTES NFR BLD AUTO: 19 % (ref 24–44)
MAGNESIUM SERPL-MCNC: 1.9 MG/DL (ref 1.3–2.7)
MCH RBC QN AUTO: 30.5 PG (ref 27–31)
MCHC RBC AUTO-ENTMCNC: 32 G/DL (ref 32–36)
MCV RBC AUTO: 95.3 FL (ref 80–99)
MONOCYTES # BLD AUTO: 0.54 10*3/MM3 (ref 0–1)
MONOCYTES NFR BLD AUTO: 6.9 % (ref 0–12)
NEUTROPHILS # BLD AUTO: 5.74 10*3/MM3 (ref 1.5–8.3)
NEUTROPHILS NFR BLD AUTO: 73.4 % (ref 41–71)
PHOSPHATE SERPL-MCNC: 3 MG/DL (ref 2.4–5.1)
PLATELET # BLD AUTO: 126 10*3/MM3 (ref 150–450)
PMV BLD AUTO: 9.8 FL (ref 6–12)
RBC # BLD AUTO: 4.46 10*6/MM3 (ref 3.89–5.14)
WBC NRBC COR # BLD: 7.81 10*3/MM3 (ref 3.5–10.8)

## 2019-04-07 PROCEDURE — A9270 NON-COVERED ITEM OR SERVICE: HCPCS | Performed by: INTERNAL MEDICINE

## 2019-04-07 PROCEDURE — 63710000001 ACEBUTOLOL 200 MG CAPSULE: Performed by: INTERNAL MEDICINE

## 2019-04-07 PROCEDURE — 25010000002 PIPERACILLIN SOD-TAZOBACTAM PER 1 G: Performed by: INTERNAL MEDICINE

## 2019-04-07 PROCEDURE — 99233 SBSQ HOSP IP/OBS HIGH 50: CPT | Performed by: HOSPITALIST

## 2019-04-07 PROCEDURE — 63710000001 TORSEMIDE 10 MG TABLET: Performed by: INTERNAL MEDICINE

## 2019-04-07 PROCEDURE — 63710000001 LISINOPRIL 5 MG TABLET: Performed by: INTERNAL MEDICINE

## 2019-04-07 PROCEDURE — 84100 ASSAY OF PHOSPHORUS: CPT | Performed by: INTERNAL MEDICINE

## 2019-04-07 PROCEDURE — 25010000002 HEPARIN (PORCINE) PER 1000 UNITS: Performed by: INTERNAL MEDICINE

## 2019-04-07 PROCEDURE — 85025 COMPLETE CBC W/AUTO DIFF WBC: CPT | Performed by: INTERNAL MEDICINE

## 2019-04-07 PROCEDURE — 83735 ASSAY OF MAGNESIUM: CPT | Performed by: INTERNAL MEDICINE

## 2019-04-07 RX ADMIN — ACEBUTOLOL HYDROCHLORIDE 200 MG: 200 CAPSULE ORAL at 09:15

## 2019-04-07 RX ADMIN — TAZOBACTAM SODIUM AND PIPERACILLIN SODIUM 4.5 G: 500; 4 INJECTION, SOLUTION INTRAVENOUS at 05:26

## 2019-04-07 RX ADMIN — SODIUM CHLORIDE, PRESERVATIVE FREE 3 ML: 5 INJECTION INTRAVENOUS at 19:53

## 2019-04-07 RX ADMIN — TAZOBACTAM SODIUM AND PIPERACILLIN SODIUM 4.5 G: 500; 4 INJECTION, SOLUTION INTRAVENOUS at 19:54

## 2019-04-07 RX ADMIN — TAZOBACTAM SODIUM AND PIPERACILLIN SODIUM 4.5 G: 500; 4 INJECTION, SOLUTION INTRAVENOUS at 12:43

## 2019-04-07 RX ADMIN — HEPARIN SODIUM 5000 UNITS: 5000 INJECTION INTRAVENOUS; SUBCUTANEOUS at 09:15

## 2019-04-07 RX ADMIN — LISINOPRIL 5 MG: 5 TABLET ORAL at 09:15

## 2019-04-07 RX ADMIN — TORSEMIDE 5 MG: 10 TABLET ORAL at 09:15

## 2019-04-07 RX ADMIN — HEPARIN SODIUM 5000 UNITS: 5000 INJECTION INTRAVENOUS; SUBCUTANEOUS at 19:53

## 2019-04-07 NOTE — PROGRESS NOTES
Discharge Planning Assessment  [facility]     Patient Name: Omayra Bhardwaj  MRN: [id number]  Today's Date: 4/7/2019    Admit Date: 4/6/2019    Discharge Needs Assessment     Row Name 04/07/19 1400       Living Environment    Lives With  facility resident [facility]    Current Living Arrangements  independent/assisted living facility    Primary Care Provided by  self;other (see comments) facility staff    Provides Primary Care For  no one, unable/limited ability to care for self    Family Caregiver if Needed  child(katherine), adult    Quality of Family Relationships  supportive;involved;helpful    Able to Return to Prior Arrangements  yes       Transition Planning    Patient/Family Anticipates Transition to  home    Patient/Family Anticipated Services at Transition      Transportation Anticipated  family or friend will provide       Discharge Needs Assessment    Readmission Within the Last 30 Days  no previous admission in last 30 days    Concerns to be Addressed  discharge planning    Equipment Currently Used at Home  grab bar;rollator;other (see comments) transport chair    Anticipated Changes Related to Illness  none    Equipment Needed After Discharge  none    Outpatient/Agency/Support Group Needs  -- TBD    Discharge Facility/Level of Care Needs  assisted living facility    Current Discharge Risk  chronically ill        Discharge Plan     Row Name 04/07/19 1402       Plan    Plan  [facility]    Patient/Family in Agreement with Plan  yes    Plan Comments  Met with pt at bedside.  She resides at [facility].  She is ambulatory with a Rollator and has a transport chair.  Confirmed she has [employer] Medicare with Rx coverage.  Pt has 2 daughters, on in [facility] and one in [facility].  Both are her POA.  Anticipate pt's ability to return to [facility] when medically ready.  CM will cont to follow.    Final Discharge Disposition Code  01 - home or self-care        Destination      No service  coordination in this encounter.      Durable Medical Equipment      No service coordination in this encounter.      Dialysis/Infusion      No service coordination in this encounter.      Home Medical Care      No service coordination in this encounter.      Therapy      No service coordination in this encounter.      Community Resources      No service coordination in this encounter.          Demographic Summary     Row Name 04/07/19 1359       General Information    Admission Type  inpatient    Referral Source  admission list    Reason for Consult  discharge planning    Preferred Language  English       Contact Information    Permission Granted to Share Info With      Contact Information Obtained for          Functional Status     Row Name 04/07/19 8947       Functional Status    Usual Activity Tolerance  moderate       Functional Status, IADL    Medications  independent    Meal Preparation  assistive person    Housekeeping  assistive person    Laundry  assistive person    Shopping  assistive person        Psychosocial    No documentation.       Abuse/Neglect    No documentation.       Legal    No documentation.       Substance Abuse    No documentation.       Patient Forms    No documentation.           Jenny Feldman

## 2019-04-07 NOTE — PLAN OF CARE
Problem: Patient Care Overview  Goal: Interprofessional Rounds/Family Conf  Outcome: Ongoing (interventions implemented as appropriate)   04/07/19 1605   Interdisciplinary Rounds/Family Conf   Summary Palliative consult for GOC/ACP and comfort care. Observed increase mild/moderate use of accessory muscles for breathing with trying to talk; demonstrated weak cough to try to clear secretions. Pt able to communicate w/ writing, has pen and paper at hand. Dr. Back revisited when family arrived for GOC discussion. Continue current level of care at this time. Palliative following for continuing support and GOC discussion.       Problem: Palliative Care (Adult)  Intervention: Minimize Discomfort   04/07/19 1605   Promote Oxygenation/Perfusion   Pain Management Interventions pillow support provided;position adjusted     Intervention: Optimize Function   04/07/19 1605   Musculoskeletal Interventions   Fatigue Management frequent rest breaks encouraged;paced activity encouraged     Intervention: Promote Informed Decision Making and Goal Setting   04/07/19 1605   Coping/Psychosocial Interventions   Life Transition/Adjustment palliative care discussed;palliative care initiated     Intervention: Support/Optimize Psychosocial Response   04/07/19 1605   Coping/Psychosocial Interventions   Supportive Measures active listening utilized;self-reflection promoted;self-responsibility promoted;verbalization of feelings encouraged   Psychosocial Support   Family/Support System Care self-care encouraged;support provided       Goal: Identify Related Risk Factors and Signs and Symptoms  Outcome: Ongoing (interventions implemented as appropriate)   04/07/19 1605   Palliative Care (Adult)   Palliative Care: Related Risk Factors advanced age;chronic illness;condition is progressive;terminal illness;worsening symptoms   Palliative Care: Signs and Symptoms breathlessness;fatigue;other (see comments)  (dysphagia, aspiration, slurred speech)      Goal: Maximized Comfort  Outcome: Ongoing (interventions implemented as appropriate)   04/07/19 1605   Palliative Care (Adult)   Maximized Comfort making progress toward outcome     Goal: Enhanced Quality of Life  Outcome: Ongoing (interventions implemented as appropriate)   04/07/19 1605   Palliative Care (Adult)   Enhanced Quality of Life making progress toward outcome

## 2019-04-07 NOTE — PROGRESS NOTES
[facility] Medicine Services  PROGRESS NOTE    Patient Name: Omayra Bhardwaj  : 1935  MRN: [id number]    Date of Admission: 2019  Length of Stay: 1  Primary Care Physician: Suzanna White MD    Subjective   Subjective     CC:  Aspiration PNA / ALS    HPI:  Patient very weak and with poor phonation and looks as if she cannot protect her airway.   Moreover looks to have poor respiratory reserve.    Review of Systems    Unable to assess    Otherwise ROS is negative except as mentioned in the HPI.    Objective   Objective     Vital Signs:   Temp:  [98.2 °F (36.8 °C)-99.5 °F (37.5 °C)] 99 °F (37.2 °C)  Heart Rate:  [66-84] 66  Resp:  [16-20] 18  BP: (136-153)/(59-77) 136/59        Physical Exam:    Constitutional: very weak, can barely communicate, very pleasant  Eyes: PERRLA, sclerae anicteric, no conjunctival injection  HENT: NCAT, weak muscles, challenged to talk  Neck: limp neck, trachea midline  Respiratory: poor inspiratory capacity, rhonchi, unable to clear secretions  Cardiovascular: RRR, s1 and s2  Gastrointestinal: Positive bowel sounds, soft, nontender, nondistended  Musculoskeletal: No bilateral ankle edema  Psychiatric: Appropriate affect, cooperative  Neurologic: profound generalized weakness    Results Reviewed:  I have personally reviewed current lab, radiology, and data and agree.    Results from last 7 days   Lab Units 19  1013 19  1146   WBC 10*3/mm3 7.81 5.87   HEMOGLOBIN g/dL 13.6 14.1   HEMATOCRIT % 42.5 43.0   PLATELETS 10*3/mm3 126* 149*     Results from last 7 days   Lab Units 19  1154 19  1146   SODIUM mmol/L  --  144   POTASSIUM mmol/L  --  3.6   CHLORIDE mmol/L  --  106   CO2 mmol/L  --  25.0   BUN mg/dL  --  18   CREATININE mg/dL  --  0.74   GLUCOSE mg/dL  --  79   CALCIUM mg/dL  --  10.8*   ALT (SGPT) U/L  --  15   AST (SGOT) U/L  --  25   TROPONIN I ng/mL 0.00  --      Estimated Creatinine Clearance: 54.5 mL/min (by  C-G formula based on SCr of 0.74 mg/dL).    BNP   Date Value Ref Range Status   04/06/2019 50.0 0.0 - 100.0 pg/mL Final     Comment:     Results may be falsely decreased if patient taking Biotin.       Microbiology Results Abnormal     Procedure Component Value - Date/Time    Blood Culture - Blood, Arm, Right [[id number]] Collected:  04/06/19 1135    Lab Status:  Preliminary result Specimen:  Blood from Arm, Right Updated:  04/07/19 1215     Blood Culture No growth at 24 hours    Blood Culture - Blood, Arm, Left [[id number]] Collected:  04/06/19 1146    Lab Status:  Preliminary result Specimen:  Blood from Arm, Left Updated:  04/07/19 1215     Blood Culture No growth at 24 hours          Imaging Results (last 24 hours)     Procedure Component Value Units Date/Time    XR Chest 1 View [[id number]] Collected:  04/06/19 1417     Updated:  04/06/19 2255    Narrative:          EXAMINATION: XR CHEST 1 VW - 04/06/2019     INDICATION: Shortness of air.     COMPARISON: 03/27/2019     FINDINGS: Heart is normal in size. Vasculature appears normal.  Coarsening of the basilar interstitial markings is unchanged from the  prior study, apparently chronic. Left shoulder joint DJD and hiatal  hernia are again noted.           Impression:       Stable chest exam with mild chronic-appearing interstitial  changes and hiatal hernia. No acute chest disease is seen.     DICTATED:   04/06/2019  EDITED/ls :   04/06/2019      This report was finalized on 4/6/2019 10:52 PM by DR. Dawood Grajeda MD.             Results for orders placed during the hospital encounter of 07/15/17   Adult Transthoracic Echo Complete    Narrative · Left ventricular systolic function is normal. Estimated EF = 70%.  · Left ventricular diastolic dysfunction (grade I) consistent with   impaired relaxation.  · Right ventricular cavity is mildly dilated.  · Normal right ventricular wall thickness, systolic function and septal   motion noted with right ventricular cavity mildly  dilated.  · No evidence of pulmonary hypertension is present.  · There is no evidence of pericardial effusion.  · No significant structural valvular abnormality demonstrated.          I have reviewed the medications:    Current Facility-Administered Medications:   •  acebutolol (SECTRAL) capsule 200 mg, 200 mg, Oral, Q24H, Carlo Arias MD, 200 mg at 04/07/19 0915  •  atorvastatin (LIPITOR) tablet 10 mg, 10 mg, Oral, Nightly, Carlo Arias MD  •  heparin (porcine) 5000 UNIT/ML injection 5,000 Units, 5,000 Units, Subcutaneous, Q12H, Carlo Arias MD, 5,000 Units at 04/07/19 0915  •  lisinopril (PRINIVIL,ZESTRIL) tablet 5 mg, 5 mg, Oral, Daily, Carlo Arias MD, 5 mg at 04/07/19 0915  •  Morphine sulfate (PF) injection 1 mg, 1 mg, Intravenous, Q4H PRN **AND** naloxone (NARCAN) injection 0.4 mg, 0.4 mg, Intravenous, Q5 Min PRN, Carlo Arias MD  •  piperacillin-tazobactam (ZOSYN) 4.5 g in iso-osmotic dextrose 100 mL IVPB (premix), 4.5 g, Intravenous, Once, Carlo Arias MD  •  piperacillin-tazobactam (ZOSYN) 4.5 g in iso-osmotic dextrose 100 mL IVPB (premix), 4.5 g, Intravenous, Q8H, Carlo Arias MD, Last Rate: 0 mL/hr at 04/07/19 0030, 4.5 g at 04/07/19 1243  •  sodium chloride 0.9 % flush 10 mL, 10 mL, Intravenous, PRN, Parveen Gill MD  •  [COMPLETED] Insert peripheral IV, , , Once **AND** sodium chloride 0.9 % flush 10 mL, 10 mL, Intravenous, PRN, Yazan Pugh APRN  •  sodium chloride 0.9 % flush 3 mL, 3 mL, Intravenous, Q12H, Carlo Arias MD, 3 mL at 04/06/19 2017  •  sodium chloride 0.9 % flush 3-10 mL, 3-10 mL, Intravenous, PRN, Carlo Arias MD  •  torsemide (DEMADEX) tablet 5 mg, 5 mg, Oral, Daily, Carlo Arias MD, 5 mg at 04/07/19 0915      Assessment/Plan   Assessment / Plan     Active Hospital Problems    Diagnosis POA   • Aspiration pneumonia (CMS/[facility]) [J69.0] Yes          Brief Hospital Course to date:  Omayra RINKU Bhardwaj is a 83 y.o. female with probable  worsening of ALS and aspiration.    *Shortness of breath and cough for couple of days.  No fever or chills.  CT scan of the long without contrast shows evidence of lower lobe infiltrate possibly aspiration.  So the symptoms are possibly secondary to early aspiration pneumonia.     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     *Dysphagia     * HTN     * HLP      Palliative for goals of care - Seen by Dr. Back today    NPO diet recommended by Speech    Doesn't appear to have any reserve if she chokes on food or medication.    Discussed case with Daughter today - Destiney Saucedo    DVT Prophylaxis:  Heparin SC    Disposition: I expect the patient to be discharged TBD    CODE STATUS:   Code Status and Medical Interventions:   Ordered at: 04/07/19 1334     Limited Support to NOT Include:    Intubation    Cardioversion/Defibrillation    Antiarrhythmic Drugs    Vasopressors     Code Status:    No CPR     Medical Interventions (Level of Support Prior to Arrest):    Limited         Electronically signed by Mateo Freeman MD, 04/07/19, 6:11 PM.

## 2019-04-07 NOTE — PLAN OF CARE
Problem: Skin Injury Risk (Adult)  Goal: Identify Related Risk Factors and Signs and Symptoms  Outcome: Ongoing (interventions implemented as appropriate)   04/07/19 1515   Skin Injury Risk (Adult)   Related Risk Factors (Skin Injury Risk) advanced age;fluid intake inadequate;mobility impaired;infection       Problem: Patient Care Overview  Goal: Plan of Care Review  Outcome: Ongoing (interventions implemented as appropriate)   04/07/19 1515   Coping/Psychosocial   Plan of Care Reviewed With patient   Plan of Care Review   Progress declining   OTHER   Outcome Summary Pt. was given medications crushed in pudding and had difficulty swallowing and managing secretions. Family at bedside and supportive of patient. No complaints of pain or discomfort at this time. VSS. Will continue to monitor

## 2019-04-07 NOTE — PROGRESS NOTES
Malnutrition Severity Assessment    Patient Name:  Omayra Bhardwaj  YOB: 1935  MRN: [id number]  Admit Date:  4/6/2019    Patient meets criteria for : Severe malnutrition    Comments:  MD please review and attest as appropriate.    Malnutrition Type: Chronic Illness Malnutrition     Malnutrition Type (last 8 hours)      Malnutrition Severity Assessment     Row Name 04/07/19 1524       Malnutrition Severity Assessment    Malnutrition Type  Chronic Illness Malnutrition    Row Name 04/07/19 1524       Physical Signs of Malnutrition (Chronic)    Muscle Wasting  Severe    Fat Loss  Mild    Row Name 04/07/19 1524       Weight Status (Chronic)    Weight Loss  Severe (>7.5% / 3 mo)    Row Name 04/07/19 1524       Energy Intake Status (Chronic)    Energy Intake  Severe (< or equal to 50% / > or equal to 1 mo)    Row Name 04/07/19 1524       Criteria Met (Must meet criteria for severity in at least 2 of these categories: M Wasting, Fat Loss, Fluid, Secondary Signs, Wt. Status, Intake)    Patient meets criteria for   Severe malnutrition          Electronically signed by:  Jenny Thibodeaux RD  04/07/19 3:26 PM

## 2019-04-07 NOTE — CONSULTS
Clinical Nutrition   Reason For Visit: Nurse practioner/physician assistent consult, Malnutrition Severity Assessment, Difficulty chewing/swallowing, Unintentional weight loss, Reduced oral intake    Patient Name: Omayra Bhardwaj  YOB: 1935  MRN: [id number]  Date of Encounter: 04/07/19 2:44 PM  Admission date: 4/6/2019      Comments: Pt and daughter contemplating options for nutrition. Pt once resistant to PEG but appears she may have changed her mind. Asking questions regarding evaluation for po and possible EN.  Defer discussion of POC/GOC to palliative MD.   Pt meets criteria for Chronic Severe Malnutrition based on weight loss and decreased po intake, muscle and fat loss. Please see MSA note.    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  Dysphagia  HTN      Applicable Nutrition-Related Information:  Dysphagia-failed bedside dysphagia eval      Applicable medical tests/procedures since admission:  4/6 SLP dysphagia eval at bedside-rec's alternate nutrition or comfort diet of puree      PMH: She  has a past medical history of Chest pain (05/2015), Chronic hypertension, Chronic lymphedema, Depression, Disordered sleep, Dyslipidemia, Former tobacco use, GERD (gastroesophageal reflux disease), History of obesity, History of obesity, Hypercalcemia (2010), Hyperlipidemia, Hyperparathyroidism (CMS/HCC), Hyperparathyroidism (CMS/HCC) (2013), Hypertension, Hypertensive cardiovascular disease, Obesity, Class I, BMI 30-34.9 (1/17/2019), Rib pain on left side (07/28/2017), Skin cancer (2010), Staph infection, Subarachnoid hemorrhage, traumatic (CMS/HCC) (07/15/2017), Syncope (1/29/2018), Thyroid nodule, and UTI (urinary tract infection) (7/15/2017).   PSxH: She  has a past surgical history that includes Tonsillectomy (1942); Appendectomy (1945); Cataract extraction (2010); Parathyroidectomy; Squamous cell carcinoma excision  "(2010); Other surgical history (1986); Breast biopsy (Left); and Breast excisional biopsy (Left).        Reported/Observed/Food/Nutrition Related History     Pt's daughter in room at time of visit. She states her mother has been losing weight since the first of the year.  Pt resides in a NH and receives a puree diet there.  Daughter states pt has had a decline in physical status and strength other this same time period. Pt can feed self but daughter states she drops a lot of the food on her bib, uncertain how much she actually eats. Pt's speech is incomprehensible, so she points and writes out what she wants to say.  Pt states she is hungry, daughter is concerned she has not had anything to eat since admission. Dr Back had visited pt prior to daugher's arrival and RN has paged him to come back today to discuss POC.  Daughter states they wanted to have a PEG placed \"a while ago\" but the pt was resistant. She states pt is now considering a PEG.  She is asking about all options for nutrition.  Provided pt and daughter with information regarding EN, comfort diet or further SLP dysphagia evaluation for safety of po. Advised daughter and pt they need to speak with Palliative and determine GOC/POC moving forward.       Anthropometrics   Height: 64\"  Weight: 142 lbs 12.8 oz --standing scale on admission  BMI: 24.50  BMI classification: Normal: 18.5-24.9kg/m2   UBW: 174 lbs  IBW: 120 lbs      Weight change: Weight loss of ~32 lbs in 3.5 months (18%) unintentional.       Nutrition Focused Physical Exam       Subcutaneous fat:  Orbital Moderate   Buccal Moderate   Tricep Severe   Ribs- thoracic and lumbar region, lower back, midaxillary line Unable to determine at this time     Muscle:  Temple/temporalis Moderate   Clavicle/acromion- pectoralis, deltoid Severe   Shoulder- deltoid Unable to determine at this time       Decline in functional status: Yes        Labs reviewed   Labs reviewed: Yes  Results from last 7 days   Lab " Units 04/07/19  0547 04/06/19  1146   SODIUM mmol/L  --  144   POTASSIUM mmol/L  --  3.6   CHLORIDE mmol/L  --  106   CO2 mmol/L  --  25.0   BUN mg/dL  --  18   CREATININE mg/dL  --  0.74   GLUCOSE mg/dL  --  79   CALCIUM mg/dL  --  10.8*   PHOSPHORUS mg/dL 3.0  --    MAGNESIUM mg/dL 1.9  --      Results from last 7 days   Lab Units 04/07/19  1013 04/06/19  1146   WBC 10*3/mm3 7.81 5.87   ALBUMIN g/dL  --  4.37         Lab Results   Lab Value Date/Time    HGBA1C 5.80 (H) 07/16/2017 0445     Medications reviewed   Medications reviewed: Yes  Pertinent:    Intake/Ouptut 24 hrs (7:00AM - 6:59 AM)     Intake & Output (last day)       04/06 0701 - 04/07 0700 04/07 0701 - 04/08 0700    IV Piggyback 50     Total Intake(mL/kg) 50 (0.8)     Urine (mL/kg/hr) 500 50 (0.1)    Total Output 500 50    Net -450 -50                     Current Nutrition Prescription   PO: NPO Diet NPO Except: Sips With Meds, Ice Chips    Evaluation of Received Nutrient/Fluid Intake:  Insufficient data       Nutrition Diagnosis     Problem Malnutrition -Chronic Severe   Etiology ALS progression, increasing difficulty with chewing/swallowing   Signs/Symptoms Decreased po intake x 3 months <75% of usual, failed bedside SLP eval, weight loss of ~32 lbs in 3.5 months (18%), muscle and fat loss.       Problem Swallowing difficulty   Etiology Bulbar ALS progression   Signs/Symptoms Aspiration pna, failed bedside dysphagia eval, NPO or comfort diet recommended       Intervention   Intervention: Follow treatment progress, Care plan reviewed, Await begin PO, Await GOC/POC discussion with Palliative and family      Goal:   General: Nutrition support treatment, Palliative care  PO: Initiate diet, Modify texture/consistency as appropriate with GOC/POC      Additional goals: No further weight loss      Monitoring/Evaluation:       Monitoring/Evaluation: Per protocol, I&O, Weight, Skin status, Symptoms, POC/GOC, Swallow function  Will Continue to follow per  protocol  Jenny Thibodeaux RD  Time Spent: 60 min

## 2019-04-07 NOTE — CONSULTS
Suzanna White MD  Consulting physician: Hugo    Chief Complaint   Patient presents with   • Shortness of Breath         HPI: Patient is an 83-year-old female with a history of ALS.  Patient was brought in hospital due to dyspnea and has been found to have pneumonia possible aspiration in nature.  Patient states that she has had some improvement in her breathing since coming into the hospital as she has no significant complaints of dyspnea at this point in time.  Of note the patient's primary means of communication is by writing as it is very difficult to understand her speech due to her ALS.      Past Medical History:   Diagnosis Date   • Chest pain 05/2015    LEXISCAN STRESS TEST    • Chronic hypertension     probably essential.     • Chronic lymphedema     with probable chronic lower extremity venous insufficiency with recent acceptable bilateral lower extremity venous duplex study, March 2013.   • Depression    • Disordered sleep     with prominent snoring and occasional nocturnal hypersomnolence.    • Dyslipidemia      untreated.   • Former tobacco use    • GERD (gastroesophageal reflux disease)    • History of obesity     (BMI 29.5).   • History of obesity     (BMI 29.5).   • Hypercalcemia 2010   • Hyperlipidemia    • Hyperparathyroidism (CMS/HCC)     with contemplated parathyroidectomy (Dr. Davis, [facility]).   • Hyperparathyroidism (CMS/HCC) 2013    HYPERPARATHYROID- BENIGN BIOPSY 3 THYROID NODULES- WATCHFUL WAITING- DR. DIANE JON AT     • Hypertension    • Hypertensive cardiovascular disease     probable   • Obesity, Class I, BMI 30-34.9 1/17/2019   • Rib pain on left side 07/28/2017    RIB PAIN ON LEFT SIDE    • Skin cancer 2010    SQAMOUS CELL SKIN CANCER- RESECTED    • Staph infection     L FINGER AFTER STICTCHES REMOVED    • Subarachnoid hemorrhage, traumatic (CMS/HCC) 07/15/2017    SUPPORTIVE CARE IN ICU, THEN HOME HEALTH WITH PT AND OT   • Syncope 1/29/2018    • Thyroid nodule     workup pending.   • UTI (urinary tract infection) 7/15/2017     Past Surgical History:   Procedure Laterality Date   • APPENDECTOMY  1945   • BREAST BIOPSY Left    • BREAST EXCISIONAL BIOPSY Left    • CATARACT EXTRACTION  2010    bilateral    • OTHER SURGICAL HISTORY  1986    Laparoscopy   • PARATHYROIDECTOMY     • SQUAMOUS CELL CARCINOMA EXCISION  2010   • TONSILLECTOMY  1942     Current Code Status     Date Active Code Status Order ID Comments User Context       4/6/2019 14:41 CPR [id number]  Carlo Arias MD ED       Questions for Current Code Status     Question Answer Comment    Code Status CPR     Medical Interventions (Level of Support Prior to Arrest) Full         Current Facility-Administered Medications   Medication Dose Route Frequency Provider Last Rate Last Dose   • acebutolol (SECTRAL) capsule 200 mg  200 mg Oral Q24H Carlo Arias MD   200 mg at 04/07/19 0915   • atorvastatin (LIPITOR) tablet 10 mg  10 mg Oral Nightly Carlo Arias MD       • heparin (porcine) 5000 UNIT/ML injection 5,000 Units  5,000 Units Subcutaneous Q12H Carlo Arias MD   5,000 Units at 04/07/19 0915   • lisinopril (PRINIVIL,ZESTRIL) tablet 5 mg  5 mg Oral Daily Carlo Arias MD   5 mg at 04/07/19 0915   • Morphine sulfate (PF) injection 1 mg  1 mg Intravenous Q4H PRN Carlo Arias MD        And   • naloxone (NARCAN) injection 0.4 mg  0.4 mg Intravenous Q5 Min PRN Carlo Arias MD       • piperacillin-tazobactam (ZOSYN) 4.5 g in iso-osmotic dextrose 100 mL IVPB (premix)  4.5 g Intravenous Once Carlo Arias MD       • piperacillin-tazobactam (ZOSYN) 4.5 g in iso-osmotic dextrose 100 mL IVPB (premix)  4.5 g Intravenous Q8H Carlo Arias MD 0 mL/hr at 04/07/19 0030 4.5 g at 04/07/19 0731   • sodium chloride 0.9 % flush 10 mL  10 mL Intravenous PRN Parveen Gill MD       • sodium chloride 0.9 % flush 10 mL  10 mL Intravenous PRN Yazan Pugh APRN       • sodium chloride  "0.9 % flush 3 mL  3 mL Intravenous Q12H Carlo Arias MD   3 mL at 19 2017   • sodium chloride 0.9 % flush 3-10 mL  3-10 mL Intravenous PRN Carlo Arias MD       • torsemide (DEMADEX) tablet 5 mg  5 mg Oral Daily Carlo Arias MD   5 mg at 19 0915        Morphine **AND** naloxone  •  sodium chloride  •  [COMPLETED] Insert peripheral IV **AND** sodium chloride  •  sodium chloride  No Known Allergies  Family History   Problem Relation Age of Onset   • Cancer Mother         THROAT CANCER    • Alcohol abuse Mother    • Alcohol abuse Father    • Heart disease Brother    • Heart failure Brother    • Cancer Brother         THROAT CANCER    • Alcohol abuse Brother    • Colon polyps Brother    • Coronary artery disease Brother         PREMATURE   • Skin cancer Brother         MELANOMA   • Hypertension Other    • Obesity Other    • Alcohol abuse Son    • Coronary artery disease Maternal Uncle    • Hypertension Maternal Grandfather    • Coronary artery disease Maternal Grandfather    • Cancer Paternal Grandmother         CARCINOMATOSIS ABDOMEN    • Breast cancer Neg Hx    • Ovarian cancer Neg Hx      Social History     Socioeconomic History   • Marital status:      Spouse name: Not on file   • Number of children: Not on file   • Years of education: Not on file   • Highest education level: Not on file   Tobacco Use   • Smoking status: Former Smoker     Types: Cigarettes     Last attempt to quit: 1963     Years since quittin.3   • Smokeless tobacco: Never Used   Substance and Sexual Activity   • Alcohol use: No   • Drug use: No   • Sexual activity: Defer     Review of Systems -all other reviewed and found negative except mentioned in the HPI      /68   Pulse 69   Temp 98.7 °F (37.1 °C) (Oral)   Resp 20   Ht 162.6 cm (64.02\")   Wt 64.8 kg (142 lb 12.8 oz)   LMP  (LMP Unknown)   SpO2 94%   BMI 24.50 kg/m²     Intake/Output Summary (Last 24 hours) at 2019 1215  Last data filed " at 4/7/2019 0828  Gross per 24 hour   Intake 50 ml   Output 550 ml   Net -500 ml     Physical Exam:      General Appearance:    Alert, cooperative, in no acute distress, difficult to understand speach   Head:    Normocephalic, without obvious abnormality, atraumatic   Eyes:            Lids and lashes normal, conjunctivae and sclerae normal, no   icterus, no pallor, corneas clear, PERRLA   Ears:    Ears appear intact with no abnormalities noted   Throat:   No oral lesions, no thrush, oral mucosa moist   Neck:   No adenopathy, supple, trachea midline, no thyromegaly, no     carotid bruit, no JVD   Back:     No kyphosis present, no scoliosis present, no skin lesions,       erythema or scars, no tenderness to percussion or                   palpation,   range of motion normal   Lungs:     Clear to auscultation,respirations regular, even and                   unlabored    Heart:    Regular rhythm and normal rate, normal S1 and S2, no            murmur, no gallop, no rub, no click   Breast Exam:    Deferred   Abdomen:     Normal bowel sounds, no masses, no organomegaly, soft        non-tender, non-distended, no guarding, no rebound                 tenderness   Genitalia:    Deferred   Extremities:   Moves all extremities well, no edema, no cyanosis, no              redness   Pulses:   Pulses palpable and equal bilaterally   Skin:   No bleeding, bruising or rash   Lymph nodes:   No palpable adenopathy   Neurologic:   Cranial nerves 2 - 12 grossly intact, sensation intact, DTR        present and equal bilaterally       Results from last 7 days   Lab Units 04/07/19  1013   WBC 10*3/mm3 7.81   HEMOGLOBIN g/dL 13.6   HEMATOCRIT % 42.5   PLATELETS 10*3/mm3 126*     Results from last 7 days   Lab Units 04/06/19  1146   SODIUM mmol/L 144   POTASSIUM mmol/L 3.6   CHLORIDE mmol/L 106   CO2 mmol/L 25.0   BUN mg/dL 18   CREATININE mg/dL 0.74   CALCIUM mg/dL 10.8*   BILIRUBIN mg/dL 0.8   ALK PHOS U/L 83   ALT (SGPT) U/L 15   AST  (SGOT) U/L 25   GLUCOSE mg/dL 79     Results from last 7 days   Lab Units 04/06/19  1146   SODIUM mmol/L 144   POTASSIUM mmol/L 3.6   CHLORIDE mmol/L 106   CO2 mmol/L 25.0   BUN mg/dL 18   CREATININE mg/dL 0.74   GLUCOSE mg/dL 79   CALCIUM mg/dL 10.8*     Imaging Results (last 72 hours)     Procedure Component Value Units Date/Time    XR Chest 1 View [[id number]] Collected:  04/06/19 1417     Updated:  04/06/19 2255    Narrative:          EXAMINATION: XR CHEST 1 VW - 04/06/2019     INDICATION: Shortness of air.     COMPARISON: 03/27/2019     FINDINGS: Heart is normal in size. Vasculature appears normal.  Coarsening of the basilar interstitial markings is unchanged from the  prior study, apparently chronic. Left shoulder joint DJD and hiatal  hernia are again noted.           Impression:       Stable chest exam with mild chronic-appearing interstitial  changes and hiatal hernia. No acute chest disease is seen.     DICTATED:   04/06/2019  EDITED/ls :   04/06/2019      This report was finalized on 4/6/2019 10:52 PM by DR. Dawood Grajeda MD.       CT Chest Without Contrast [[id number]] Collected:  04/06/19 1342     Updated:  04/06/19 1342    Narrative:       EXAMINATION: CT CHEST WO CONTRAST - 04/06/2019     INDICATION: Cough. Evaluate for pneumonia.     TECHNIQUE: Spiral acquisition 5 mm unenhanced images through the chest.     The radiation dose reduction device was turned on for each scan per the  ALARA (As Low as Reasonably Achievable) protocol.     COMPARISON: 07/15/2017 chest CT scan. Portable chest radiograph of  today's date.     FINDINGS: Lungs appear practically clear. In the left lower lobe, there  is some peribronchial thickening, perhaps a few opacified bronchi, and  minimal airspace disease which may represent a focal bronchitis or the  very earliest changes of pneumonia. No similar findings are seen  elsewhere. There is no pleural effusion. Mediastinal window images show  patient's large hiatal hernia. There is  no evidence of significant  adenopathy and no pericardial effusion. Included images of the upper  abdomen show a bland-appearing left upper pole renal cyst. Included  portions of the spleen, gallbladder, liver, pancreatic tail, adrenal  glands and right upper renal pole appear unremarkable.       Impression:       Very mild posterior left lower lobe disease, new from prior  study, possibly early changes of pneumonia or bronchitis, or in a  patient of this age, mild changes of aspiration. No other evidence of  active chest disease elsewhere.     DICTATED:   04/06/2019  EDITED/ls :   04/06/2019            Impression: PNA  Dysphagia  Dyspnea  [facility]      Plan: At this point time we will continue patient on her current plan of care, however we do need to address CODE STATUS.  Once family members arrive I will try to see about addressing CODE STATUS with him and the patient.  Could consider adding patient on Mucinex for her congestion, however nursing staff does report she has a difficult time swallowing pills so we will hold off on this at this point in time.        Jeet Back,   04/07/19  12:15 PM

## 2019-04-07 NOTE — PLAN OF CARE
Problem: Patient Care Overview  Goal: Plan of Care Review  Outcome: Ongoing (interventions implemented as appropriate)   04/07/19 0610   Coping/Psychosocial   Plan of Care Reviewed With patient   Plan of Care Review   Progress no change   OTHER   Outcome Summary VSS. NSR on monitor. Communicates through writing, speech incomprehesible. NPO. Pt continues to be unable to manage secretions, loose non production, weak cough. Rhonchi throughout. Continues to be on RA. Oral suction at bedside. Will continue to mx.

## 2019-04-08 PROCEDURE — 92610 EVALUATE SWALLOWING FUNCTION: CPT

## 2019-04-08 PROCEDURE — 92526 ORAL FUNCTION THERAPY: CPT

## 2019-04-08 PROCEDURE — 25010000002 HEPARIN (PORCINE) PER 1000 UNITS: Performed by: INTERNAL MEDICINE

## 2019-04-08 PROCEDURE — 99221 1ST HOSP IP/OBS SF/LOW 40: CPT | Performed by: PHYSICIAN ASSISTANT

## 2019-04-08 PROCEDURE — 25010000002 PIPERACILLIN SOD-TAZOBACTAM PER 1 G: Performed by: INTERNAL MEDICINE

## 2019-04-08 PROCEDURE — 99233 SBSQ HOSP IP/OBS HIGH 50: CPT | Performed by: HOSPITALIST

## 2019-04-08 RX ADMIN — HEPARIN SODIUM 5000 UNITS: 5000 INJECTION INTRAVENOUS; SUBCUTANEOUS at 21:32

## 2019-04-08 RX ADMIN — SODIUM CHLORIDE, PRESERVATIVE FREE 3 ML: 5 INJECTION INTRAVENOUS at 09:46

## 2019-04-08 RX ADMIN — SODIUM CHLORIDE, PRESERVATIVE FREE 3 ML: 5 INJECTION INTRAVENOUS at 21:32

## 2019-04-08 RX ADMIN — LISINOPRIL 5 MG: 5 TABLET ORAL at 09:46

## 2019-04-08 RX ADMIN — TORSEMIDE 5 MG: 10 TABLET ORAL at 09:46

## 2019-04-08 RX ADMIN — ATORVASTATIN CALCIUM 10 MG: 10 TABLET, FILM COATED ORAL at 21:32

## 2019-04-08 RX ADMIN — ACEBUTOLOL HYDROCHLORIDE 200 MG: 200 CAPSULE ORAL at 09:46

## 2019-04-08 RX ADMIN — TAZOBACTAM SODIUM AND PIPERACILLIN SODIUM 4.5 G: 500; 4 INJECTION, SOLUTION INTRAVENOUS at 15:59

## 2019-04-08 RX ADMIN — HEPARIN SODIUM 5000 UNITS: 5000 INJECTION INTRAVENOUS; SUBCUTANEOUS at 09:45

## 2019-04-08 RX ADMIN — TAZOBACTAM SODIUM AND PIPERACILLIN SODIUM 4.5 G: 500; 4 INJECTION, SOLUTION INTRAVENOUS at 05:16

## 2019-04-08 NOTE — PROGRESS NOTES
[facility] Medicine Services  PROGRESS NOTE    Patient Name: Omayra Bhardwaj  : 1935  MRN: [id number]    Date of Admission: 2019  Length of Stay: 2  Primary Care Physician: Suzanna White MD    Subjective   Subjective     CC:  Aspiration PNA / ALS    HPI:      Still coughing. Not short of breath. Very weak.    Review of Systems    Unable to assess    Otherwise ROS is negative except as mentioned in the HPI.    Objective   Objective     Vital Signs:   Temp:  [97.2 °F (36.2 °C)-99 °F (37.2 °C)] 97.2 °F (36.2 °C)  Heart Rate:  [62-74] 62  Resp:  [18] 18  BP: (136-153)/(59-77) 141/63        Physical Exam:    NAD, unintelligible speech, but phonation  OP clear, MMM  Neck supple  No LAD  RRR  Coarse B  +BS, ND, NT  BANDA, but weak  No rashes  Normal affect    Results Reviewed:  I have personally reviewed current lab, radiology, and data and agree.    Results from last 7 days   Lab Units 19  1013 19  1146   WBC 10*3/mm3 7.81 5.87   HEMOGLOBIN g/dL 13.6 14.1   HEMATOCRIT % 42.5 43.0   PLATELETS 10*3/mm3 126* 149*     Results from last 7 days   Lab Units 19  1154 19  1146   SODIUM mmol/L  --  144   POTASSIUM mmol/L  --  3.6   CHLORIDE mmol/L  --  106   CO2 mmol/L  --  25.0   BUN mg/dL  --  18   CREATININE mg/dL  --  0.74   GLUCOSE mg/dL  --  79   CALCIUM mg/dL  --  10.8*   ALT (SGPT) U/L  --  15   AST (SGOT) U/L  --  25   TROPONIN I ng/mL 0.00  --      Estimated Creatinine Clearance: 54.5 mL/min (by C-G formula based on SCr of 0.74 mg/dL).    BNP   Date Value Ref Range Status   2019 50.0 0.0 - 100.0 pg/mL Final     Comment:     Results may be falsely decreased if patient taking Biotin.       Microbiology Results Abnormal     Procedure Component Value - Date/Time    Blood Culture - Blood, Arm, Right [[id number]] Collected:  19 1135    Lab Status:  Preliminary result Specimen:  Blood from Arm, Right Updated:  19 1215     Blood Culture No  growth at 24 hours    Blood Culture - Blood, Arm, Left [[id number]] Collected:  04/06/19 1146    Lab Status:  Preliminary result Specimen:  Blood from Arm, Left Updated:  04/07/19 1215     Blood Culture No growth at 24 hours          Imaging Results (last 24 hours)     Procedure Component Value Units Date/Time    CT Chest Without Contrast [[id number]] Collected:  04/06/19 1342     Updated:  04/08/19 1145    Narrative:       EXAMINATION: CT CHEST WO CONTRAST - 04/06/2019     INDICATION: Cough. Evaluate for pneumonia.     TECHNIQUE: Spiral acquisition 5 mm unenhanced images through the chest.     The radiation dose reduction device was turned on for each scan per the  ALARA (As Low as Reasonably Achievable) protocol.     COMPARISON: 07/15/2017 chest CT scan. Portable chest radiograph of  today's date.     FINDINGS: Lungs appear practically clear. In the left lower lobe, there  is some peribronchial thickening, perhaps a few opacified bronchi, and  minimal airspace disease which may represent a focal bronchitis or the  very earliest changes of pneumonia. No similar findings are seen  elsewhere. There is no pleural effusion. Mediastinal window images show  patient's large hiatal hernia. There is no evidence of significant  adenopathy and no pericardial effusion. Included images of the upper  abdomen show a bland-appearing left upper pole renal cyst. Included  portions of the spleen, gallbladder, liver, pancreatic tail, adrenal  glands and right upper renal pole appear unremarkable.       Impression:       Very mild posterior left lower lobe disease, new from prior  study, possibly early changes of pneumonia or bronchitis, or in a  patient of this age, mild changes of aspiration. No other evidence of  active chest disease elsewhere.     DICTATED:   04/06/2019  EDITED/ls :   04/06/2019      This report was finalized on 4/8/2019 11:42 AM by DR. Dawood Grajeda MD.             Results for orders placed during the hospital  encounter of 07/15/17   Adult Transthoracic Echo Complete    Narrative · Left ventricular systolic function is normal. Estimated EF = 70%.  · Left ventricular diastolic dysfunction (grade I) consistent with   impaired relaxation.  · Right ventricular cavity is mildly dilated.  · Normal right ventricular wall thickness, systolic function and septal   motion noted with right ventricular cavity mildly dilated.  · No evidence of pulmonary hypertension is present.  · There is no evidence of pericardial effusion.  · No significant structural valvular abnormality demonstrated.          I have reviewed the medications:    Current Facility-Administered Medications:   •  acebutolol (SECTRAL) capsule 200 mg, 200 mg, Oral, Q24H, Carlo Arias MD, 200 mg at 04/08/19 0946  •  atorvastatin (LIPITOR) tablet 10 mg, 10 mg, Oral, Nightly, Carlo Arias MD  •  heparin (porcine) 5000 UNIT/ML injection 5,000 Units, 5,000 Units, Subcutaneous, Q12H, Carlo Arias MD, 5,000 Units at 04/08/19 0945  •  lisinopril (PRINIVIL,ZESTRIL) tablet 5 mg, 5 mg, Oral, Daily, Carlo Arias MD, 5 mg at 04/08/19 0946  •  Morphine sulfate (PF) injection 1 mg, 1 mg, Intravenous, Q4H PRN **AND** naloxone (NARCAN) injection 0.4 mg, 0.4 mg, Intravenous, Q5 Min PRN, Carlo Arias MD  •  piperacillin-tazobactam (ZOSYN) 4.5 g in iso-osmotic dextrose 100 mL IVPB (premix), 4.5 g, Intravenous, Q8H, Carlo Arias MD  •  sodium chloride 0.9 % flush 10 mL, 10 mL, Intravenous, PRN, Parveen Gill MD  •  [COMPLETED] Insert peripheral IV, , , Once **AND** sodium chloride 0.9 % flush 10 mL, 10 mL, Intravenous, PRN, Yazan Pugh APRN  •  sodium chloride 0.9 % flush 3 mL, 3 mL, Intravenous, Q12H, Carlo Arias MD, 3 mL at 04/08/19 0946  •  sodium chloride 0.9 % flush 3-10 mL, 3-10 mL, Intravenous, PRN, Carlo Arias MD  •  torsemide (DEMADEX) tablet 5 mg, 5 mg, Oral, Daily, Carlo Arias MD, 5 mg at 04/08/19 0998      Assessment/Plan    Assessment / Plan     Active Hospital Problems    Diagnosis POA   • Aspiration pneumonia (CMS/[facility]) [J69.0] Yes          Brief Hospital Course to date:  Omayra Bhardwaj is a 83 y.o. 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     *Dysphagia     * HTN     * HLP        DVT Prophylaxis:  Heparin SC    Disposition: I expect the patient to be discharged TBD    CODE STATUS:   Code Status and Medical Interventions:   Ordered at: 04/07/19 1334     Limited Support to NOT Include:    Intubation    Cardioversion/Defibrillation    Antiarrhythmic Drugs    Vasopressors     Code Status:    No CPR     Medical Interventions (Level of Support Prior to Arrest):    Limited         Electronically signed by Dawood Leon MD, 04/08/19, 11:48 AM.

## 2019-04-08 NOTE — PLAN OF CARE
Problem: Skin Injury Risk (Adult)  Goal: Skin Health and Integrity  Outcome: Ongoing (interventions implemented as appropriate)      Problem: Fall Risk (Adult)  Goal: Identify Related Risk Factors and Signs and Symptoms  Outcome: Ongoing (interventions implemented as appropriate)    Goal: Absence of Fall  Outcome: Ongoing (interventions implemented as appropriate)      Problem: Patient Care Overview  Goal: Plan of Care Review  Outcome: Ongoing (interventions implemented as appropriate)   04/08/19 0249   Coping/Psychosocial   Plan of Care Reviewed With patient   Plan of Care Review   Progress declining   OTHER   Outcome Summary Pt. VSS. No reports of pain or discomfort. Resting well. Will continue to monitor.      Goal: Individualization and Mutuality  Outcome: Ongoing (interventions implemented as appropriate)    Goal: Discharge Needs Assessment  Outcome: Ongoing (interventions implemented as appropriate)

## 2019-04-08 NOTE — PLAN OF CARE
Problem: Skin Injury Risk (Adult)  Goal: Identify Related Risk Factors and Signs and Symptoms  Outcome: Ongoing (interventions implemented as appropriate)    Goal: Skin Health and Integrity  Outcome: Ongoing (interventions implemented as appropriate)      Problem: Fall Risk (Adult)  Goal: Identify Related Risk Factors and Signs and Symptoms  Outcome: Ongoing (interventions implemented as appropriate)    Goal: Absence of Fall  Outcome: Ongoing (interventions implemented as appropriate)      Problem: Patient Care Overview  Goal: Plan of Care Review  Outcome: Ongoing (interventions implemented as appropriate)   04/08/19 0295   Coping/Psychosocial   Plan of Care Reviewed With patient;daughter   Plan of Care Review   Progress improving   OTHER   Outcome Summary VSS. deciding about PEG vs comfort only diet.      Goal: Individualization and Mutuality  Outcome: Ongoing (interventions implemented as appropriate)    Goal: Discharge Needs Assessment  Outcome: Ongoing (interventions implemented as appropriate)

## 2019-04-08 NOTE — CONSULTS
[facility] Consult    Referring Provider: Dawood Leon MD   PCP: Suzanna White MD    Reason for Consultation: Dysphagia     Chief complaint: Cough and shortness of breath     History of present illness:    Omayra Bhardwaj is a 83 y.o. female with history of recently diagnosed bulbar amyotrophic lateral sclerosis who is admitted with aspiration pneumonia.   Her daughter at bedside describes progressive neck weakness and dysarthria over the last three months.  She has been evaluated by speech therapy with findings of significant oropharyngeal dysphagia; signs of aspiration with all consistencies.   GI is consulted for considerations of a PEG tube.  Patient is unsure if she wants this.  Family members states they have discussed some with palliative care and are interested in speaking with hospice as well.      Allergies:  Patient has no known allergies.    Scheduled Meds:    acebutolol 200 mg Oral Q24H   atorvastatin 10 mg Oral Nightly   heparin (porcine) 5,000 Units Subcutaneous Q12H   lisinopril 5 mg Oral Daily   piperacillin-tazobactam 4.5 g Intravenous Q8H   sodium chloride 3 mL Intravenous Q12H   torsemide 5 mg Oral Daily        Infusions:       PRN Meds:  Morphine **AND** naloxone  •  sodium chloride  •  [COMPLETED] Insert peripheral IV **AND** sodium chloride  •  sodium chloride    Home Meds:  Medications Prior to Admission   Medication Sig Dispense Refill Last Dose   • acebutolol (SECTRAL) 200 MG capsule TAKE (1) CAPSULE BY MOUTH DAILY. 30 capsule 6 Taking   • atorvastatin (LIPITOR) 10 MG tablet Take 1/2 tablet (dose = 5 mg) nightly   Taking   • B Complex Vitamins (VITAMIN B COMPLEX PO) Take  by mouth Daily.   Taking   • Cholecalciferol (VITAMIN D3) 2000 units chewable tablet Chew 1 tablet Daily.   Taking   • diphenhydrAMINE (BENADRYL) 2 % cream Apply  topically to the appropriate area as directed.   Taking   • Elastic Bandages & Supports (FUTURO SOFT CERVICAL COLLAR) misc 1 Device  Daily. 1 each 0 Taking   • famotidine (PEPCID) 20 MG tablet Take 20 mg by mouth 2 (Two) Times a Day.   Taking   • lisinopril (PRINIVIL,ZESTRIL) 5 MG tablet Take 5 mg by mouth Daily.      • torsemide (DEMADEX) 10 MG tablet Take 5 mg by mouth Daily.      • triamcinolone (KENALOG) 0.5 % cream Apply  topically to the appropriate area as directed Every 12 (Twelve) Hours.   Taking   • vitamin B-12 (CYANOCOBALAMIN) 100 MCG tablet Take  by mouth.   Taking       ROS: Review of Systems   Unable to perform ROS: Other   A 12 point ROS was attempted but limited by patient's dysarthria.   She does contact some by writing on paper and nodding her head.       PAST MED HX:  Past Medical History:   Diagnosis Date   • Chest pain 05/2015    LEXISCAN STRESS TEST    • Chronic hypertension     probably essential.     • Chronic lymphedema     with probable chronic lower extremity venous insufficiency with recent acceptable bilateral lower extremity venous duplex study, March 2013.   • Depression    • Disordered sleep     with prominent snoring and occasional nocturnal hypersomnolence.    • Dyslipidemia      untreated.   • Former tobacco use    • GERD (gastroesophageal reflux disease)    • History of obesity     (BMI 29.5).   • History of obesity     (BMI 29.5).   • Hypercalcemia 2010   • Hyperlipidemia    • Hyperparathyroidism (CMS/HCC)     with contemplated parathyroidectomy (Dr. Davis, [facility]).   • Hyperparathyroidism (CMS/HCC) 2013    HYPERPARATHYROID- BENIGN BIOPSY 3 THYROID NODULES- WATCHFUL WAITING- DR. DIANE JON AT     • Hypertension    • Hypertensive cardiovascular disease     probable   • Obesity, Class I, BMI 30-34.9 1/17/2019   • Rib pain on left side 07/28/2017    RIB PAIN ON LEFT SIDE    • Skin cancer 2010    SQAMOUS CELL SKIN CANCER- RESECTED    • Staph infection     L FINGER AFTER STICTCHES REMOVED    • Subarachnoid hemorrhage, traumatic (CMS/HCC) 07/15/2017    SUPPORTIVE CARE IN ICU, THEN  "HOME HEALTH WITH PT AND OT   • Syncope 2018   • Thyroid nodule     workup pending.   • UTI (urinary tract infection) 7/15/2017       PAST SURG HX:  Past Surgical History:   Procedure Laterality Date   • APPENDECTOMY     • BREAST BIOPSY Left    • BREAST EXCISIONAL BIOPSY Left    • CATARACT EXTRACTION  2010    bilateral    • OTHER SURGICAL HISTORY      Laparoscopy   • PARATHYROIDECTOMY     • SQUAMOUS CELL CARCINOMA EXCISION     • TONSILLECTOMY         FAM HX:  Family History   Problem Relation Age of Onset   • Cancer Mother         THROAT CANCER    • Alcohol abuse Mother    • Alcohol abuse Father    • Heart disease Brother    • Heart failure Brother    • Cancer Brother         THROAT CANCER    • Alcohol abuse Brother    • Colon polyps Brother    • Coronary artery disease Brother         PREMATURE   • Skin cancer Brother         MELANOMA   • Hypertension Other    • Obesity Other    • Alcohol abuse Son    • Coronary artery disease Maternal Uncle    • Hypertension Maternal Grandfather    • Coronary artery disease Maternal Grandfather    • Cancer Paternal Grandmother         CARCINOMATOSIS ABDOMEN    • Breast cancer Neg Hx    • Ovarian cancer Neg Hx        SOC HX:  Social History     Socioeconomic History   • Marital status:      Spouse name: Not on file   • Number of children: Not on file   • Years of education: Not on file   • Highest education level: Not on file   Tobacco Use   • Smoking status: Former Smoker     Types: Cigarettes     Last attempt to quit:      Years since quittin.3   • Smokeless tobacco: Never Used   Substance and Sexual Activity   • Alcohol use: No   • Drug use: No   • Sexual activity: Defer       PHYSICAL EXAM  /63 (BP Location: Left arm, Patient Position: Sitting)   Pulse 62   Temp 97.2 °F (36.2 °C) (Axillary)   Resp 18   Ht 162.6 cm (64.02\")   Wt 64.8 kg (142 lb 12.8 oz)   LMP  (LMP Unknown)   SpO2 92%   BMI 24.50 kg/m²   Wt Readings from Last 3 " Encounters:   04/06/19 64.8 kg (142 lb 12.8 oz)   03/27/19 72.6 kg (160 lb 0.9 oz)   01/25/19 72.6 kg (160 lb)   ,body mass index is 24.5 kg/m².  Physical Exam   Constitutional:   Elderly and frail    Eyes: No scleral icterus.   Neck:   Severe weakness    Cardiovascular: Normal rate and regular rhythm.   Pulmonary/Chest:   Coarse breath sounds bilaterally    Abdominal: Soft. Bowel sounds are normal. She exhibits no distension. There is no tenderness.   Neurological:   Severe dysarthria.  Significant weakness of the neck and shoulders   Skin: Skin is warm and dry.   Psychiatric: She has a normal mood and affect.   Nursing note and vitals reviewed.      Results Review:   I reviewed the patient's new clinical results.    Lab Results   Component Value Date    WBC 7.81 04/07/2019    HGB 13.6 04/07/2019    HGB 14.1 04/06/2019    HGB 13.6 03/27/2019    HCT 42.5 04/07/2019    MCV 95.3 04/07/2019     (L) 04/07/2019       Lab Results   Component Value Date    INR 1.08 03/27/2019    INR 0.93 07/15/2017       Lab Results   Component Value Date    GLUCOSE 79 04/06/2019    BUN 18 04/06/2019    CREATININE 0.74 04/06/2019    EGFRIFNONA 75 04/06/2019    EGFRIFAFRI 94 02/25/2019    BCR 24.3 04/06/2019    CO2 25.0 04/06/2019    CALCIUM 10.8 (H) 04/06/2019    PROTENTOTREF 6.3 02/25/2019    ALBUMIN 4.37 04/06/2019    ALKPHOS 83 04/06/2019    BILITOT 0.8 04/06/2019    ALT 15 04/06/2019    AST 25 04/06/2019       ASSESSMENTS/PLANS    1. Dysphagia  2. Aspiration pneumonia  3. Bulbar ALS     Patient is high risk for endoscopy/PEG tube placement.   Family is requesting input from hospice which I agree is appropriate.     >>>  Will consult hospice and follow      I discussed the patients findings and my recommendations with patient and her family     SADAF Fitzgerald  04/08/19  1:47 PM

## 2019-04-08 NOTE — PLAN OF CARE
Problem: Patient Care Overview  Goal: Plan of Care Review  Outcome: Ongoing (interventions implemented as appropriate)   04/08/19 6917   Coping/Psychosocial   Plan of Care Reviewed With patient;daughter   SLP re-evaluation and treatment completed. Will f/u for further edu/comfort diet modifications, PRN. May also benefit from SLP communication/AAC eval, pending overall prognosis. Please see note for further details and recommendations.

## 2019-04-08 NOTE — PLAN OF CARE
Problem: Patient Care Overview  Goal: Interprofessional Rounds/Family Conf   04/08/19 1345   Interdisciplinary Rounds/Family Conf   Summary Palliative Care Interdisciplinary Rounds - Palliative Care Team members present: CAROL Back DO; IRMA Will APRN; ADOLFO Tong RN, PN; RYAN Gil LCSW, [facility]-; DANIEL Jose MDiv, [facility]; RYAN Aponte RN, PN   Participants advanced practice nurse;nursing;pastoral care;physician;social work/services       Problem: Palliative Care (Adult)  Intervention: Support/Optimize Psychosocial Response   04/08/19 1225   Coping/Psychosocial Interventions   Supportive Measures active listening utilized;positive reinforcement provided;verbalization of feelings encouraged;other (see comments)  (symptom assessment )   Psychosocial Support   Family/Support System Care support provided   Visit with patient and family at bedside, patient engaging and receptive - reports mild neck pain, dtr working to support with towels, neck roll.  Family awaiting visit with GI to discuss possible PEG due to patient aspiration/dysphagia.  Palliative continues to follow to assist with plan of care and support.

## 2019-04-08 NOTE — THERAPY RE-EVALUATION
Acute Care - Speech Language Pathology   Swallow Re-Evaluation [facility]   Clinical Swallow Evaluation  & Swallow Treatment/Education     Patient Name: Omayra Bhardwaj  : 1935  MRN: [id number]  Today's Date: 2019               Admit Date: 2019    Visit Dx:     ICD-10-CM ICD-9-CM   1. Aspiration pneumonia, unspecified aspiration pneumonia type, unspecified laterality, unspecified part of lung (CMS/[facility]) J69.0 507.0   2. ALS (amyotrophic lateral sclerosis) (CMS/[facility]) G12.21 335.20   3. Dysphagia, unspecified type R13.10 787.20   4. Oropharyngeal dysphagia R13.12 787.22     Patient Active Problem List   Diagnosis   • Hypertensive cardiovascular disease   • Mild episode of recurrent major depressive disorder (CMS/[facility])   • Chronic acquired lymphedema   • Thyroid nodule   • Hyperparathyroidism, primary (CMS/[facility])   • Disordered sleep   • Gastroesophageal reflux disease without esophagitis   • Difficulty with speech   • Allergic rhinitis   • Benign essential hypertension   • Dysphagia as late effect of cerebrovascular accident (CVA)   • Hypercholesterolemia   • Adhesive capsulitis of left shoulder   • Chronic diastolic CHF (congestive heart failure), NYHA class 2 (CMS/[facility])   • Bilateral edema of lower extremity   • Mild neurocognitive disorder   • Senile osteoporosis   • Progressive bulbar palsy (CMS/[facility])   • Risk for falls   • Multifactorial gait disorder   • Chronic kidney disease, stage 3 (moderate) (CMS/[facility])   • Chronic left shoulder pain   • Urge incontinence of urine   • Hypercalcemia   • Varicose veins of lower extremities without ulcer or inflammation   • Subarachnoid hemorrhage after traumatic injury without open intracranial wound, with prolonged loss of consciousness and return to pre-existing level of consciousness (CMS/[facility])   • Scaly patch rash   • Chronic pain of both knees   • Chronic bilateral low back pain without sciatica   • General weakness   • Urinary frequency   • Dysarthria   •  Hypertensive heart and chronic kidney disease stage 3 (CMS/HCC)   • Obesity   • Shoulder pain   • Varicose veins of both lower extremities   • B12 deficiency   • Aspiration pneumonia (CMS/HCC)     Past Medical History:   Diagnosis Date   • Chest pain 05/2015    LEXISCAN STRESS TEST    • Chronic hypertension     probably essential.     • Chronic lymphedema     with probable chronic lower extremity venous insufficiency with recent acceptable bilateral lower extremity venous duplex study, March 2013.   • Depression    • Disordered sleep     with prominent snoring and occasional nocturnal hypersomnolence.    • Dyslipidemia      untreated.   • Former tobacco use    • GERD (gastroesophageal reflux disease)    • History of obesity     (BMI 29.5).   • History of obesity     (BMI 29.5).   • Hypercalcemia 2010   • Hyperlipidemia    • Hyperparathyroidism (CMS/HCC)     with contemplated parathyroidectomy (Dr. Davis, [facility]).   • Hyperparathyroidism (CMS/HCC) 2013    HYPERPARATHYROID- BENIGN BIOPSY 3 THYROID NODULES- WATCHFUL WAITING- DR. DIANE JON AT     • Hypertension    • Hypertensive cardiovascular disease     probable   • Obesity, Class I, BMI 30-34.9 1/17/2019   • Rib pain on left side 07/28/2017    RIB PAIN ON LEFT SIDE    • Skin cancer 2010    SQAMOUS CELL SKIN CANCER- RESECTED    • Staph infection     L FINGER AFTER STICTCHES REMOVED    • Subarachnoid hemorrhage, traumatic (CMS/HCC) 07/15/2017    SUPPORTIVE CARE IN ICU, THEN HOME HEALTH WITH PT AND OT   • Syncope 1/29/2018   • Thyroid nodule     workup pending.   • UTI (urinary tract infection) 7/15/2017     Past Surgical History:   Procedure Laterality Date   • APPENDECTOMY  1945   • BREAST BIOPSY Left    • BREAST EXCISIONAL BIOPSY Left    • CATARACT EXTRACTION  2010    bilateral    • OTHER SURGICAL HISTORY  1986    Laparoscopy   • PARATHYROIDECTOMY     • SQUAMOUS CELL CARCINOMA EXCISION  2010   • TONSILLECTOMY  1942        SWALLOW  EVALUATION (last 72 hours)      SLP Adult Swallow Evaluation     Row Name 04/08/19 0900 04/06/19 1530                Rehab Evaluation    Document Type  re-evaluation  -AC  evaluation  -RD       Subjective Information  complains of;weakness;fatigue  -AC  complains of;weakness  -RD       Patient Observations  alert;cooperative  -AC  alert;cooperative;agree to therapy  -RD       Patient/Family Observations  Pt's dtr, Prabhu, present.  -AC  Daughters present  -RD       Patient Effort  good  -AC  good  -RD          General Information    Patient Profile Reviewed  yes  -AC  yes  -RD       Pertinent History Of Current Problem  Adm w/ aspiration PNA. Hx bulbar ALS, dysphagia (prev on thickened liquids), CHF, CKD, SAH. Pt DNR.  -AC  Adm w/ aspiration PNA. Bulbar ALS per MD note, but pt/family reports no official diagnosis. Pt supposed to be following up w/ Neurologist, Dr. Saini at the [facility]. Pt w/ progressive decline in speech and swallowing in less than 1 year. Pt w/ OP MBS 9/4/18 that revealed aspiration w/ thins and nectar-thick, unless tsp sized sips of nectar and pureed diet. Family reports that pt has not been consistently thickening liquids at home and that speech and swallowing has declined since 9/2018. Dtr reports pt w/ recent weight loss of ~ 20 lbs or greater since September. Pt has been unable to hold head upright 2' neck/muscle weakness and has a soft collar to use PRN.   -RD       Current Method of Nutrition  NPO  -AC  NPO  -RD       Precautions/Limitations, Vision  --  WFL with corrective lenses  -RD       Precautions/Limitations, Hearing  --  WFL;for purposes of eval  -RD       Prior Level of Function-Communication  --  motor speech impairment;other (see comments) severe dysarthria  -RD       Prior Level of Function-Swallowing  --  puree;nectar thick liquids;other (see comments) via tsp sized sips, not consistently drinking NT per family  -RD       Plans/Goals Discussed with   patient;family;agreed upon  -AC  patient;family;agreed upon  -RD       Barriers to Rehab  medically complex;previous functional deficit  -AC  medically complex;previous functional deficit  -RD       Patient's Goals for Discharge  return to PO diet unsure about feeding tube @ this time  -AC  eat/drink without coughing/choking;declined (alternate means of nutrition/hydration);comfort diet  -RD       Family Goals for Discharge  other (see comments) respect pt's wishes  -AC  patient able to eat/drink without coughing/choking;comfort diet;other (see comments) want palliative/hospice consult  -RD          Pain Assessment    Additional Documentation  --  Pain Scale: FACES Pre/Post-Treatment (Group)  -RD          Pain Scale: FACES Pre/Post-Treatment    Pain: FACES Scale, Pretreatment  0-->no hurt  -AC  2-->hurts little bit  -RD       Pain: FACES Scale, Post-Treatment  0-->no hurt  -AC  2-->hurts little bit  -RD       Pre/Post Treatment Pain Comment  --  Appears uncomfortable w/ secretions, but no overt signs of pain  -RD          Oral Motor and Function    Dentition Assessment  upper dentures/partial in place;lower dentures/partial in place;other (see comments) removed & soaked w/ denture cleanser following study  -AC  --       Secretion Management  anterior loss;problems swallowing secretions;wet vocal quality;requires suctioning to control secretions  -AC  problems swallowing secretions;anterior loss;requires suctioning to control secretions;wet vocal quality  -RD       Mucosal Quality  sticky  -AC  sticky  -RD       Volitional Swallow  weak;unable to elicit  -AC  weak;unable to elicit  -RD       Volitional Cough  weak;reduced respiratory support  -AC  weak;reduced respiratory support  -RD          Oral Musculature and Cranial Nerve Assessment    Oral Motor General Assessment  generalized oral motor weakness  -AC  generalized oral motor weakness;lingual impairment;velar impairment;vocal impairment  -RD       Lingual  Impairment, Detail. Cranial Nerves IX, XII (Glossopharyngeal and Hypoglossal)  --  CN12: Motor Impairment;reduced lingual ROM;reduced strength;bilaterally  -RD       Velar Impairment, Detail, Cranial Nerves X, XI (Vagus and Accessory)  --  CN10/11: Motor Impairment;reduced velar strength  -RD       Vocal Impairment, Detail. Cranial Nerve X (Vagus)  --  CN10: Motor;impaired throat clear/cough (see comments);other (see comments) reduced resp support for cough/TC  -RD       Oral Motor, Comment  --  Pt w/ worsening dysarthria. 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 Strained/harsh vocal quality, breathy, monotone, hypernasality. Imprecise articulation.   -RD          General Eating/Swallowing Observations    Respiratory Support Currently in Use  room air  -AC  room air  -RD       Eating/Swallowing Skills  fed by SLP  -AC  fed by SLP  -RD       Positioning During Eating  upright in bed;needs frequent re-positioning;other (see comments) difficulty holding head upright  -AC  upright in bed;contracted;other (see comments) difficulty holding up head/neck 2' wkns  -RD       Utensils Used  spoon  -AC  spoon  -RD       Consistencies Trialed  thin liquids;nectar/syrup-thick liquids;pureed 1/2 tsp amounts  -AC  nectar/syrup-thick liquids;pureed DNT thins given baseline aspiration   -RD          Respiratory    Respiratory Status  --  increase in respiratory rate;during swallowing/eating  -RD          Clinical Swallow Eval    Oral Prep Phase  impaired  -AC  impaired  -RD       Oral Transit  impaired  -AC  impaired  -RD       Oral Residue  impaired  -AC  impaired  -RD       Pharyngeal Phase  suspected pharyngeal impairment  -AC  suspected pharyngeal impairment  -RD       Clinical Swallow Evaluation Summary  --  -AC  Clinical dysphagia eval complete. Pt/family report progressively worsening dysphagia/dysarthria. Pt w/ recent OP MBS 9/4/18 that revealed aspiration risk w/ thin liquids and recs for nectar-thick via tsp sized sips and  pureed diet. Family reports that pt was not consistently drinking thickened liquids at home. Family reports a weight loss of ~ 20 lbs recently as well as decreased speech intelligibility and worsening dysphagia. Pt presents w/ spastic/flaccid dysarthria (harsh/strained, breathy, monotone, hypernasal, imprecise articulation, etc.). Pt having to use notepad to write as speech is unintelligible. Family reports that pt has been seeing OP neurologist for bulbar symptoms and has been awaiting an appointment w/ Dr. Saini (neurologist) at [facility]. Pt/family have no confirmed diagnosis, but have been thinking ALS. Pt having difficulty managing secretions. Drooling. Set up suction kit to manage. Wet vocal quality at baseline. Trials given of nectar-thick via tsp and pureed. DNT thins/solid given baseline aspiration risk with these consistencies. Overt s/s of aspiration c/b incr'd wet vocal quality and coughing after all trials. Anterior loss of a majority of the bolus 2' oral/lingual wkns. Suspect severe oropharyngeal dysphagia. Discussed safest NPO w/ alternate nutrition. Pt has been expressing that she does not wish for tube feeding. Family would like palliative/hospice consult. SLP discussed s/s of aspiration, aspiration risk, and comfort diet w/ pt/family. SLP will f/u for further POC decisions. Ag PARRISH who is in agreement w/ recs and ordered palliative care.   -RD          Oral Prep Concerns    Oral Prep Concerns  anterior loss;incomplete bolus preparation;incomplete or weak lip closure around spoon  -AC  anterior loss;incomplete bolus preparation;incomplete or weak lip closure around spoon  -RD       Incomplete or Weak Lip Closure Around Spoon  all consistencies  -AC  nectar;pudding  -RD       Anterior Loss  all consistencies  -AC  nectar;pudding  -RD       Incomplete Bolus Preparation  all consistencies  -AC  nectar;pudding  -RD       Oral Prep Concerns, Comment  2' reduced labial seal and reduced lingual  strength/ROM  -AC  --          Oral Transit Concerns    Oral Transit Concerns  delayed initiation of bolus transit;increased oral transit time  -AC  delayed initiation of bolus transit;increased oral transit time  -RD       Delayed Intiation of Bolus Transit  all consistencies  -AC  nectar;pudding  -RD       Increased Oral Transit Time  all consistencies  -AC  nectar;pudding  -RD       Oral Transit Concerns, Comment  2' reduced lingual strength/ROM/coordination  -AC  --          Oral Residue Concerns    Oral Residue Concerns  diffuse residue throughout oral cavity  -AC  diffuse residue throughout oral cavity  -RD       Diffuse Residue Throughout Oral Cavity  all consistencies  -AC  nectar;pudding  -RD       Oral Residue Concerns, Comment  Moderate amount 2' reduced lingual strength/ROM. Removed w/ oral suctioning.   -AC  Suctioned oral residue via yanker  -RD          Pharyngeal Phase Concerns    Pharyngeal Phase Concerns  wet vocal quality;multiple swallows;cough  -AC  wet vocal quality;cough  -RD       Wet Vocal Quality  all consistencies  -AC  nectar;pudding  -RD       Multiple Swallows  all consistencies  -AC  --       Cough  all consistencies  -AC  nectar;pudding  -RD       Pharyngeal Phase Concerns, Comment  Pt cont to present w/ poor secretion management & severe overt clinical s/sxs aspiration w/ all consistencies tested. Spent extensive time discussing s/sxs aspiration, risks of aspiration, swallow prognosis, and options considering comfort/safety w/ pt & her dtr/POA. Pt stated understanding risks & clearly stated she wanted to cont to eat/drink, as it is still pleasurable for her to have PO in small amounts (1/2 tsp). Understands that aspirating all consistencies and high risk for malnutrition/dehydration. Pt reported she would have to think more about the possibility of a feeding tube. Considering PEG for back-up when too fatigued/uncomfortable to eat & for administration of medication.   -AC  Suspect  severe oropharyngeal dysphagia. Instrumental evaluation not indicated at this time. Pt/family requesting palliative/hospice consult. SLP will f/u for POC.   -RD          Clinical Impression    SLP Swallowing Diagnosis  mod-severe;oral dysfunction;suspected pharyngeal dysfunction;other (see comments) severe overt s/sxs aspiration  -AC  mod-severe;oral dysfunction;suspected pharyngeal dysfunction  -RD       Functional Impact  risk of aspiration/pneumonia;risk of malnutrition;risk of dehydration  -AC  risk of aspiration/pneumonia;risk of malnutrition;risk of dehydration  -RD       Rehab Potential/Prognosis, Swallowing  re-evaluate goals as necessary  -AC  re-evaluate goals as necessary  -RD       Swallow Criteria for Skilled Therapeutic Interventions Met  demonstrates skilled criteria  -AC  demonstrates skilled criteria  -RD          Recommendations    Therapy Frequency (Swallow)  --  evaluation only;PRN  -RD       Predicted Duration Therapy Intervention (Days)  until discharge  -AC  until discharge  -RD       SLP Diet Recommendation  other (see comments) safest NPO; may consider comfort diet, pending POC decisions  -AC  NPO;long term alternate methods of nutrition/hydration;other (see comments) if comfort diet: thins or nectar via tsp per pt and pureed   -RD       Recommended Diagnostics  --  other (see comments) f/u for POC decisions  -RD       Recommended Precautions and Strategies  --  other (see comments) Oral care/suctioning BID and PRN  -RD       SLP Rec. for Method of Medication Administration  --  meds via alternate route  -RD       Anticipated Dischage Disposition  unknown  -AC  unknown  -RD       Demonstrates Need for Referral to Another Service  other (see comments) consider SLP consult for AAC edu/training pending prognosis  -AC  other (see comments) Palliative care/hospice services (pt/family requested)  -RD         User Key  (r) = Recorded By, (t) = Taken By, (c) = Cosigned By    Initials Name Effective  Dates    AC Zulay Longoria, MS CCC-SLP 07/27/17 -     RD Yuliana Dugan, MS CCC-SLP 04/03/18 -           EDUCATION  The patient has been educated in the following areas:   Dysphagia (Swallowing Impairment) Oral Care/Hydration.    SLP Recommendation and Plan  SLP Swallowing Diagnosis: mod-severe, oral dysfunction, suspected pharyngeal dysfunction, other (see comments)(severe overt s/sxs aspiration)  SLP Diet Recommendation: other (see comments)(safest NPO; may consider comfort diet, pending POC decisions)              Swallow Criteria for Skilled Therapeutic Interventions Met: demonstrates skilled criteria  Anticipated Dischage Disposition: unknown  Rehab Potential/Prognosis, Swallowing: re-evaluate goals as necessary  Therapy Frequency (Swallow): PRN  Predicted Duration Therapy Intervention (Days): until discharge  Demonstrates Need for Referral to Another Service: other (see comments)(consider SLP consult for AAC edu/training pending prognosis)    Plan of Care Reviewed With: patient, daughter  Plan of Care Review  Plan of Care Reviewed With: patient, daughter  Plan for Continued Treatment (SLP): Following discussion w/ pt, her POA/dtr, & Dr. Leon, all agreed on comfort diet consisting of dysphagia level II diet (puree) and nectar-thick liquids (per pt's pref, though ok to have thin liquid if desired). Assist w/ PO, small 1/2 tsp-sized bites & sips, slow pace, provide oral suctioning PRN. Pt still deciding if would like to try to pursue PEG for back-up when too fatigued/uncomfortable to eat/drink and for medication administration. SLP will f/u for further edu, PRN. May consider SLP consult for communication/AAC education, pending overall prognosis.    SLP GOALS     Row Name 04/08/19 1000             Oral Nutrition/Hydration Goal 1 (SLP)    Oral Nutrition/Hydration Goal 1, SLP  LTG: Pt will tolerate desired comfort diet w/o reported concerns or signs of distress w/o cues.  -AC      Time Frame (Oral  Nutrition/Hydration Goal 1, SLP)  by discharge  -AC         Oral Nutrition/Hydration Goal 2 (SLP)    Oral Nutrition/Hydration Goal 2, SLP  Pt will tolerate desired comfort diet w/o concerns or s/sxs distress per SLP trials and/or pt/family/RN report 80% of the time.  -AC      Time Frame (Oral Nutrition/Hydration Goal 2, SLP)  short term goal (STG);by discharge  -        User Key  (r) = Recorded By, (t) = Taken By, (c) = Cosigned By    Initials Name Provider Type    Zulay Matos MS CCC-SLP Speech and Language Pathologist           SLP Outcome Measures (last 72 hours)      SLP Outcome Measures     Row Name 19 1600             SLP Outcome Measures    Outcome Measure Used?  Adult NOMS  -RD         Adult FCM Scores    FCM Chosen  Swallowing  -RD      Swallowing FCM Score  1  -RD        User Key  (r) = Recorded By, (t) = Taken By, (c) = Cosigned By    Initials Name Effective Dates     Yuliana Dugan, MS CCC-SLP 18 -            Time Calculation:   Time Calculation- SLP     Row Name 19 1406             Time Calculation- SLP    SLP Start Time  0900  -      SLP Received On  19  -        User Key  (r) = Recorded By, (t) = Taken By, (c) = Cosigned By    Initials Name Provider Type    Zulay Matos MS CCC-SLP Speech and Language Pathologist          Therapy Charges for Today     Code Description Service Date Service Provider Modifiers Qty    11084309380 HC ST EVAL ORAL PHARYNG SWALLOW 6 2019 Zulay Longoria MS CCC-SLP GN 1    [id number] HC ST TREATMENT SWALLOW 4 2019 Zulay Longoria MS CCC-SLP GN 1               Zulay Longoria MS CCC-SLP  2019 and Acute Care - Speech Language Pathology   Swallow Treatment Note  Ant     Patient Name: Omayra Bhardwaj  : 1935  MRN: [id number]  Today's Date: 2019               Admit Date: 2019    Visit Dx:      ICD-10-CM ICD-9-CM   1. Aspiration pneumonia, unspecified aspiration pneumonia type, unspecified  laterality, unspecified part of lung (CMS/[facility]) J69.0 507.0   2. ALS (amyotrophic lateral sclerosis) (CMS/[facility]) G12.21 335.20   3. Dysphagia, unspecified type R13.10 787.20   4. Oropharyngeal dysphagia R13.12 787.22     Patient Active Problem List   Diagnosis   • Hypertensive cardiovascular disease   • Mild episode of recurrent major depressive disorder (CMS/[facility])   • Chronic acquired lymphedema   • Thyroid nodule   • Hyperparathyroidism, primary (CMS/[facility])   • Disordered sleep   • Gastroesophageal reflux disease without esophagitis   • Difficulty with speech   • Allergic rhinitis   • Benign essential hypertension   • Dysphagia as late effect of cerebrovascular accident (CVA)   • Hypercholesterolemia   • Adhesive capsulitis of left shoulder   • Chronic diastolic CHF (congestive heart failure), NYHA class 2 (CMS/[facility])   • Bilateral edema of lower extremity   • Mild neurocognitive disorder   • Senile osteoporosis   • Progressive bulbar palsy (CMS/[facility])   • Risk for falls   • Multifactorial gait disorder   • Chronic kidney disease, stage 3 (moderate) (CMS/[facility])   • Chronic left shoulder pain   • Urge incontinence of urine   • Hypercalcemia   • Varicose veins of lower extremities without ulcer or inflammation   • Subarachnoid hemorrhage after traumatic injury without open intracranial wound, with prolonged loss of consciousness and return to pre-existing level of consciousness (CMS/[facility])   • Scaly patch rash   • Chronic pain of both knees   • Chronic bilateral low back pain without sciatica   • General weakness   • Urinary frequency   • Dysarthria   • Hypertensive heart and chronic kidney disease stage 3 (CMS/[facility])   • Obesity   • Shoulder pain   • Varicose veins of both lower extremities   • B12 deficiency   • Aspiration pneumonia (CMS/[facility])       Therapy Treatment  Rehabilitation Treatment Summary     Row Name 04/08/19 1000             Treatment Time/Intention    Discipline  speech language pathologist  -      Document Type   "therapy note (daily note);other (see comments) & caregiver instruction  -      Subjective Information  complains of;weakness;fatigue  -AC      Patient/Family Observations  Pt's dtr, Prabhu, present. Pt using written expression to communicate.  -AC      Care Plan Review  evaluation/treatment results reviewed;care plan/treatment goals reviewed;risks/benefits reviewed;current/potential barriers reviewed;patient/other agree to care plan  -      Care Plan Review, Other Participant(s)  daughter;durable/healthcare power of   -      Therapy Frequency (Swallow)  PRN  -AC      Patient Effort  good  -AC      Comment  Following discussion w/ MD, who ok'd initiating comfort diet if it is pt's wishes given that she stated understanding risks of aspiration, returned to pt's rm to discuss food textures/liquid viscosity/precautions. All agreed puree diet most appropriate @ this time. Clinically, thickened liquid does not appear \"safer\" for pt to swallow c/t thin liquid as she is likely aspirating all consistencies; however, pt reported nectar-thick liquid appeared to \"go down easier.\" Pt agreed to starting nectar-thick liquids via 1/2 tsp sips, but may have thin liquid as desired. Reviewed precautions, including: aggressive oral care, upright position, small bites/sips (1/2 tsp amounts), slow pace, oral suction PRN. Pt/dtr stated understanding.  -AC2      Recorded by [AC] Zulay Longoria, MS CCC-SLP 04/08/19 1357  [AC2] Zulay Longoria, MS CCC-SLP 04/08/19 1405      Row Name 04/08/19 1000             Pain Scale: FACES Pre/Post-Treatment    Pain: FACES Scale, Pretreatment  0-->no hurt  -AC      Pain: FACES Scale, Post-Treatment  0-->no hurt  -AC      Recorded by [AC] Zulay Longoria, MS CCC-SLP 04/08/19 1357      Row Name 04/08/19 1000             Outcome Summary/Treatment Plan (SLP)    Barriers to Overall Progress (SLP)  Poor secretions mgt, progressive dz.  -      Plan for Continued Treatment (SLP)  Following discussion " w/ pt, her POA/dtr, & Dr. Leon, all agreed on comfort diet consisting of dysphagia level II diet (puree) and nectar-thick liquids (per pt's pref, though ok to have thin liquid if desired). Assist w/ PO, small 1/2 tsp-sized bites & sips, slow pace, provide oral suctioning PRN. Pt still deciding if would like to try to pursue PEG for back-up when too fatigued/uncomfortable to eat/drink and for medication administration. SLP will f/u for further edu, PRN. May consider SLP consult for communication/AAC education, pending overall prognosis.  -AC      Anticipated Dischage Disposition  unknown  -AC      Recorded by [AC] Zulay Longoria, MS CCC-SLP 04/08/19 5498        User Key  (r) = Recorded By, (t) = Taken By, (c) = Cosigned By    Initials Name Effective Dates Discipline    AC Zulay Longoria, MS CCC-SLP 07/27/17 -  SLP          Outcome Summary  Outcome Summary/Treatment Plan (SLP)  Barriers to Overall Progress (SLP): Poor secretions mgt, progressive dz. (04/08/19 1000 : Zulay Longoria, MS CCC-SLP)  Plan for Continued Treatment (SLP): Following discussion w/ pt, her POA/dtr, & Dr. Leon, all agreed on comfort diet consisting of dysphagia level II diet (puree) and nectar-thick liquids (per pt's pref, though ok to have thin liquid if desired). Assist w/ PO, small 1/2 tsp-sized bites & sips, slow pace, provide oral suctioning PRN. Pt still deciding if would like to try to pursue PEG for back-up when too fatigued/uncomfortable to eat/drink and for medication administration. SLP will f/u for further edu, PRN. May consider SLP consult for communication/AAC education, pending overall prognosis. (04/08/19 1000 : Zulay Longoria, MS CCC-SLP)  Anticipated Dischage Disposition: unknown (04/08/19 1000 : Zulay Longoria, MS CCC-SLP)  Demonstrates Need for Referral to Another Service: other (see comments)(consider SLP consult for AAC edu/training pending prognosis) (04/08/19 0900 : Zulay Longoria, MS CCC-SLP)      SLP GOALS     Row  Name 04/08/19 1000             Oral Nutrition/Hydration Goal 1 (SLP)    Oral Nutrition/Hydration Goal 1, SLP  LTG: Pt will tolerate desired comfort diet w/o reported concerns or signs of distress w/o cues.  -AC      Time Frame (Oral Nutrition/Hydration Goal 1, SLP)  by discharge  -AC         Oral Nutrition/Hydration Goal 2 (SLP)    Oral Nutrition/Hydration Goal 2, SLP  Pt will tolerate desired comfort diet w/o concerns or s/sxs distress per SLP trials and/or pt/family/RN report 80% of the time.  -AC      Time Frame (Oral Nutrition/Hydration Goal 2, SLP)  short term goal (STG);by discharge  -        User Key  (r) = Recorded By, (t) = Taken By, (c) = Cosigned By    Initials Name Provider Type    Zulay Matos MS CCC-SLP Speech and Language Pathologist          EDUCATION  The patient has been educated in the following areas:   Dysphagia (Swallowing Impairment) Oral Care/Hydration Modified Diet Instruction.    SLP Recommendation and Plan     Barriers to Overall Progress (SLP): Poor secretions mgt, progressive dz.  Plan for Continued Treatment (SLP): Following discussion w/ pt, her POA/dtr, & Dr. Leon, all agreed on comfort diet consisting of dysphagia level II diet (puree) and nectar-thick liquids (per pt's pref, though ok to have thin liquid if desired). Assist w/ PO, small 1/2 tsp-sized bites & sips, slow pace, provide oral suctioning PRN. Would recommend medications crushed w/ puree/pudding or via PEG if pt elects to pursue feeding tube. Pt still deciding if would like to try to pursue PEG for back-up when too fatigued/uncomfortable to eat/drink and for medication administration. SLP will f/u for further edu, PRN. May consider SLP consult for communication/AAC education, pending overall prognosis.  Anticipated Dischage Disposition: unknown     Demonstrates Need for Referral to Another Service: other (see comments)(consider SLP consult for AAC edu/training pending prognosis)          SLP Outcome Measures  (last 72 hours)      SLP Outcome Measures     Row Name 04/06/19 1600             SLP Outcome Measures    Outcome Measure Used?  Adult NOMS  -RD         Adult FCM Scores    FCM Chosen  Swallowing  -RD      Swallowing FCM Score  1  -RD        User Key  (r) = Recorded By, (t) = Taken By, (c) = Cosigned By    Initials Name Effective Dates    RD Marco AntonioYuliana stringer, MS CCC-SLP 04/03/18 -              Time Calculation:   Time Calculation- SLP     Row Name 04/08/19 1406             Time Calculation- SLP    SLP Start Time  0900  -      SLP Received On  04/08/19  -        User Key  (r) = Recorded By, (t) = Taken By, (c) = Cosigned By    Initials Name Provider Type     Zulay Longoria, MS CCC-SLP Speech and Language Pathologist          Therapy Charges for Today     Code Description Service Date Service Provider Modifiers Qty    76642313071 HC ST EVAL ORAL PHARYNG SWALLOW 6 4/8/2019 Zulay Longoria MS CCC-SLP GN 1    23633104854 HC ST TREATMENT SWALLOW 4 4/8/2019 Zulay Longoria MS CCC-SLP GN 1          Zulay Longoria MS CCC-SLP  4/8/2019

## 2019-04-08 NOTE — PROGRESS NOTES
Palliative Care Progress Note    Date of Admission: 4/6/2019    Subjective: Patient states that she is having some neck pain, family does state that she has this periodically when her neck is not being supported.  Current Code Status     Date Active Code Status Order ID Comments User Context       4/7/2019 13:34 No CPR [id number]  Jeet Back, DO Inpatient       Questions for Current Code Status     Question Answer Comment    Code Status No CPR     Medical Interventions (Level of Support Prior to Arrest) Limited     Limited Support to NOT Include Intubation      Cardioversion/Defibrillation      Antiarrhythmic Drugs      Vasopressors         No current facility-administered medications on file prior to encounter.      Current Outpatient Medications on File Prior to Encounter   Medication Sig Dispense Refill   • acebutolol (SECTRAL) 200 MG capsule TAKE (1) CAPSULE BY MOUTH DAILY. 30 capsule 6   • atorvastatin (LIPITOR) 10 MG tablet Take 1/2 tablet (dose = 5 mg) nightly     • B Complex Vitamins (VITAMIN B COMPLEX PO) Take  by mouth Daily.     • Cholecalciferol (VITAMIN D3) 2000 units chewable tablet Chew 1 tablet Daily.     • diphenhydrAMINE (BENADRYL) 2 % cream Apply  topically to the appropriate area as directed.     • Elastic Bandages & Supports (FUTURO SOFT CERVICAL COLLAR) misc 1 Device Daily. 1 each 0   • famotidine (PEPCID) 20 MG tablet Take 20 mg by mouth 2 (Two) Times a Day.     • lisinopril (PRINIVIL,ZESTRIL) 5 MG tablet Take 5 mg by mouth Daily.     • torsemide (DEMADEX) 10 MG tablet Take 5 mg by mouth Daily.     • triamcinolone (KENALOG) 0.5 % cream Apply  topically to the appropriate area as directed Every 12 (Twelve) Hours.     • vitamin B-12 (CYANOCOBALAMIN) 100 MCG tablet Take  by mouth.          Morphine **AND** naloxone  •  sodium chloride  •  [COMPLETED] Insert peripheral IV **AND** sodium chloride  •  sodium chloride    Objective: /63 (BP Location: Left arm, Patient Position: Sitting)    "Pulse 62   Temp 97.2 °F (36.2 °C) (Axillary)   Resp 18   Ht 162.6 cm (64.02\")   Wt 64.8 kg (142 lb 12.8 oz)   LMP  (LMP Unknown)   SpO2 92%   BMI 24.50 kg/m²      Intake/Output Summary (Last 24 hours) at 4/8/2019 1413  Last data filed at 4/8/2019 1239  Gross per 24 hour   Intake --   Output 250 ml   Net -250 ml     Physical Exam:      General Appearance:    Alert, cooperative, upper airway congestion noted, patient unable to hold her head up.   Head:    Normocephalic, without obvious abnormality, atraumatic   Eyes:            Lids and lashes normal, conjunctivae and sclerae normal, no   icterus, no pallor, corneas clear, PERRLA   Ears:    Ears appear intact with no abnormalities noted   Throat:   No oral lesions, no thrush, oral mucosa moist   Neck:   No adenopathy, supple, trachea midline, no thyromegaly, no     carotid bruit, no JVD   Back:     No kyphosis present, no scoliosis present, no skin lesions,       erythema or scars, no tenderness to percussion or                   palpation,   range of motion normal   Lungs:     Clear to auscultation,respirations regular, even and                   unlabored    Heart:    Regular rhythm and normal rate, normal S1 and S2, no            murmur, no gallop, no rub, no click   Breast Exam:    Deferred   Abdomen:     Normal bowel sounds, no masses, no organomegaly, soft        non-tender, non-distended, no guarding, no rebound                 tenderness   Genitalia:    Deferred   Extremities:   Moves all extremities well, no edema, no cyanosis, no              redness   Pulses:   Pulses palpable and equal bilaterally   Skin:   No bleeding, bruising or rash   Lymph nodes:   No palpable adenopathy   Neurologic:   Cranial nerves 2 - 12 grossly intact, sensation intact, DTR        present and equal bilaterally     Results from last 7 days   Lab Units 04/07/19  1013   WBC 10*3/mm3 7.81   HEMOGLOBIN g/dL 13.6   HEMATOCRIT % 42.5   PLATELETS 10*3/mm3 126*     Results from " last 7 days   Lab Units 04/06/19  1146   SODIUM mmol/L 144   POTASSIUM mmol/L 3.6   CHLORIDE mmol/L 106   CO2 mmol/L 25.0   BUN mg/dL 18   CREATININE mg/dL 0.74   CALCIUM mg/dL 10.8*   BILIRUBIN mg/dL 0.8   ALK PHOS U/L 83   ALT (SGPT) U/L 15   AST (SGOT) U/L 25   GLUCOSE mg/dL 79       Impression: PNA  Dysphagia  ALS  Dyspnea  GOC      Plan: Continue to have discussions patient about her desire as far as tube feeds are concerned.  She is interested in talking to GI.            Jeet Back,   04/08/19  2:13 PM

## 2019-04-09 ENCOUNTER — APPOINTMENT (OUTPATIENT)
Dept: GENERAL RADIOLOGY | Facility: HOSPITAL | Age: 84
End: 2019-04-09

## 2019-04-09 LAB
ANION GAP SERPL CALCULATED.3IONS-SCNC: 20 MMOL/L
BUN BLD-MCNC: 17 MG/DL (ref 8–23)
BUN/CREAT SERPL: 23.9 (ref 7–25)
CALCIUM SPEC-SCNC: 10 MG/DL (ref 8.6–10.5)
CHLORIDE SERPL-SCNC: 100 MMOL/L (ref 98–107)
CO2 SERPL-SCNC: 20 MMOL/L (ref 22–29)
CREAT BLD-MCNC: 0.71 MG/DL (ref 0.57–1)
DEPRECATED RDW RBC AUTO: 47.1 FL (ref 37–54)
ERYTHROCYTE [DISTWIDTH] IN BLOOD BY AUTOMATED COUNT: 13.8 % (ref 12.3–15.4)
GFR SERPL CREATININE-BSD FRML MDRD: 79 ML/MIN/1.73
GLUCOSE BLD-MCNC: 74 MG/DL (ref 65–99)
HCT VFR BLD AUTO: 40.4 % (ref 34–46.6)
HGB BLD-MCNC: 13 G/DL (ref 12–15.9)
MCH RBC QN AUTO: 29.9 PG (ref 26.6–33)
MCHC RBC AUTO-ENTMCNC: 32.2 G/DL (ref 31.5–35.7)
MCV RBC AUTO: 92.9 FL (ref 79–97)
PLATELET # BLD AUTO: 165 10*3/MM3 (ref 140–450)
PMV BLD AUTO: 11.1 FL (ref 6–12)
POTASSIUM BLD-SCNC: 3.6 MMOL/L (ref 3.5–5.2)
RBC # BLD AUTO: 4.35 10*6/MM3 (ref 3.77–5.28)
SODIUM BLD-SCNC: 140 MMOL/L (ref 136–145)
WBC NRBC COR # BLD: 6.65 10*3/MM3 (ref 3.4–10.8)

## 2019-04-09 PROCEDURE — 92523 SPEECH SOUND LANG COMPREHEN: CPT

## 2019-04-09 PROCEDURE — 99233 SBSQ HOSP IP/OBS HIGH 50: CPT | Performed by: HOSPITALIST

## 2019-04-09 PROCEDURE — 85027 COMPLETE CBC AUTOMATED: CPT | Performed by: HOSPITALIST

## 2019-04-09 PROCEDURE — 80048 BASIC METABOLIC PNL TOTAL CA: CPT | Performed by: HOSPITALIST

## 2019-04-09 PROCEDURE — 99231 SBSQ HOSP IP/OBS SF/LOW 25: CPT | Performed by: INTERNAL MEDICINE

## 2019-04-09 PROCEDURE — 92507 TX SP LANG VOICE COMM INDIV: CPT

## 2019-04-09 PROCEDURE — 25010000002 HEPARIN (PORCINE) PER 1000 UNITS: Performed by: INTERNAL MEDICINE

## 2019-04-09 PROCEDURE — 71045 X-RAY EXAM CHEST 1 VIEW: CPT

## 2019-04-09 PROCEDURE — 25010000002 PIPERACILLIN SOD-TAZOBACTAM PER 1 G: Performed by: INTERNAL MEDICINE

## 2019-04-09 RX ADMIN — LISINOPRIL 5 MG: 5 TABLET ORAL at 08:17

## 2019-04-09 RX ADMIN — SODIUM CHLORIDE, PRESERVATIVE FREE 3 ML: 5 INJECTION INTRAVENOUS at 20:22

## 2019-04-09 RX ADMIN — TAZOBACTAM SODIUM AND PIPERACILLIN SODIUM 4.5 G: 500; 4 INJECTION, SOLUTION INTRAVENOUS at 01:23

## 2019-04-09 RX ADMIN — ACEBUTOLOL HYDROCHLORIDE 200 MG: 200 CAPSULE ORAL at 08:17

## 2019-04-09 RX ADMIN — HEPARIN SODIUM 5000 UNITS: 5000 INJECTION INTRAVENOUS; SUBCUTANEOUS at 20:23

## 2019-04-09 RX ADMIN — HEPARIN SODIUM 5000 UNITS: 5000 INJECTION INTRAVENOUS; SUBCUTANEOUS at 08:17

## 2019-04-09 RX ADMIN — SODIUM CHLORIDE, PRESERVATIVE FREE 3 ML: 5 INJECTION INTRAVENOUS at 08:17

## 2019-04-09 RX ADMIN — TAZOBACTAM SODIUM AND PIPERACILLIN SODIUM 4.5 G: 500; 4 INJECTION, SOLUTION INTRAVENOUS at 08:19

## 2019-04-09 RX ADMIN — TORSEMIDE 5 MG: 10 TABLET ORAL at 08:16

## 2019-04-09 RX ADMIN — ATORVASTATIN CALCIUM 10 MG: 10 TABLET, FILM COATED ORAL at 20:23

## 2019-04-09 RX ADMIN — TAZOBACTAM SODIUM AND PIPERACILLIN SODIUM 4.5 G: 500; 4 INJECTION, SOLUTION INTRAVENOUS at 16:12

## 2019-04-09 NOTE — PLAN OF CARE
Problem: Patient Care Overview  Goal: Plan of Care Review  Outcome: Ongoing (interventions implemented as appropriate)   04/09/19 0860   Coping/Psychosocial   Plan of Care Reviewed With patient;family   Plan of Care Review   Progress no change   OTHER   Outcome Summary VSS, pt decided against peg tube for feedings, pt and family discussed optiions with hospice at home, pt denies pain      Goal: Individualization and Mutuality  Outcome: Ongoing (interventions implemented as appropriate)    Goal: Discharge Needs Assessment  Outcome: Ongoing (interventions implemented as appropriate)    Goal: Interprofessional Rounds/Family Conf  Outcome: Ongoing (interventions implemented as appropriate)      Problem: Palliative Care (Adult)  Goal: Identify Related Risk Factors and Signs and Symptoms  Outcome: Ongoing (interventions implemented as appropriate)    Goal: Maximized Comfort  Outcome: Ongoing (interventions implemented as appropriate)    Goal: Enhanced Quality of Life  Outcome: Ongoing (interventions implemented as appropriate)

## 2019-04-09 NOTE — PROGRESS NOTES
[facility] Medicine Services  PROGRESS NOTE    Patient Name: Omayra Bhardwaj  : 1935  MRN: [id number]    Date of Admission: 2019  Length of Stay: 3  Primary Care Physician: Suzanna White MD    Subjective   Subjective     CC:  Aspiration PNA / ALS    HPI:      Still coughing. Denies shortness of breath. Denies pain. Waiting on daughter to return.    Review of Systems    Unable to assess    Otherwise ROS is negative except as mentioned in the HPI.    Objective   Objective     Vital Signs:   Temp:  [97.9 °F (36.6 °C)] 97.9 °F (36.6 °C)  Resp:  [16] 16        Physical Exam:    NAD, unintelligible speech, no family at bedside, writing in notebook  OP clear, MMM  Neck supple  No LAD  RRR  Diminished but no coarse breath sounds  +BS, ND, NT  BANDA, but weak  No rashes  Normal affect    Results Reviewed:  I have personally reviewed current lab, radiology, and data and agree.    Results from last 7 days   Lab Units 19  0553 19  1013 19  1146   WBC 10*3/mm3 6.65 7.81 5.87   HEMOGLOBIN g/dL 13.0 13.6 14.1   HEMATOCRIT % 40.4 42.5 43.0   PLATELETS 10*3/mm3 165 126* 149*     Results from last 7 days   Lab Units 19  0553 19  1154 19  1146   SODIUM mmol/L 140  --  144   POTASSIUM mmol/L 3.6  --  3.6   CHLORIDE mmol/L 100  --  106   CO2 mmol/L 20.0*  --  25.0   BUN mg/dL 17  --  18   CREATININE mg/dL 0.71  --  0.74   GLUCOSE mg/dL 74  --  79   CALCIUM mg/dL 10.0  --  10.8*   ALT (SGPT) U/L  --   --  15   AST (SGOT) U/L  --   --  25   TROPONIN I ng/mL  --  0.00  --      Estimated Creatinine Clearance: 54.5 mL/min (by C-G formula based on SCr of 0.71 mg/dL).    BNP   Date Value Ref Range Status   2019 50.0 0.0 - 100.0 pg/mL Final     Comment:     Results may be falsely decreased if patient taking Biotin.       Microbiology Results Abnormal     Procedure Component Value - Date/Time    Blood Culture - Blood, Arm, Right [[id number]] Collected:   04/06/19 1135    Lab Status:  Preliminary result Specimen:  Blood from Arm, Right Updated:  04/08/19 1215     Blood Culture No growth at 2 days    Blood Culture - Blood, Arm, Left [[id number]] Collected:  04/06/19 1146    Lab Status:  Preliminary result Specimen:  Blood from Arm, Left Updated:  04/08/19 1215     Blood Culture No growth at 2 days          Imaging Results (last 24 hours)     Procedure Component Value Units Date/Time    XR Chest 1 View [[id number]] Collected:  04/09/19 0923     Updated:  04/09/19 0923    Narrative:       EXAMINATION: XR CHEST 1 VW-      INDICATION: Dyspnea, on exertion.      COMPARISON: 04/06/2019.     FINDINGS: Cardiac size borderline enlarged and unchanged. Chronic  changes of the lungs including hyperinflated appearance without focal  consolidation or opacification. No pneumothorax or significant effusion.            Impression:       No significant interval change. No new parenchymal disease.     D:  04/09/2019  E:  04/09/2019       CT Chest Without Contrast [[id number]] Collected:  04/06/19 1342     Updated:  04/08/19 1145    Narrative:       EXAMINATION: CT CHEST WO CONTRAST - 04/06/2019     INDICATION: Cough. Evaluate for pneumonia.     TECHNIQUE: Spiral acquisition 5 mm unenhanced images through the chest.     The radiation dose reduction device was turned on for each scan per the  ALARA (As Low as Reasonably Achievable) protocol.     COMPARISON: 07/15/2017 chest CT scan. Portable chest radiograph of  today's date.     FINDINGS: Lungs appear practically clear. In the left lower lobe, there  is some peribronchial thickening, perhaps a few opacified bronchi, and  minimal airspace disease which may represent a focal bronchitis or the  very earliest changes of pneumonia. No similar findings are seen  elsewhere. There is no pleural effusion. Mediastinal window images show  patient's large hiatal hernia. There is no evidence of significant  adenopathy and no pericardial effusion.  Included images of the upper  abdomen show a bland-appearing left upper pole renal cyst. Included  portions of the spleen, gallbladder, liver, pancreatic tail, adrenal  glands and right upper renal pole appear unremarkable.       Impression:       Very mild posterior left lower lobe disease, new from prior  study, possibly early changes of pneumonia or bronchitis, or in a  patient of this age, mild changes of aspiration. No other evidence of  active chest disease elsewhere.     DICTATED:   04/06/2019  EDITED/ls :   04/06/2019      This report was finalized on 4/8/2019 11:42 AM by DR. Dawood Grajeda MD.             Results for orders placed during the hospital encounter of 07/15/17   Adult Transthoracic Echo Complete    Narrative · Left ventricular systolic function is normal. Estimated EF = 70%.  · Left ventricular diastolic dysfunction (grade I) consistent with   impaired relaxation.  · Right ventricular cavity is mildly dilated.  · Normal right ventricular wall thickness, systolic function and septal   motion noted with right ventricular cavity mildly dilated.  · No evidence of pulmonary hypertension is present.  · There is no evidence of pericardial effusion.  · No significant structural valvular abnormality demonstrated.          I have reviewed the medications:    Current Facility-Administered Medications:   •  acebutolol (SECTRAL) capsule 200 mg, 200 mg, Oral, Q24H, Carlo Arias MD, 200 mg at 04/09/19 0817  •  atorvastatin (LIPITOR) tablet 10 mg, 10 mg, Oral, Nightly, Carlo Arias MD, 10 mg at 04/08/19 2132  •  heparin (porcine) 5000 UNIT/ML injection 5,000 Units, 5,000 Units, Subcutaneous, Q12H, Carlo Arias MD, 5,000 Units at 04/09/19 0817  •  lisinopril (PRINIVIL,ZESTRIL) tablet 5 mg, 5 mg, Oral, Daily, Carlo Arias MD, 5 mg at 04/09/19 0817  •  Morphine sulfate (PF) injection 1 mg, 1 mg, Intravenous, Q4H PRN **AND** naloxone (NARCAN) injection 0.4 mg, 0.4 mg, Intravenous, Q5 Min PRN,  Carlo Arias MD  •  piperacillin-tazobactam (ZOSYN) 4.5 g in iso-osmotic dextrose 100 mL IVPB (premix), 4.5 g, Intravenous, Q8H, Carlo Arias MD, 4.5 g at 04/09/19 0819  •  sodium chloride 0.9 % flush 10 mL, 10 mL, Intravenous, PRN, Parveen Gill MD  •  [COMPLETED] Insert peripheral IV, , , Once **AND** sodium chloride 0.9 % flush 10 mL, 10 mL, Intravenous, PRN, Yazan Pugh APRN  •  sodium chloride 0.9 % flush 3 mL, 3 mL, Intravenous, Q12H, Carlo Arias MD, 3 mL at 04/09/19 0817  •  sodium chloride 0.9 % flush 3-10 mL, 3-10 mL, Intravenous, PRN, Carlo Arias MD  •  torsemide (DEMADEX) tablet 5 mg, 5 mg, Oral, Daily, Carlo Arias MD, 5 mg at 04/09/19 0816      Assessment/Plan   Assessment / Plan     Active Hospital Problems    Diagnosis POA   • Aspiration pneumonia (CMS/[facility]) [J69.0] Yes          Brief Hospital Course to date:  Omayra Bhardwaj is a 83 y.o. 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     *Dysphagia     * HTN     * HLP        DVT Prophylaxis:  Heparin SC    Disposition: I expect the patient to be discharged TBD    CODE STATUS:   Code Status and Medical Interventions:   Ordered at: 04/07/19 1334     Limited Support to NOT Include:    Intubation    Cardioversion/Defibrillation    Antiarrhythmic Drugs    Vasopressors     Code Status:    No CPR     Medical Interventions (Level of Support Prior to Arrest):    Limited         Electronically signed by Dawood Leon MD, 04/09/19, 10:22 AM.

## 2019-04-09 NOTE — PLAN OF CARE
Problem: Skin Injury Risk (Adult)  Goal: Identify Related Risk Factors and Signs and Symptoms  Outcome: Ongoing (interventions implemented as appropriate)      Problem: Fall Risk (Adult)  Goal: Identify Related Risk Factors and Signs and Symptoms  Outcome: Ongoing (interventions implemented as appropriate)      Problem: Patient Care Overview  Goal: Plan of Care Review  Outcome: Ongoing (interventions implemented as appropriate)   04/08/19 1821 04/09/19 0400 04/09/19 0549   Coping/Psychosocial   Plan of Care Reviewed With --  patient;daughter --    Plan of Care Review   Progress improving --  --    OTHER   Outcome Summary --  --  Vss, hospice consult, making decision on peg vs. comfort feedings. patient denies pain.       Problem: Palliative Care (Adult)  Goal: Identify Related Risk Factors and Signs and Symptoms  Outcome: Ongoing (interventions implemented as appropriate)

## 2019-04-09 NOTE — THERAPY EVALUATION
Acute Care - Speech Language Pathology Initial Evaluation  [facility]     Patient Name: Omayra Bhardwaj  : 1935  MRN: [id number]  Today's Date: 2019               Admit Date: 2019     Visit Dx:    ICD-10-CM ICD-9-CM   1. Aspiration pneumonia, unspecified aspiration pneumonia type, unspecified laterality, unspecified part of lung (CMS/[facility]) J69.0 507.0   2. ALS (amyotrophic lateral sclerosis) (CMS/[facility]) G12.21 335.20   3. Dysphagia, unspecified type R13.10 787.20   4. Oropharyngeal dysphagia R13.12 787.22     Patient Active Problem List   Diagnosis   • Hypertensive cardiovascular disease   • Mild episode of recurrent major depressive disorder (CMS/[facility])   • Chronic acquired lymphedema   • Thyroid nodule   • Hyperparathyroidism, primary (CMS/[facility])   • Disordered sleep   • Gastroesophageal reflux disease without esophagitis   • Difficulty with speech   • Allergic rhinitis   • Benign essential hypertension   • Dysphagia as late effect of cerebrovascular accident (CVA)   • Hypercholesterolemia   • Adhesive capsulitis of left shoulder   • Chronic diastolic CHF (congestive heart failure), NYHA class 2 (CMS/[facility])   • Bilateral edema of lower extremity   • Mild neurocognitive disorder   • Senile osteoporosis   • Progressive bulbar palsy (CMS/[facility])   • Risk for falls   • Multifactorial gait disorder   • Chronic kidney disease, stage 3 (moderate) (CMS/[facility])   • Chronic left shoulder pain   • Urge incontinence of urine   • Hypercalcemia   • Varicose veins of lower extremities without ulcer or inflammation   • Subarachnoid hemorrhage after traumatic injury without open intracranial wound, with prolonged loss of consciousness and return to pre-existing level of consciousness (CMS/[facility])   • Scaly patch rash   • Chronic pain of both knees   • Chronic bilateral low back pain without sciatica   • General weakness   • Urinary frequency   • Dysarthria   • Hypertensive heart and chronic kidney disease stage 3 (CMS/[facility])   •  Obesity   • Shoulder pain   • Varicose veins of both lower extremities   • B12 deficiency   • Aspiration pneumonia (CMS/HCC)     Past Medical History:   Diagnosis Date   • Chest pain 05/2015    LEXISCAN STRESS TEST    • Chronic hypertension     probably essential.     • Chronic lymphedema     with probable chronic lower extremity venous insufficiency with recent acceptable bilateral lower extremity venous duplex study, March 2013.   • Depression    • Disordered sleep     with prominent snoring and occasional nocturnal hypersomnolence.    • Dyslipidemia      untreated.   • Former tobacco use    • GERD (gastroesophageal reflux disease)    • History of obesity     (BMI 29.5).   • History of obesity     (BMI 29.5).   • Hypercalcemia 2010   • Hyperlipidemia    • Hyperparathyroidism (CMS/HCC)     with contemplated parathyroidectomy (Dr. Davis, [facility]).   • Hyperparathyroidism (CMS/HCC) 2013    HYPERPARATHYROID- BENIGN BIOPSY 3 THYROID NODULES- WATCHFUL WAITING- DR. DIANE JON AT     • Hypertension    • Hypertensive cardiovascular disease     probable   • Obesity, Class I, BMI 30-34.9 1/17/2019   • Rib pain on left side 07/28/2017    RIB PAIN ON LEFT SIDE    • Skin cancer 2010    SQAMOUS CELL SKIN CANCER- RESECTED    • Staph infection     L FINGER AFTER STICTCHES REMOVED    • Subarachnoid hemorrhage, traumatic (CMS/HCC) 07/15/2017    SUPPORTIVE CARE IN ICU, THEN HOME HEALTH WITH PT AND OT   • Syncope 1/29/2018   • Thyroid nodule     workup pending.   • UTI (urinary tract infection) 7/15/2017     Past Surgical History:   Procedure Laterality Date   • APPENDECTOMY  1945   • BREAST BIOPSY Left    • BREAST EXCISIONAL BIOPSY Left    • CATARACT EXTRACTION  2010    bilateral    • OTHER SURGICAL HISTORY  1986    Laparoscopy   • PARATHYROIDECTOMY     • SQUAMOUS CELL CARCINOMA EXCISION  2010   • TONSILLECTOMY  1942        SLP EVALUATION (last 72 hours)      SLP SLC Evaluation     Row Name 04/09/19  1520                   Communication Assessment/Intervention    Document Type  evaluation  -MP        Subjective Information  no complaints  -MP        Patient Observations  alert;cooperative  -MP        Patient/Family Observations  Dtrs present  -MP        Patient Effort  good  -MP           General Information    Patient Profile Reviewed  yes  -MP        Pertinent History Of Current Problem  Admitted w/ aspiration PNA. Dx w/ bulbar ALS. Appears now pt/family wishing to pursue hospice care. Communication eval indicated 2' pt's severe dysarthria and need for education in regards to AAC.  -MP        Precautions/Limitations, Vision  WFL with corrective lenses  -MP        Precautions/Limitations, Hearing  WFL;for purposes of eval  -MP        Prior Level of Function-Communication  motor speech impairment  -MP        Plans/Goals Discussed with  patient;family;agreed upon  -MP        Barriers to Rehab  medically complex  -MP           Pain Assessment    Additional Documentation  Pain Scale: FACES Pre/Post-Treatment (Group)  -MP           Pain Scale: FACES Pre/Post-Treatment    Pain: FACES Scale, Pretreatment  2-->hurts little bit  -MP        Pain: FACES Scale, Post-Treatment  2-->hurts little bit  -MP           Comprehension Assessment/Intervention    Comprehension Assessment/Intervention  Auditory Comprehension  -MP           Auditory Comprehension Assessment/Intervention    Auditory Comprehension (Communication)  WFL  -MP           Expression Assessment/Intervention    Expression Assessment/Intervention  verbal expression;graphic expression  -MP           Verbal Expression Assessment/Intervention    Verbal Expression  WFL  -MP           Graphic Expression Assessment/Intervention    Graphic Expression  WFL  -MP        Graphic Expression, Comment  Pt utilizing pen/paper for communication. Dtr reported she had purchased boogie board for pt  -MP           Motor Speech Assessment/Intervention    Motor Speech Function  severe  impairment  -MP        Motor Speech, Comment  Severe spastic-flaccid dysarthria. Speech unintellgible.  -MP           Augmentative/Alternative Communication    Alternative Communication, Comment  Pt currently utilizing pen/paper for communication. Provided extensive education to pt/family in regards to AAC options (phone apps, tablets, low-tech options such as paper boards, etc)  -MP           SLP Clinical Impressions    SLP Diagnosis  Severe spastic-flaccid dysarthria  -MP        SLC Criteria for Skilled Therapy Interventions Met  yes  -MP        Functional Impact  difficulty communicating wants, needs;difficulty in expressing complex messages;difficulty communicating in an emergency  -MP           Recommendations    Therapy Frequency (SLP SLC)  PRN  -MP        Predicted Duration Therapy Intervention (Days)  until discharge  -MP        Anticipated Dischage Disposition  other (see comments) assisted living w/ hospice  -MP          User Key  (r) = Recorded By, (t) = Taken By, (c) = Cosigned By    Initials Name Effective Dates    Ran Gilbert, MS, CFY-SLP 08/15/18 -              EDUCATION  The patient has been educated in the following areas:     Communication Impairment.    SLP Recommendation and Plan  SLP Diagnosis: Severe spastic-flaccid dysarthria     SLC Criteria for Skilled Therapy Interventions Met: yes  SLP Diagnosis: Severe spastic-flaccid dysarthria  Anticipated Dischage Disposition: other (see comments)(assisted living w/ hospice)  Therapy Frequency (Swallow): PRN  Predicted Duration Therapy Intervention (Days): until discharge    Plan of Care Reviewed With: patient, family  Plan of Care Review  Plan of Care Reviewed With: patient, family  Daily Summary of Progress (SLP): progress toward functional goals as expected      SLP GOALS     Row Name 04/09/19 1515 04/08/19 1000          Oral Nutrition/Hydration Goal 1 (SLP)    Oral Nutrition/Hydration Goal 1, SLP  --  LTG: Pt will tolerate desired comfort  diet w/o reported concerns or signs of distress w/o cues.  -AC     Time Frame (Oral Nutrition/Hydration Goal 1, SLP)  --  by discharge  -AC        Oral Nutrition/Hydration Goal 2 (SLP)    Oral Nutrition/Hydration Goal 2, SLP  --  Pt will tolerate desired comfort diet w/o concerns or s/sxs distress per SLP trials and/or pt/family/RN report 80% of the time.  -AC     Time Frame (Oral Nutrition/Hydration Goal 2, SLP)  --  short term goal (STG);by discharge  -AC        Augmentative/Alternative Communication Objectives Goal 1 (SLP    Communication (Augmentative/Alternative Communication Goal 1, SLP)  improve ability to use non-verbal communication strategies;improve ability to use low tech augmentative/alternative communication device  -MP  --     Improve Communication by (Augmentative/Alternative Communication Goal 1, SLP)  alphabet/picture board;low tech communication device;use communication device to express short 1-3 word phrases;use communication device to express sentences;80%;with minimal cues (75-90%);other (see comments) if pt/family wishing to pursue high-tech AAC device  -MP  --     Time Frame (Augmentative/Alternative Communication Goal 1, SLP)  short term goal (STG);by discharge  -MP  --        Additional Goal 1 (SLP)    Additional Goal 1, SLP  LTG: Pt will utilize augmentative/alternative communication to communicate wants/needs w/ 100% acc and no cues  -MP  --     Time Frame (Additional Goal 1, SLP)  by discharge  -MP  --       User Key  (r) = Recorded By, (t) = Taken By, (c) = Cosigned By    Initials Name Provider Type    AC Zulay Longoria MS CCC-SLP Speech and Language Pathologist    Ran Gilbert MS, CFY-SLP Speech and Language Pathologist                  Time Calculation:     Time Calculation- SLP     Row Name 04/09/19 1614             Time Calculation- SLP    SLP Start Time  1515  -MP      SLP Received On  04/09/19  -MP        User Key  (r) = Recorded By, (t) = Taken By, (c) = Cosigned By     Initials Name Provider Type    CHRISTIE Ran Arredondo MS, CFY-SLP Speech and Language Pathologist          Therapy Charges for Today     Code Description Service Date Service Provider Modifiers Qty    13804010722 HC ST EVAL SPEECH AND PROD W LANG  1 2019 Ran Arredondo MS, CFY-SLP GN 1    13911348599 HC ST TREATMENT SPEECH 2 2019 ArnulfoRan campos MS, CFY-SLP GN 1                     Ran MS Arnulfo, CFY-SLP  2019 and Acute Care - Speech Language Pathology Treatment Note   [facility]     Patient Name: Omayra Bhardwaj  : 1935  MRN: [id number]  Today's Date: 2019         Admit Date: 2019    Visit Dx:      ICD-10-CM ICD-9-CM   1. Aspiration pneumonia, unspecified aspiration pneumonia type, unspecified laterality, unspecified part of lung (CMS/[facility]) J69.0 507.0   2. ALS (amyotrophic lateral sclerosis) (CMS/[facility]) G12.21 335.20   3. Dysphagia, unspecified type R13.10 787.20   4. Oropharyngeal dysphagia R13.12 787.22     Patient Active Problem List   Diagnosis   • Hypertensive cardiovascular disease   • Mild episode of recurrent major depressive disorder (CMS/[facility])   • Chronic acquired lymphedema   • Thyroid nodule   • Hyperparathyroidism, primary (CMS/[facility])   • Disordered sleep   • Gastroesophageal reflux disease without esophagitis   • Difficulty with speech   • Allergic rhinitis   • Benign essential hypertension   • Dysphagia as late effect of cerebrovascular accident (CVA)   • Hypercholesterolemia   • Adhesive capsulitis of left shoulder   • Chronic diastolic CHF (congestive heart failure), NYHA class 2 (CMS/[facility])   • Bilateral edema of lower extremity   • Mild neurocognitive disorder   • Senile osteoporosis   • Progressive bulbar palsy (CMS/[facility])   • Risk for falls   • Multifactorial gait disorder   • Chronic kidney disease, stage 3 (moderate) (CMS/[facility])   • Chronic left shoulder pain   • Urge incontinence of urine   • Hypercalcemia   • Varicose veins of lower extremities without ulcer  or inflammation   • Subarachnoid hemorrhage after traumatic injury without open intracranial wound, with prolonged loss of consciousness and return to pre-existing level of consciousness (CMS/HCC)   • Scaly patch rash   • Chronic pain of both knees   • Chronic bilateral low back pain without sciatica   • General weakness   • Urinary frequency   • Dysarthria   • Hypertensive heart and chronic kidney disease stage 3 (CMS/HCC)   • Obesity   • Shoulder pain   • Varicose veins of both lower extremities   • B12 deficiency   • Aspiration pneumonia (CMS/HCC)        Therapy Treatment  Rehabilitation Treatment Summary     Row Name 04/09/19 1515             Treatment Time/Intention    Discipline  speech language pathologist  -MP      Document Type  therapy note (daily note)  -MP      Subjective Information  no complaints  -MP      Mode of Treatment  individual therapy;speech-language pathology  -MP      Patient/Family Observations  Dtrs present  -MP      Therapy Frequency (Swallow)  PRN  -MP      Therapy Frequency (SLP SLC)  PRN  -MP      Recorded by [MP] Ran Arredondo MS, RAISSA-SLP 04/09/19 1611      Row Name 04/09/19 1511             Outcome Summary/Treatment Plan (SLP)    Daily Summary of Progress (SLP)  progress toward functional goals as expected  -MP      Plan for Continued Treatment (SLP)  Provided extensive education to pt/family in regards to AAC and options for alternative communication. Provided handouts/information on assistive devices/applications. SLP will follow pt/family for continued education on an as needed basis.  -MP      Anticipated Dischage Disposition  other (see comments) assisted living w/ hopsice  -MP      Recorded by [MP] Ran Arredondo MS, RAISSA-SLP 04/09/19 1078        User Key  (r) = Recorded By, (t) = Taken By, (c) = Cosigned By    Initials Name Effective Dates Discipline    MP Ran Arredondo MS, RAISSA-SLP 08/15/18 -  SLP          EDUCATION  The patient has been educated in the following  areas:   Communication Impairment.    SLP Recommendation and Plan  Daily Summary of Progress (SLP): progress toward functional goals as expected     Plan for Continued Treatment (SLP): Provided extensive education to pt/family in regards to AAC and options for alternative communication. Provided handouts/information on assistive devices/applications. SLP will follow pt/family for continued education on an as needed basis.  Anticipated Dischage Disposition: other (see comments)(assisted living w/ hospice)             SLP GOALS     Row Name 04/09/19 1515 04/08/19 1000          Oral Nutrition/Hydration Goal 1 (SLP)    Oral Nutrition/Hydration Goal 1, SLP  --  LTG: Pt will tolerate desired comfort diet w/o reported concerns or signs of distress w/o cues.  -AC     Time Frame (Oral Nutrition/Hydration Goal 1, SLP)  --  by discharge  -AC        Oral Nutrition/Hydration Goal 2 (SLP)    Oral Nutrition/Hydration Goal 2, SLP  --  Pt will tolerate desired comfort diet w/o concerns or s/sxs distress per SLP trials and/or pt/family/RN report 80% of the time.  -AC     Time Frame (Oral Nutrition/Hydration Goal 2, SLP)  --  short term goal (STG);by discharge  -AC        Augmentative/Alternative Communication Objectives Goal 1 (SLP    Communication (Augmentative/Alternative Communication Goal 1, SLP)  improve ability to use non-verbal communication strategies;improve ability to use low tech augmentative/alternative communication device  -MP  --     Improve Communication by (Augmentative/Alternative Communication Goal 1, SLP)  alphabet/picture board;low tech communication device;use communication device to express short 1-3 word phrases;use communication device to express sentences;80%;with minimal cues (75-90%);other (see comments) if pt/family wishing to pursue high-tech AAC device  -MP  --     Time Frame (Augmentative/Alternative Communication Goal 1, SLP)  short term goal (STG);by discharge  -MP  --        Additional Goal 1 (SLP)     Additional Goal 1, SLP  LTG: Pt will utilize augmentative/alternative communication to communicate wants/needs w/ 100% acc and no cues  -MP  --     Time Frame (Additional Goal 1, SLP)  by discharge  -MP  --       User Key  (r) = Recorded By, (t) = Taken By, (c) = Cosigned By    Initials Name Provider Type    Zulay Matos, MS CCC-SLP Speech and Language Pathologist    Ran Gilbert MS, CFY-SLP Speech and Language Pathologist              Time Calculation:     Time Calculation- SLP     Row Name 04/09/19 1614             Time Calculation- SLP    SLP Start Time  1515  -MP      SLP Received On  04/09/19  -MP        User Key  (r) = Recorded By, (t) = Taken By, (c) = Cosigned By    Initials Name Provider Type    Ran Gilbert MS, CFY-SLP Speech and Language Pathologist          Therapy Charges for Today     Code Description Service Date Service Provider Modifiers Qty    37975943663  ST EVAL SPEECH AND PROD W LANG  1 4/9/2019 Ran Arredondo MS, CFEDIN-SLP GN 1    [id number]  ST TREATMENT SPEECH 2 4/9/2019 Ran Arredondo MS, RAISSA-SLP GN 1                     Ran Arredondo MS, CFY-KELLEY  4/9/2019

## 2019-04-09 NOTE — PROGRESS NOTES
Continued Stay Note  [facility]     Patient Name: Omayra Bhardwaj  MRN: [id number]  Today's Date: 4/9/2019    Admit Date: 4/6/2019    Discharge Plan     Row Name 04/09/19 1036       Plan    Plan Comments  CM continues to follow along with Palliative services. Pt is deciding on plan of care and if she wants at PEG tube. Per MD notes pt has also requested to speak with Hospice. CM will continue to follow and assist with discharge needs.         Discharge Codes    No documentation.       Expected Discharge Date and Time     Expected Discharge Date Expected Discharge Time    Apr 10, 2019             Suzanna Renee

## 2019-04-09 NOTE — PROGRESS NOTES
"GI Daily Progress Note  Subjective:    Chief Complaint:  Oropharyngeal dysphagia    The patient ate approximately 10% of her meals today.  She feels stronger.  She is in good spirits.    Objective:    /63 (BP Location: Left arm, Patient Position: Sitting)   Pulse 62   Temp 97.9 °F (36.6 °C) (Oral)   Resp 16   Ht 162.6 cm (64.02\")   Wt 64.8 kg (142 lb 12.8 oz)   LMP  (LMP Unknown)   SpO2 92%   BMI 24.50 kg/m²     Physical Exam   Constitutional: She is oriented to person, place, and time. She appears well-developed and well-nourished. No distress.   HENT:   Head: Normocephalic.   Eyes: Conjunctivae are normal. No scleral icterus.   Neck: Normal range of motion.   Cardiovascular: Normal rate.   Pulmonary/Chest: Effort normal. No stridor. No respiratory distress. She has no wheezes. She has no rales.   Diminished breath sounds at the bases.   Abdominal: Soft. Bowel sounds are normal. She exhibits no distension. There is no tenderness. There is no guarding.   Genitourinary:   Genitourinary Comments: Deferred   Musculoskeletal: She exhibits no edema.   Neurological: She is alert and oriented to person, place, and time.   Skin: Skin is warm and dry. Capillary refill takes less than 2 seconds. No rash noted.   Psychiatric: She has a normal mood and affect. Her behavior is normal.   Nursing note and vitals reviewed.      Lab  Lab Results   Component Value Date    WBC 6.65 04/09/2019    HGB 13.0 04/09/2019    HGB 13.6 04/07/2019    HGB 14.1 04/06/2019    MCV 92.9 04/09/2019     04/09/2019    INR 1.08 03/27/2019    INR 0.93 07/15/2017       Lab Results   Component Value Date    GLUCOSE 74 04/09/2019    BUN 17 04/09/2019    CREATININE 0.71 04/09/2019    EGFRIFNONA 79 04/09/2019    EGFRIFAFRI 94 02/25/2019    BCR 23.9 04/09/2019    CO2 20.0 (L) 04/09/2019    CALCIUM 10.0 04/09/2019    PROTENTOTREF 6.3 02/25/2019    ALBUMIN 4.37 04/06/2019    ALKPHOS 83 04/06/2019    BILITOT 0.8 04/06/2019    ALT 15 " 04/06/2019    AST 25 04/06/2019       Assessment:    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  Aspiration pneumonitis, improving.    Plan:    >> After careful consideration and discussion with her daughters, the patient decided she does not want artificial feeding.  >> Recommend the patient have availability of oral suctioning when she is discharged.  >> Recommend hospice consult.    Will sign off. Please call with questions or concerns.    Mark I. Brunner, MD  04/09/19  1:25 PM

## 2019-04-09 NOTE — PROGRESS NOTES
Adult Nutrition  Assessment/PES    Patient Name:  Omayra Bhardwaj  YOB: 1935  MRN: [id number]  Admit Date:  4/6/2019    Assessment Date:  4/9/2019    Comments:      Reason for Assessment     Row Name 04/09/19 1059          Reason for Assessment    Reason For Assessment  other (see comments);follow-up protocol SLP CONSULTED RD     Diagnosis  -- SEE PREVIOUS NUTRITION NOTES         Nutrition/Diet History     Row Name 04/09/19 1059          Nutrition/Diet History    Typical Food/Fluid Intake  DAUGHTER REPORTS PT CAN EAT MOST ANY PUREED FOOD & IS VERY RECEPTIVE TO BOOST PLUS SUPPLEMENTATION AS WELL AS NECTAR THICK MILK TID. DAUGHTER REPORTS PT FATIGUES EASILY WHEN EATING WHICH MAKES IT DIFFICULT TO EAT MUCH @ ONE TIME.            Labs/Tests/Procedures/Meds     Row Name 04/09/19 1102          Labs/Procedures/Meds    Lab Results Reviewed  reviewed        Diagnostic Tests/Procedures    Diagnostic Test/Procedure Reviewed  reviewed     Diagnostic Test/Procedures Comments  -- NOTED SLP RECS NPO OR COMFORT DIET OF LEVEL 2 DYSPHAGIA NTL        Medications    Pertinent Medications Reviewed  reviewed         Physical Findings     Row Name 04/09/19 1106          Physical Findings    Gastrointestinal  other (see comments) WDL PER NURSING DOCUMENTATION     Skin  other (see comments) WDL PER NURSING DOCUMENTATION         Estimated/Assessed Needs     Row Name 04/09/19 1108          Estimated/Assessed Needs    Additional Documentation  Calorie Requirements (Group);Protein Requirements (Group);Fluid Requirements (Group)        Calorie Requirements    Weight Used For Calorie Calculations  64.9 kg (143 lb 1.3 oz) ACT BW     Estimated Calorie Requirement (kcal/day)  1424     Estimated Calorie Need Method  Boaz Jones        Protein Requirements    Weight Used For Protein Calculations  64.9 kg (143 lb 1.3 oz)     Est Protein Requirement Amount (gms/kg)  0.8 gm protein TO 1.0. EST PROTEIN NEEDS: 52-65 G/DAY         Fluid Requirements    Estimated Fluid Requirements (mL/day)  1623     Estimated Fluid Requirement Method  RDA Method 25 ML/KG     RDA Method (mL)  1623         Nutrition Prescription Ordered     Row Name 04/09/19 1111          Nutrition Prescription PO    Current PO Diet  Dysphagia     Dysphagia Level  2  Pureed     Fluid Consistency  Nectar/syrup thick     Other Modifiers  Other (comment) NO STRAWS/ COMFORT DIET         Evaluation of Received Nutrient/Fluid Intake     Row Name 04/09/19 1111          PO Evaluation    Number of Days PO Intake Evaluated  Insufficient Data     Number of Meals  1     % PO Intake  50               Problem/Interventions:  Problem 1     Row Name 04/09/19 1111          Nutrition Diagnoses Problem 1    Problem 1  Malnutrition     Etiology (related to)  Medical Diagnosis     Neurological  ALS progession of symptoms     Signs/Symptoms (evidenced by)  Unintended Weight Change;PO Intake PO intake <75% for >1 month     Unintended Weight Change  Loss     Number of Pounds Lost  32 lbs     Weight loss time period  3.5 months                 Intervention Goal     Row Name 04/09/19 1112          Intervention Goal    General  Nutrition support treatment;Other (comment) PENDING GOC. IF GOC AGGRESSIVE, PT NEEDS TO EAT ~ 25% OF MEALS & ALL OF BOOST PLUS OR 50% OF MEALS & 1.5 BOOST PLUS SERVINGS. BOOST PLUS TO BE RESERVED FOR BETWEEN MEALS.         Nutrition Intervention     Row Name 04/09/19 1114          Nutrition Intervention    RD/Tech Action  Care plan reviewd;Follow Tx progress;Interview for preference;Encourage intake;Recommend/ordered;Other (comment) REVIEWED PO INTAKE GOALS WITH PT & DAUGHTER IF GOC AGGRESSIVE     Recommended/Ordered  Supplement         Nutrition Prescription     Row Name 04/09/19 1115          Nutrition Prescription PO    PO Prescription  Begin/change supplement     Supplement  Boost Plus     Supplement Frequency  3 times a day     New PO Prescription Ordered?  Yes          Education/Evaluation     Row Name 04/09/19 1115          Monitor/Evaluation    Monitor  Per protocol           Electronically signed by:  Sherrie Harden RD  04/09/19 11:16 AM

## 2019-04-09 NOTE — PROGRESS NOTES
Continued Stay Note  [facility]     Patient Name: Omayra Bhardwaj  MRN: [id number]  Today's Date: 4/9/2019    Admit Date: 4/6/2019    Discharge Plan     Row Name 04/09/19 1608       Plan    Plan  Undetermined    Plan Comments  Referral received.  Chart reviewed.  Visit made.  Pt is lying in bed at time of visit.  No s/s disress noted.  However, patient expressed her neck was tired.  Positioned patient's chin on rolled sheet and placed across the length of her shoulders under her chin.  This allowed the patient to hold head up approximately 2 inches higher and patient expressed appreciation.  Hospice overview completed with patient and daughters who verbalized understanding and interest in hospice home care services.  However, daughters are uncertain about patient's discharge disposition as they are unsure of what [facility] will allow.  They will contact administration at that facility and review options of patient remaining in her apartment as this is her wish.  Will continue to follow for possible hospice admission upon discharge.  If may be of further assistance please call, [phone].         Discharge Codes    No documentation.       Expected Discharge Date and Time     Expected Discharge Date Expected Discharge Time    Apr 10, 2019             Carmen Haskins RN

## 2019-04-09 NOTE — PLAN OF CARE
Problem: Patient Care Overview  Goal: Interprofessional Rounds/Family Conf  Outcome: Ongoing (interventions implemented as appropriate)  1300 Palliative Team Meeting Attendance:  Dr. Jeet Back, Marilyn Will, APRN, Milli Kan, RN, [facility], Swathi Aponte, JEVON/CM, Hospice, Vickie Jones RN, [facility] and Denise Cheng, [facility].   04/09/19 1300   Interdisciplinary Rounds/Family Conf   Summary Patient made her mind up that she does not want to do surgery, no PEG, no feeding tube and no trach. She wants to eat and drink on her own.  I gave her a neck pillow to try to hold her head up. Family at bedside and supportive. Dr. Back notified of patient's decision.   Participants advanced practice nurse;nursing;pastoral care;physician;social work/services

## 2019-04-09 NOTE — PLAN OF CARE
Problem: Patient Care Overview  Goal: Plan of Care Review  Outcome: Ongoing (interventions implemented as appropriate)   04/09/19 4582   Coping/Psychosocial   Plan of Care Reviewed With patient;family   SLP evaluation, treatment and caregiver instruction completed. Will continue to follow for education as needed/desired. Please see note for further details and recommendations.

## 2019-04-10 LAB
ANION GAP SERPL CALCULATED.3IONS-SCNC: 15 MMOL/L
BUN BLD-MCNC: 15 MG/DL (ref 8–23)
BUN/CREAT SERPL: 25.9 (ref 7–25)
CALCIUM SPEC-SCNC: 9.8 MG/DL (ref 8.6–10.5)
CHLORIDE SERPL-SCNC: 103 MMOL/L (ref 98–107)
CO2 SERPL-SCNC: 23 MMOL/L (ref 22–29)
CREAT BLD-MCNC: 0.58 MG/DL (ref 0.57–1)
DEPRECATED RDW RBC AUTO: 46.9 FL (ref 37–54)
ERYTHROCYTE [DISTWIDTH] IN BLOOD BY AUTOMATED COUNT: 13.7 % (ref 12.3–15.4)
GFR SERPL CREATININE-BSD FRML MDRD: 99 ML/MIN/1.73
GLUCOSE BLD-MCNC: 75 MG/DL (ref 65–99)
HCT VFR BLD AUTO: 41.1 % (ref 34–46.6)
HGB BLD-MCNC: 13 G/DL (ref 12–15.9)
MCH RBC QN AUTO: 29.5 PG (ref 26.6–33)
MCHC RBC AUTO-ENTMCNC: 31.6 G/DL (ref 31.5–35.7)
MCV RBC AUTO: 93.4 FL (ref 79–97)
PLATELET # BLD AUTO: 162 10*3/MM3 (ref 140–450)
PMV BLD AUTO: 10.2 FL (ref 6–12)
POTASSIUM BLD-SCNC: 3.6 MMOL/L (ref 3.5–5.2)
RBC # BLD AUTO: 4.4 10*6/MM3 (ref 3.77–5.28)
SODIUM BLD-SCNC: 141 MMOL/L (ref 136–145)
WBC NRBC COR # BLD: 3.98 10*3/MM3 (ref 3.4–10.8)

## 2019-04-10 PROCEDURE — 99233 SBSQ HOSP IP/OBS HIGH 50: CPT | Performed by: HOSPITALIST

## 2019-04-10 PROCEDURE — 85027 COMPLETE CBC AUTOMATED: CPT | Performed by: HOSPITALIST

## 2019-04-10 PROCEDURE — 25010000002 PIPERACILLIN SOD-TAZOBACTAM PER 1 G: Performed by: INTERNAL MEDICINE

## 2019-04-10 PROCEDURE — 25010000002 HEPARIN (PORCINE) PER 1000 UNITS: Performed by: INTERNAL MEDICINE

## 2019-04-10 PROCEDURE — 80048 BASIC METABOLIC PNL TOTAL CA: CPT | Performed by: HOSPITALIST

## 2019-04-10 RX ORDER — GUAIFENESIN 200 MG/1
400 TABLET ORAL EVERY 8 HOURS SCHEDULED
Status: DISCONTINUED | OUTPATIENT
Start: 2019-04-10 | End: 2019-04-10

## 2019-04-10 RX ORDER — AMOXICILLIN AND CLAVULANATE POTASSIUM 400; 57 MG/5ML; MG/5ML
875 POWDER, FOR SUSPENSION ORAL EVERY 12 HOURS SCHEDULED
Status: DISCONTINUED | OUTPATIENT
Start: 2019-04-10 | End: 2019-04-11 | Stop reason: HOSPADM

## 2019-04-10 RX ORDER — AMOXICILLIN AND CLAVULANATE POTASSIUM 875; 125 MG/1; MG/1
1 TABLET, FILM COATED ORAL EVERY 12 HOURS SCHEDULED
Status: DISCONTINUED | OUTPATIENT
Start: 2019-04-10 | End: 2019-04-10

## 2019-04-10 RX ORDER — LACTULOSE 10 G/15ML
20 SOLUTION ORAL DAILY
Status: DISCONTINUED | OUTPATIENT
Start: 2019-04-10 | End: 2019-04-11 | Stop reason: HOSPADM

## 2019-04-10 RX ORDER — GUAIFENESIN 600 MG/1
600 TABLET, EXTENDED RELEASE ORAL EVERY 12 HOURS SCHEDULED
Status: DISCONTINUED | OUTPATIENT
Start: 2019-04-10 | End: 2019-04-10

## 2019-04-10 RX ORDER — GLYCOPYRROLATE 1 MG/1
1 TABLET ORAL 2 TIMES DAILY
Status: DISCONTINUED | OUTPATIENT
Start: 2019-04-10 | End: 2019-04-11 | Stop reason: HOSPADM

## 2019-04-10 RX ADMIN — GUAIFENESIN 400 MG: 200 TABLET ORAL at 13:35

## 2019-04-10 RX ADMIN — TAZOBACTAM SODIUM AND PIPERACILLIN SODIUM 4.5 G: 500; 4 INJECTION, SOLUTION INTRAVENOUS at 01:00

## 2019-04-10 RX ADMIN — AMOXICILLIN AND CLAVULANATE POTASSIUM 872 MG: 400; 57 POWDER, FOR SUSPENSION ORAL at 21:52

## 2019-04-10 RX ADMIN — HEPARIN SODIUM 5000 UNITS: 5000 INJECTION INTRAVENOUS; SUBCUTANEOUS at 09:26

## 2019-04-10 RX ADMIN — LISINOPRIL 5 MG: 5 TABLET ORAL at 09:26

## 2019-04-10 RX ADMIN — ACEBUTOLOL HYDROCHLORIDE 200 MG: 200 CAPSULE ORAL at 09:26

## 2019-04-10 RX ADMIN — GUAIFENESIN 400 MG: 200 SOLUTION ORAL at 21:52

## 2019-04-10 RX ADMIN — HEPARIN SODIUM 5000 UNITS: 5000 INJECTION INTRAVENOUS; SUBCUTANEOUS at 21:43

## 2019-04-10 RX ADMIN — ATORVASTATIN CALCIUM 10 MG: 10 TABLET, FILM COATED ORAL at 21:44

## 2019-04-10 RX ADMIN — TORSEMIDE 5 MG: 10 TABLET ORAL at 09:26

## 2019-04-10 RX ADMIN — GLYCOPYRROLATE 1 MG: 1 TABLET ORAL at 21:52

## 2019-04-10 RX ADMIN — GLYCOPYRROLATE 1 MG: 1 TABLET ORAL at 13:35

## 2019-04-10 RX ADMIN — TAZOBACTAM SODIUM AND PIPERACILLIN SODIUM 4.5 G: 500; 4 INJECTION, SOLUTION INTRAVENOUS at 09:30

## 2019-04-10 RX ADMIN — SODIUM CHLORIDE, PRESERVATIVE FREE 3 ML: 5 INJECTION INTRAVENOUS at 21:53

## 2019-04-10 RX ADMIN — SODIUM CHLORIDE, PRESERVATIVE FREE 3 ML: 5 INJECTION INTRAVENOUS at 09:26

## 2019-04-10 NOTE — PLAN OF CARE
Problem: Skin Injury Risk (Adult)  Goal: Identify Related Risk Factors and Signs and Symptoms  Outcome: Ongoing (interventions implemented as appropriate)      Problem: Fall Risk (Adult)  Goal: Identify Related Risk Factors and Signs and Symptoms  Outcome: Ongoing (interventions implemented as appropriate)      Problem: Patient Care Overview  Goal: Plan of Care Review  Outcome: Ongoing (interventions implemented as appropriate)   04/09/19 1550 04/10/19 0331   Coping/Psychosocial   Plan of Care Reviewed With --  patient   Plan of Care Review   Progress no change --    OTHER   Outcome Summary --  Vss, patient denies pain. Patient and family decided against a feeding tube and are opting for comfort feeding. Patient wants to return to assistive living with hospice care. Will need to check in am to see if this is an option.       Problem: Palliative Care (Adult)  Goal: Identify Related Risk Factors and Signs and Symptoms  Outcome: Ongoing (interventions implemented as appropriate)

## 2019-04-10 NOTE — PROGRESS NOTES
Continued Stay Note   [facility]     Patient Name: Omayra Bhardwaj  MRN: [id number]  Today's Date: 4/10/2019    Admit Date: 4/6/2019    Discharge Plan     Row Name 04/10/19 1612       Plan    Plan  [facility] 04/11/2019 per private car    Plan Comments  Chart reviewed.  PPS 50%.  Patient is sitting up in chair at time of visit.  She expressed that her daughters had spoken with Dominic at [facility] and encouraged me to call Dominic as well.  Later discussed with pt and daughter that after discussion with Dominic @ [facility] it has been determined that the patient can return to her apartment and may live there independently as long as she remains able to dress and transfer herself.  Discussion with patient regarding plan for when she declines and she feels she will be able to hire a private caregiver for 24/7 care.  Per previous conversation with Dominic, [facility] is agreeable to her remaining in her apartment through EOL provided she has 24/7 caregivers.  Patient expresses she wishes to return to [facility].  DME set up for delivery 04/11/2019,  DC plan is home per private car 04/11/2019.  Will continue to follow for possible hospice admission upon discharge.  If may be of further assistance, please call [phone].         Discharge Codes    No documentation.       Expected Discharge Date and Time     Expected Discharge Date Expected Discharge Time    Apr 12, 2019             Carmen Haskins RN

## 2019-04-10 NOTE — PLAN OF CARE
Problem: Patient Care Overview  Goal: Interprofessional Rounds/Family Conf  Outcome: Ongoing (interventions implemented as appropriate)  1300 Palliative Team Meeting Attendance:  Dr. Jeet Back, KT Childers, Milli Kan, JEVON, DAY TavaresW, Jailene Tong, RN, CHPN, Carmen Haskins RN/CM, Hospice, [facility] and Sweta Jose, [facility].   04/10/19 1300   Interdisciplinary Rounds/Family Conf   Summary Patient awake, ate some breakfast, gave chin rest that a nurse made for her with extra covers. Patient greatful. Patient denies pain, drooling, hospice consulted for discharge plan. Carmen Haskins RN/CM reports that   [facility]ok[facility] Sanchez did state she could come back with hospice and care givers. Palliative will continue to follow for support and symptom management.   Participants advanced practice nurse;nursing;pastoral care;physician;social work/services

## 2019-04-10 NOTE — PROGRESS NOTES
[facility] Medicine Services  PROGRESS NOTE    Patient Name: Omayra Bhardwaj  : 1935  MRN: [id number]    Date of Admission: 2019  Length of Stay: 4  Primary Care Physician: Suzanna White MD    Subjective   Subjective     CC:  Aspiration PNA / ALS    HPI:      Still coughing. Denies shortness of breath. Denies pain. Frustrated by congestion more than anything.    Review of Systems    Unable to assess    Otherwise ROS is negative except as mentioned in the HPI.    Objective   Objective     Vital Signs:   Temp:  [97.7 °F (36.5 °C)-97.9 °F (36.6 °C)] 97.7 °F (36.5 °C)  Heart Rate:  [60-65] 60  Resp:  [16-18] 16  BP: (138-149)/(58-81) 138/72        Physical Exam:    NAD, unintelligible speech, no family at bedside, writing in notebook  OP clear, MMM  Neck supple  No LAD  RRR  Diminished but no coarse breath sounds  +BS, ND, NT  BANDA, but weak  No rashes  Normal affect  No change     Results Reviewed:  I have personally reviewed current lab, radiology, and data and agree.    Results from last 7 days   Lab Units 04/10/19  0513 19  0553 19  1013   WBC 10*3/mm3 3.98 6.65 7.81   HEMOGLOBIN g/dL 13.0 13.0 13.6   HEMATOCRIT % 41.1 40.4 42.5   PLATELETS 10*3/mm3 162 165 126*     Results from last 7 days   Lab Units 04/10/19  0513 19  0553 19  1154 19  1146   SODIUM mmol/L 141 140  --  144   POTASSIUM mmol/L 3.6 3.6  --  3.6   CHLORIDE mmol/L 103 100  --  106   CO2 mmol/L 23.0 20.0*  --  25.0   BUN mg/dL 15 17  --  18   CREATININE mg/dL 0.58 0.71  --  0.74   GLUCOSE mg/dL 75 74  --  79   CALCIUM mg/dL 9.8 10.0  --  10.8*   ALT (SGPT) U/L  --   --   --  15   AST (SGOT) U/L  --   --   --  25   TROPONIN I ng/mL  --   --  0.00  --      Estimated Creatinine Clearance: 54.5 mL/min (by C-G formula based on SCr of 0.58 mg/dL).    No results found for: BNP    Microbiology Results Abnormal     Procedure Component Value - Date/Time    Blood Culture - Blood,  Arm, Right [[id number]] Collected:  04/06/19 1135    Lab Status:  Preliminary result Specimen:  Blood from Arm, Right Updated:  04/09/19 1215     Blood Culture No growth at 3 days    Blood Culture - Blood, Arm, Left [[id number]] Collected:  04/06/19 1146    Lab Status:  Preliminary result Specimen:  Blood from Arm, Left Updated:  04/09/19 1215     Blood Culture No growth at 3 days          Imaging Results (last 24 hours)     Procedure Component Value Units Date/Time    XR Chest 1 View [[id number]] Collected:  04/09/19 0923     Updated:  04/09/19 1126    Narrative:       EXAMINATION: XR CHEST 1 VW-      INDICATION: Dyspnea, on exertion.      COMPARISON: 04/06/2019.     FINDINGS: Cardiac size borderline enlarged and unchanged. Chronic  changes of the lungs including hyperinflated appearance without focal  consolidation or opacification. No pneumothorax or significant effusion.            Impression:       No significant interval change. No new parenchymal disease.     D:  04/09/2019  E:  04/09/2019     This report was finalized on 4/9/2019 11:23 AM by Dr. Terence Vega.             Results for orders placed during the hospital encounter of 07/15/17   Adult Transthoracic Echo Complete    Narrative · Left ventricular systolic function is normal. Estimated EF = 70%.  · Left ventricular diastolic dysfunction (grade I) consistent with   impaired relaxation.  · Right ventricular cavity is mildly dilated.  · Normal right ventricular wall thickness, systolic function and septal   motion noted with right ventricular cavity mildly dilated.  · No evidence of pulmonary hypertension is present.  · There is no evidence of pericardial effusion.  · No significant structural valvular abnormality demonstrated.          I have reviewed the medications:    Current Facility-Administered Medications:   •  acebutolol (SECTRAL) capsule 200 mg, 200 mg, Oral, Q24H, Carlo Arias MD, 200 mg at 04/10/19 0926  •  amoxicillin-clavulanate  (AUGMENTIN) 875-125 MG per tablet 1 tablet, 1 tablet, Oral, Q12H, Dawood Leon MD  •  atorvastatin (LIPITOR) tablet 10 mg, 10 mg, Oral, Nightly, Carlo Arias MD, 10 mg at 04/09/19 2023  •  glycopyrrolate (ROBINUL) tablet 1 mg, 1 mg, Oral, BID, Dawood Leon MD  •  guaiFENesin (MUCINEX) 12 hr tablet 600 mg, 600 mg, Oral, Q12H, Dawood Leon MD  •  heparin (porcine) 5000 UNIT/ML injection 5,000 Units, 5,000 Units, Subcutaneous, Q12H, Carlo Arias MD, 5,000 Units at 04/10/19 0926  •  lisinopril (PRINIVIL,ZESTRIL) tablet 5 mg, 5 mg, Oral, Daily, Carlo Arias MD, 5 mg at 04/10/19 0926  •  Morphine sulfate (PF) injection 1 mg, 1 mg, Intravenous, Q4H PRN **AND** naloxone (NARCAN) injection 0.4 mg, 0.4 mg, Intravenous, Q5 Min PRN, Carlo Arias MD  •  sodium chloride 0.9 % flush 10 mL, 10 mL, Intravenous, PRN, Parveen Gill MD  •  [COMPLETED] Insert peripheral IV, , , Once **AND** sodium chloride 0.9 % flush 10 mL, 10 mL, Intravenous, PRN, Yazan Pugh APRN  •  sodium chloride 0.9 % flush 3 mL, 3 mL, Intravenous, Q12H, Carlo Arias MD, 3 mL at 04/10/19 0926  •  sodium chloride 0.9 % flush 3-10 mL, 3-10 mL, Intravenous, PRN, Carlo Arias MD  •  torsemide (DEMADEX) tablet 5 mg, 5 mg, Oral, Daily, Carlo Arias MD, 5 mg at 04/10/19 0926      Assessment/Plan   Assessment / Plan     Active Hospital Problems    Diagnosis POA   • Aspiration pneumonia (CMS/[facility]) [J69.0] Yes          Brief Hospital Course to date:  Omayra Bhardwaj is a 83 y.o. 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     *Dysphagia     * HTN     * HLP        DVT Prophylaxis:  Heparin SC    Disposition: I expect the patient to be discharged TBD    CODE STATUS:   Code Status and Medical Interventions:   Ordered  at: 04/07/19 1334     Limited Support to NOT Include:    Intubation    Cardioversion/Defibrillation    Antiarrhythmic Drugs    Vasopressors     Code Status:    No CPR     Medical Interventions (Level of Support Prior to Arrest):    Limited         Electronically signed by Dawood Leon MD, 04/10/19, 10:47 AM.

## 2019-04-11 VITALS
SYSTOLIC BLOOD PRESSURE: 129 MMHG | BODY MASS INDEX: 24.38 KG/M2 | OXYGEN SATURATION: 91 % | DIASTOLIC BLOOD PRESSURE: 59 MMHG | TEMPERATURE: 98.1 F | WEIGHT: 142.8 LBS | RESPIRATION RATE: 18 BRPM | HEART RATE: 68 BPM | HEIGHT: 64 IN

## 2019-04-11 PROBLEM — R13.12 OROPHARYNGEAL DYSPHAGIA: Status: ACTIVE | Noted: 2019-04-11

## 2019-04-11 LAB
BACTERIA SPEC AEROBE CULT: NORMAL
BACTERIA SPEC AEROBE CULT: NORMAL

## 2019-04-11 PROCEDURE — 99239 HOSP IP/OBS DSCHRG MGMT >30: CPT | Performed by: PHYSICIAN ASSISTANT

## 2019-04-11 PROCEDURE — 25010000002 HEPARIN (PORCINE) PER 1000 UNITS: Performed by: INTERNAL MEDICINE

## 2019-04-11 RX ORDER — GLYCOPYRROLATE 1 MG/1
1 TABLET ORAL 2 TIMES DAILY
Qty: 8 TABLET | Refills: 0 | Status: SHIPPED | OUTPATIENT
Start: 2019-04-11 | End: 2019-04-11

## 2019-04-11 RX ORDER — AMOXICILLIN AND CLAVULANATE POTASSIUM 400; 57 MG/5ML; MG/5ML
875 POWDER, FOR SUSPENSION ORAL EVERY 12 HOURS SCHEDULED
Qty: 109 ML | Refills: 0 | Status: SHIPPED | OUTPATIENT
Start: 2019-04-11 | End: 2019-04-16

## 2019-04-11 RX ORDER — GLYCOPYRROLATE 1 MG/1
1 TABLET ORAL 2 TIMES DAILY
Qty: 60 TABLET | Refills: 0 | Status: SHIPPED | OUTPATIENT
Start: 2019-04-11

## 2019-04-11 RX ADMIN — AMOXICILLIN AND CLAVULANATE POTASSIUM 872 MG: 400; 57 POWDER, FOR SUSPENSION ORAL at 09:35

## 2019-04-11 RX ADMIN — LACTULOSE 20 G: 10 SOLUTION ORAL at 09:32

## 2019-04-11 RX ADMIN — GUAIFENESIN 400 MG: 200 SOLUTION ORAL at 05:32

## 2019-04-11 RX ADMIN — LISINOPRIL 5 MG: 5 TABLET ORAL at 09:32

## 2019-04-11 RX ADMIN — GLYCOPYRROLATE 1 MG: 1 TABLET ORAL at 09:32

## 2019-04-11 RX ADMIN — TORSEMIDE 5 MG: 10 TABLET ORAL at 09:32

## 2019-04-11 RX ADMIN — HEPARIN SODIUM 5000 UNITS: 5000 INJECTION INTRAVENOUS; SUBCUTANEOUS at 09:32

## 2019-04-11 RX ADMIN — ACEBUTOLOL HYDROCHLORIDE 200 MG: 200 CAPSULE ORAL at 09:33

## 2019-04-11 RX ADMIN — SODIUM CHLORIDE, PRESERVATIVE FREE 3 ML: 5 INJECTION INTRAVENOUS at 09:34

## 2019-04-11 NOTE — DISCHARGE SUMMARY
[facility] Medicine Services  DISCHARGE SUMMARY    Patient Name: Omayra Bhardwaj  : 1935  MRN: [id number]    Date of Admission: 2019  Date of Discharge: 2019  Primary Care Physician: Suzanna White MD    Consults     Date and Time Order Name Status Description    2019 1148 Inpatient Gastroenterology Consult Completed     2019 1848 Inpatient Palliative Care MD Consult Completed         Hospital Course     Presenting Problem:   Aspiration pneumonia, unspecified aspiration pneumonia type, unspecified laterality, unspecified part of lung (CMS/HCC) [J69.0]    Active Hospital Problems    Diagnosis  POA   • Oropharyngeal dysphagia [R13.12]  Unknown   • Aspiration pneumonia (CMS/HCC) [J69.0]  Yes   • Chronic kidney disease, stage 3 (moderate) (CMS/HCC) [N18.3]  Yes   • 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      Hospital Course:  Ms. Omayra Bhardwaj is an 82yo female with PMH significant for HTN, hyperlipidemia and bulbar ALS. She follows with Dr. Jabier Lopez of [facility]. She is a resident of [facility] and can ambulate minimally with a walker. Her speech is severely impaired. She also has a known history of dysphagia and last swallow evaluation was 8-9 months ago.  She continues to consume a regular diet and per family, the patient has had previous episodes of cough secondary to aspiration.  In early 2019, family noticed a vigorous cough and shortness of breath out of proportion to previous episodes.  Prior to that time, she had been in her usual state of health.  She was brought to [facility] ED on 2019 for further evaluation.    CT chest showed evidence of a left lower lobe infiltrate, suspicious for  aspiration pneumonia.  She was started on IV Zosyn and speech therapy and palliative care teams were consulted.  The patient was found to have moderate to severe oral dysfunction and suspected pharyngeal dysfunction.  Speech therapy recommended nothing by mouth with alternate route of nutrition.  However, the patient and her family declined this.  Following discussion, a decision was made to utilize a comfort diet of puréed solids and nectar thick liquids (okay to have thin liquids if desired).  Speech therapy also recommended small half teaspoon size bites, sips of liquids and a slow pace.    Antibiotics were transitioned to oral Augmentin.  Palliative and hospice care followed the patient and ultimately, a decision was to transfer home with hospice care.  Ms. Bhardwaj will discharge on 4/11/19.    Day of Discharge     HPI:   Sitting up in chair. She is doing well and has no complaints today. She is excited that she will be leaving the hospital today.     Review of Systems  Gen- No fevers, chills  CV- No chest pain, palpitations  Resp- (+) cough, no dyspnea  GI- No N/V/D, abd pain    Otherwise ROS is negative except as mentioned in the HPI.    Vital Signs:   Temp:  [97.9 °F (36.6 °C)-98.1 °F (36.7 °C)] 98.1 °F (36.7 °C)  Heart Rate:  [58-68] 68  Resp:  [16-18] 18  BP: (120-129)/(52-66) 129/59     Physical Exam:  Constitutional: No acute distress, awake, alert  HENT: NCAT, mucous membranes moist  Respiratory: Diminished bilaterally without wheezes, rales or rhonchi. Normal respiratory effort on room air.   Cardiovascular: RRR, no murmurs, rubs, or gallops, palpable pedal pulses bilaterally  Gastrointestinal: Positive bowel sounds, soft, nontender, nondistended  Musculoskeletal: No bilateral ankle edema  Psychiatric: Appropriate affect, cooperative  Neurologic: Oriented x 3, strength symmetric in all extremities, predominantly aphasic (writes in notebook to communicate more than yes/no questions), no focal deficits    Skin: No rashes    Pertinent  and/or Most Recent Results     Results from last 7 days   Lab Units 04/10/19  0513 04/09/19  0553 04/07/19  1013 04/06/19  1146   WBC 10*3/mm3 3.98 6.65 7.81 5.87   HEMOGLOBIN g/dL 13.0 13.0 13.6 14.1   HEMATOCRIT % 41.1 40.4 42.5 43.0   PLATELETS 10*3/mm3 162 165 126* 149*   SODIUM mmol/L 141 140  --  144   POTASSIUM mmol/L 3.6 3.6  --  3.6   CHLORIDE mmol/L 103 100  --  106   CO2 mmol/L 23.0 20.0*  --  25.0   BUN mg/dL 15 17  --  18   CREATININE mg/dL 0.58 0.71  --  0.74   GLUCOSE mg/dL 75 74  --  79   CALCIUM mg/dL 9.8 10.0  --  10.8*     Results from last 7 days   Lab Units 04/06/19  1146   BILIRUBIN mg/dL 0.8   ALK PHOS U/L 83   ALT (SGPT) U/L 15   AST (SGOT) U/L 25     Results from last 7 days   Lab Units 04/06/19  1154 04/06/19  1146   BNP pg/mL  --  50.0   TROPONIN I ng/mL 0.00  --      Brief Urine Lab Results  (Last result in the past 365 days)      Color   Clarity   Blood   Leuk Est   Nitrite   Protein   CREAT   Urine HCG        04/06/19 1551 Yellow Cloudy Trace Large (3+) Negative Negative             Microbiology Results Abnormal     Procedure Component Value - Date/Time    Blood Culture - Blood, Arm, Right [[id number]] Collected:  04/06/19 1135    Lab Status:  Preliminary result Specimen:  Blood from Arm, Right Updated:  04/10/19 1215     Blood Culture No growth at 4 days    Blood Culture - Blood, Arm, Left [[id number]] Collected:  04/06/19 1146    Lab Status:  Preliminary result Specimen:  Blood from Arm, Left Updated:  04/10/19 1215     Blood Culture No growth at 4 days        Imaging Results (all)     Procedure Component Value Units Date/Time    XR Chest 1 View [[id number]] Collected:  04/09/19 0923     Updated:  04/09/19 1126    Narrative:       EXAMINATION: XR CHEST 1 VW-      INDICATION: Dyspnea, on exertion.      COMPARISON: 04/06/2019.     FINDINGS: Cardiac size borderline enlarged and unchanged. Chronic  changes of the lungs including hyperinflated appearance  without focal  consolidation or opacification. No pneumothorax or significant effusion.         Impression:       No significant interval change. No new parenchymal disease.     D:  04/09/2019  E:  04/09/2019     This report was finalized on 4/9/2019 11:23 AM by Dr. Terence Vega.       CT Chest Without Contrast [[id number]] Collected:  04/06/19 1342     Updated:  04/08/19 1145    Narrative:       EXAMINATION: CT CHEST WO CONTRAST - 04/06/2019     INDICATION: Cough. Evaluate for pneumonia.     TECHNIQUE: Spiral acquisition 5 mm unenhanced images through the chest.     The radiation dose reduction device was turned on for each scan per the  ALARA (As Low as Reasonably Achievable) protocol.     COMPARISON: 07/15/2017 chest CT scan. Portable chest radiograph of  today's date.     FINDINGS: Lungs appear practically clear. In the left lower lobe, there  is some peribronchial thickening, perhaps a few opacified bronchi, and  minimal airspace disease which may represent a focal bronchitis or the  very earliest changes of pneumonia. No similar findings are seen  elsewhere. There is no pleural effusion. Mediastinal window images show  patient's large hiatal hernia. There is no evidence of significant  adenopathy and no pericardial effusion. Included images of the upper  abdomen show a bland-appearing left upper pole renal cyst. Included  portions of the spleen, gallbladder, liver, pancreatic tail, adrenal  glands and right upper renal pole appear unremarkable.       Impression:       Very mild posterior left lower lobe disease, new from prior  study, possibly early changes of pneumonia or bronchitis, or in a  patient of this age, mild changes of aspiration. No other evidence of  active chest disease elsewhere.     DICTATED:   04/06/2019  EDITED/ls :   04/06/2019      This report was finalized on 4/8/2019 11:42 AM by DR. Dawood Grajeda MD.       XR Chest 1 View [[id number]] Collected:  04/06/19 1417     Updated:  04/06/19 1316     Narrative:          EXAMINATION: XR CHEST 1 VW - 04/06/2019     INDICATION: Shortness of air.     COMPARISON: 03/27/2019     FINDINGS: Heart is normal in size. Vasculature appears normal.  Coarsening of the basilar interstitial markings is unchanged from the  prior study, apparently chronic. Left shoulder joint DJD and hiatal  hernia are again noted.           Impression:       Stable chest exam with mild chronic-appearing interstitial  changes and hiatal hernia. No acute chest disease is seen.     DICTATED:   04/06/2019  EDITED/ls :   04/06/2019      This report was finalized on 4/6/2019 10:52 PM by DR. Dawood Grajeda MD.           Results for orders placed during the hospital encounter of 07/15/17   Adult Transthoracic Echo Complete    Narrative · Left ventricular systolic function is normal. Estimated EF = 70%.  · Left ventricular diastolic dysfunction (grade I) consistent with   impaired relaxation.  · Right ventricular cavity is mildly dilated.  · Normal right ventricular wall thickness, systolic function and septal   motion noted with right ventricular cavity mildly dilated.  · No evidence of pulmonary hypertension is present.  · There is no evidence of pericardial effusion.  · No significant structural valvular abnormality demonstrated.         Order Current Status    Blood Culture - Blood, Arm, Left Preliminary result    Blood Culture - Blood, Arm, Right Preliminary result        Discharge Details        Discharge Medications      New Medications      Instructions Start Date   amoxicillin-clavulanate 400-57 MG/5ML suspension  Commonly known as:  AUGMENTIN   875 mg, Oral, Every 12 Hours Scheduled      glycopyrrolate 1 MG tablet  Commonly known as:  ROBINUL   1 mg, Oral, 2 Times Daily      guaiFENesin 100 MG/5ML solution oral solution  Commonly known as:  ROBITUSSIN   400 mg, Oral, Every 8 Hours         Continue These Medications      Instructions Start Date   acebutolol 200 MG capsule  Commonly known as:   SECTRAL   TAKE (1) CAPSULE BY MOUTH DAILY.      atorvastatin 10 MG tablet  Commonly known as:  LIPITOR   Take 1/2 tablet (dose = 5 mg) nightly      diphenhydrAMINE 2 % cream  Commonly known as:  BENADRYL   Topical      famotidine 20 MG tablet  Commonly known as:  PEPCID   20 mg, Oral, 2 Times Daily      FUTURO SOFT CERVICAL COLLAR misc   1 Device, Does not apply, Daily      lisinopril 5 MG tablet  Commonly known as:  PRINIVIL,ZESTRIL   5 mg, Oral, Daily      torsemide 10 MG tablet  Commonly known as:  DEMADEX   5 mg, Oral, Daily      triamcinolone 0.5 % cream  Commonly known as:  KENALOG   Topical, Every 12 Hours Scheduled      VITAMIN B COMPLEX PO   Oral, Daily      vitamin B-12 100 MCG tablet  Commonly known as:  CYANOCOBALAMIN   Oral      Vitamin D3 2000 units chewable tablet   1 tablet, Oral, Daily           No Known Allergies    Discharge Disposition:  Home or Self Care    Discharge Diet:  Diet Order   Procedures   • Diet Dysphagia; II - Pureed; Nectar / Syrup Thick; No Straws     Discharge Activity:   Activity Instructions     Activity as Tolerated          CODE STATUS:    Code Status and Medical Interventions:   Ordered at: 04/07/19 1334     Limited Support to NOT Include:    Intubation    Cardioversion/Defibrillation    Antiarrhythmic Drugs    Vasopressors     Code Status:    No CPR     Medical Interventions (Level of Support Prior to Arrest):    Limited     Future Appointments   Date Time Provider Department Center   4/24/2019  1:15 PM [facility]  [facility]     Additional Instructions for the Follow-ups that You Need to Schedule     Discharge Follow-up with PCP   As directed       Currently Documented PCP:    Suzanna White MD    PCP Phone Number:    [phone]     Follow Up Details:  Follow up with PCP in 1 week             Time Spent on Discharge:  40 minutes    Electronically signed by Tayla Heredia PA-C, 04/11/19, 11:08 AM.

## 2019-04-11 NOTE — PLAN OF CARE
Problem: Skin Injury Risk (Adult)  Goal: Identify Related Risk Factors and Signs and Symptoms  Outcome: Ongoing (interventions implemented as appropriate)      Problem: Fall Risk (Adult)  Goal: Identify Related Risk Factors and Signs and Symptoms  Outcome: Ongoing (interventions implemented as appropriate)      Problem: Patient Care Overview  Goal: Plan of Care Review  Outcome: Ongoing (interventions implemented as appropriate)   04/09/19 1550 04/11/19 0415 04/11/19 0455   Coping/Psychosocial   Plan of Care Reviewed With --  patient --    Plan of Care Review   Progress no change --  --    OTHER   Outcome Summary --  --  Vss, patient denies pain, patient recieving comfort feedings.Plan is for patient to return to [facility] with Hospice today. Daughters are to transport.       Problem: Palliative Care (Adult)  Goal: Identify Related Risk Factors and Signs and Symptoms  Outcome: Ongoing (interventions implemented as appropriate)

## 2019-04-11 NOTE — DISCHARGE PLACEMENT REQUEST
"Omayra Bhardwaj (83 y.o. Female)       Plan is for patient to dc home this am via private car.      DME Scheduled to arrive between 9-11.    Thank you,     Carmen Haskins RN ([facility])         Date of Birth Social Security Number Address Home Phone MRN    1935  [id number]    [place] [id number] [phone] [id number]    [employer] Marital Status          Mormon        Admission Date Admission Type Admitting Provider Attending Provider Department, Room/Bed    4/6/19 Emergency Dawood Leon MD Russell, Marc P, MD [facility] 4G, S448/1    Discharge Date Discharge Disposition Discharge Destination                       Attending Provider:  Dawood Leon MD    Allergies:  No Known Allergies    Isolation:  None   Infection:  None   Code Status:  No CPR    Ht:  162.6 cm (64.02\")   Wt:  64.8 kg (142 lb 12.8 oz)    Admission Cmt:  None   Principal Problem:  None                Active Insurance as of 4/6/2019     Primary Coverage     Payor Plan Insurance Group Employer/Plan Group    HUMANA MEDICARE REPLACEMENT HUMANA MEDICARE REPL [id number]     Payor Plan Address Payor Plan Phone Number Payor Plan Fax Number Effective Dates    [address] [address] [phone]  1/1/2018 - None Entered    [facility] [id number]       Subscriber Name Subscriber Birth Date Member ID       OMAYRA BHARDWAJ 1935 [id number]                 Emergency Contacts      (Rel.) Home Phone Work Phone Mobile Phone    Prabhu Saucedo (Power of ) -- -- [phone]    Destiney Saucedo (Power of ) -- -- [phone]            Insurance Information                HUMANA MEDICARE REPLACEMENT/HUMANA MEDICARE REPL Phone: [phone]    Subscriber: Omayra Bhardwaj Subscriber#: [id number]    Group#: [id number] Precert#: [id number]             History & Physical      Carlo Arias MD at 4/6/2019  2:45 PM              [facility] " Services  HISTORY AND PHYSICAL    Patient Name: Omayra Bhardwaj  : 1935  MRN: [id number]  Primary Care Physician: Suzanna White MD  Date of admission: 2019      Subjective   Subjective     Chief Complaint:  Cough, SOB    HPI:  Omayra Bhardwaj is a 83 y.o. female with past medical history significant for hypertension, hyperlipidemia, multiple ALS for which patient follows with Dr. Lopez.  Patient is currently a resident of assisted living and can ambulate very little bit the help of a walker.  Patient's speech is severely impaired.  Patient also has dysphagia and aerated to the last evaluation was done about 8 -9 months ago.  However, patient still continues to have regular overall intake.  Family tells me that patient has had previous episodes of cough secondary to aspiration.  However since couple of days ago patient has had very vigorous cough and shortness of air out of proportion compared to previous episodes.  Prior to that evident the patient was in her usual state of health.  No fever or chills.  No chest pain, palpitation.  No nausea, vomiting, diarrhea, abdominal pain.  No headache or watery eyes or coryza.    Review of Systems       Otherwise complete ROS reviewed and is negative except as mentioned in the HPI.    Personal History     Past Medical History:   Diagnosis Date   • Chest pain 2015    LEXISCAN STRESS TEST    • Chronic hypertension     probably essential.     • Chronic lymphedema     with probable chronic lower extremity venous insufficiency with recent acceptable bilateral lower extremity venous duplex study, 2013.   • Depression    • Disordered sleep     with prominent snoring and occasional nocturnal hypersomnolence.    • Dyslipidemia      untreated.   • Former tobacco use    • GERD (gastroesophageal reflux disease)    • History of obesity     (BMI 29.5).   • History of obesity     (BMI 29.5).   • Hypercalcemia 2010   • Hyperlipidemia    •  Hyperparathyroidism (CMS/HCC)     with contemplated parathyroidectomy (Dr. Davis, [facility]).   • Hyperparathyroidism (CMS/HCC) 2013    HYPERPARATHYROID- BENIGN BIOPSY 3 THYROID NODULES- WATCHFUL WAITING- DR. DIANE JON AT     • Hypertension    • Hypertensive cardiovascular disease     probable   • Obesity, Class I, BMI 30-34.9 1/17/2019   • Rib pain on left side 07/28/2017    RIB PAIN ON LEFT SIDE    • Skin cancer 2010    SQAMOUS CELL SKIN CANCER- RESECTED    • Staph infection     L FINGER AFTER STICTCHES REMOVED    • Subarachnoid hemorrhage, traumatic (CMS/HCC) 07/15/2017    SUPPORTIVE CARE IN ICU, THEN HOME HEALTH WITH PT AND OT   • Syncope 1/29/2018   • Thyroid nodule     workup pending.   • UTI (urinary tract infection) 7/15/2017       Past Surgical History:   Procedure Laterality Date   • APPENDECTOMY  1945   • BREAST BIOPSY Left    • BREAST EXCISIONAL BIOPSY Left    • CATARACT EXTRACTION  2010    bilateral    • OTHER SURGICAL HISTORY  1986    Laparoscopy   • PARATHYROIDECTOMY     • SQUAMOUS CELL CARCINOMA EXCISION  2010   • TONSILLECTOMY  1942       Family History: family history includes Alcohol abuse in her brother, father, mother, and son; Cancer in her brother, mother, and paternal grandmother; Colon polyps in her brother; Coronary artery disease in her brother, maternal grandfather, and maternal uncle; Heart disease in her brother; Heart failure in her brother; Hypertension in her maternal grandfather and other; Obesity in her other; Skin cancer in her brother. Otherwise pertinent FHx was reviewed and unremarkable.     Social History:  reports that she quit smoking about 56 years ago. Her smoking use included cigarettes. She has never used smokeless tobacco. She reports that she does not drink alcohol or use drugs.  Social History     Social History Narrative   • Not on file       Medications:    Available home medication information reviewed.    (Not in a hospital  admission)    No Known Allergies    Objective   Objective     Vital Signs:   Temp:  [99.5 °F (37.5 °C)] 99.5 °F (37.5 °C)  Heart Rate:  [] 75  Resp:  [20] 20  BP: (129-165)/(67-88) 140/70        Physical Exam   Constitutional: Awake, alert  Eyes: PERRLA, sclerae anicteric, no conjunctival injection  HENT: NCAT, mucous membranes moist  Neck: Muscle weakness of the neck and patient has difficulty holding her head up.  Respiratory: Very limited exam.  Harsh breath sounds bilaterally, cough on deep inspiration.  Nonlabored respirations   Cardiovascular: RRR, no murmurs, rubs, or gallops.  Gastrointestinal: Positive bowel sounds, soft, nontender, nondistended  Musculoskeletal: No bilateral ankle edema, no clubbing or cyanosis to extremities  Psychiatric: Appropriate affect, cooperative  Neurologic: His speech is severely impaired patient communicates with writing.  Patient is awake and alert and follows commands.  Severe weakness of neck and shoulder muscles.  Also weakness of lower extremities bilaterally.  Skin: No rashes  Results Reviewed:  I have personally reviewed current lab, radiology, and data and agree.    Results from last 7 days   Lab Units 04/06/19  1146   WBC 10*3/mm3 5.87   HEMOGLOBIN g/dL 14.1   HEMATOCRIT % 43.0   PLATELETS 10*3/mm3 149*     Results from last 7 days   Lab Units 04/06/19  1154 04/06/19  1146   SODIUM mmol/L  --  144   POTASSIUM mmol/L  --  3.6   CHLORIDE mmol/L  --  106   CO2 mmol/L  --  25.0   BUN mg/dL  --  18   CREATININE mg/dL  --  0.74   GLUCOSE mg/dL  --  79   CALCIUM mg/dL  --  10.8*   ALT (SGPT) U/L  --  15   AST (SGOT) U/L  --  25   TROPONIN I ng/mL 0.00  --      Estimated Creatinine Clearance: 52.1 mL/min (by C-G formula based on SCr of 0.74 mg/dL).  Brief Urine Lab Results  (Last result in the past 365 days)      Color   Clarity   Blood   Leuk Est   Nitrite   Protein   CREAT   Urine HCG        03/28/19 0117 Dark Yellow Clear Trace Trace Negative Negative             BNP    Date Value Ref Range Status   04/06/2019 50.0 0.0 - 100.0 pg/mL Final     Comment:     Results may be falsely decreased if patient taking Biotin.     Imaging Results (last 24 hours)     Procedure Component Value Units Date/Time    XR Chest 1 View [[id number]] Collected:  04/06/19 1417     Updated:  04/06/19 1417    Narrative:          EXAMINATION: XR CHEST 1 VW - 04/06/2019     INDICATION: Shortness of air.     COMPARISON: 03/27/2019     FINDINGS: Heart is normal in size. Vasculature appears normal.  Coarsening of the basilar interstitial markings is unchanged from the  prior study, apparently chronic. Left shoulder joint DJD and hiatal  hernia are again noted.           Impression:       Stable chest exam with mild chronic-appearing interstitial  changes and hiatal hernia. No acute chest disease is seen.     DICTATED:   04/06/2019  EDITED/ls :   04/06/2019           CT Chest Without Contrast [[id number]] Collected:  04/06/19 1342     Updated:  04/06/19 1342    Narrative:       EXAMINATION: CT CHEST WO CONTRAST - 04/06/2019     INDICATION: Cough. Evaluate for pneumonia.     TECHNIQUE: Spiral acquisition 5 mm unenhanced images through the chest.     The radiation dose reduction device was turned on for each scan per the  ALARA (As Low as Reasonably Achievable) protocol.     COMPARISON: 07/15/2017 chest CT scan. Portable chest radiograph of  today's date.     FINDINGS: Lungs appear practically clear. In the left lower lobe, there  is some peribronchial thickening, perhaps a few opacified bronchi, and  minimal airspace disease which may represent a focal bronchitis or the  very earliest changes of pneumonia. No similar findings are seen  elsewhere. There is no pleural effusion. Mediastinal window images show  patient's large hiatal hernia. There is no evidence of significant  adenopathy and no pericardial effusion. Included images of the upper  abdomen show a bland-appearing left upper pole renal cyst.  Included  portions of the spleen, gallbladder, liver, pancreatic tail, adrenal  glands and right upper renal pole appear unremarkable.       Impression:       Very mild posterior left lower lobe disease, new from prior  study, possibly early changes of pneumonia or bronchitis, or in a  patient of this age, mild changes of aspiration. No other evidence of  active chest disease elsewhere.     DICTATED:   04/06/2019  EDITED/ls :   04/06/2019            Results for orders placed during the hospital encounter of 07/15/17   Adult Transthoracic Echo Complete    Narrative · Left ventricular systolic function is normal. Estimated EF = 70%.  · Left ventricular diastolic dysfunction (grade I) consistent with   impaired relaxation.  · Right ventricular cavity is mildly dilated.  · Normal right ventricular wall thickness, systolic function and septal   motion noted with right ventricular cavity mildly dilated.  · No evidence of pulmonary hypertension is present.  · There is no evidence of pericardial effusion.  · No significant structural valvular abnormality demonstrated.          Assessment/Plan   Assessment / Plan     Active Hospital Problems    Diagnosis POA   • Aspiration pneumonia (CMS/[facility]) [J69.0] Yes     *Shortness of breath and cough for couple of days.  No fever or chills.  CT scan of the long without contrast shows evidence of lower lobe infiltrate possibly aspiration.  So the symptoms are possibly secondary to early aspiration pneumonia.    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    *Dysphagia    * HTN    * HLP.  PLAN:  -Admit the patient to telemetry.  -Continue Zosyn  -Home medication  -Swallow study tomorrow  -If it becomes necessary will ask neurology to see the patient  -Heparin for DVT.    DVT prophylaxis:  Heparin.  CODE STATUS:    Code Status and Medical Interventions:   Ordered at: 04/06/19 1441     Code Status:    CPR     Medical Interventions (Level of Support Prior to Arrest):    Full        Admission Status: inpatinet    Electronically signed by Carlo Arias MD, 04/06/19, 2:46 PM.        Electronically signed by Carlo Arias MD at 4/6/2019  2:54 PM          Emergency Department Notes      Yazan Pugh APRN at 4/6/2019 11:25 AM     Attestation signed by Parveen Gill MD at 4/8/2019 10:42 AM          For this patient encounter, I reviewed the NP or PA documentation, treatment plan, and medical decision making. Parveen Gill MD 4/8/2019 10:42 AM                  Subjective   Omayra Bhardwaj is an 83 y.o.female who was brought to the emergency department by her daughter because she has been coughing and short of breath since yesterday. She also has some chest congestion but has been unable to cough up any sputum. 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 No other acute complaints reported at this time.        History provided by:  Patient and relative  Shortness of Breath   Severity:  Moderate  Onset quality:  Sudden  Duration:  2 days  Timing:  Constant  Progression:  Unchanged  Chronicity:  New  Relieved by:  None tried  Worsened by:  Nothing  Ineffective treatments:  None tried  Associated symptoms: cough    Associated symptoms: no sputum production        Review of Systems   HENT: Positive for congestion and trouble swallowing.    Respiratory: Positive for cough and shortness of breath. Negative for sputum production.    All other systems reviewed and are negative.      Past Medical History:   Diagnosis Date   • Chest pain 05/2015    LEXISCAN STRESS TEST    • Chronic hypertension     probably essential.     • Chronic lymphedema     with probable chronic lower extremity venous insufficiency with recent acceptable bilateral lower extremity venous duplex study, March 2013.   • Depression    • Disordered sleep     with prominent snoring and occasional nocturnal hypersomnolence.    • Dyslipidemia      untreated.   •  Former tobacco use    • GERD (gastroesophageal reflux disease)    • History of obesity     (BMI 29.5).   • History of obesity     (BMI 29.5).   • Hypercalcemia 2010   • Hyperlipidemia    • Hyperparathyroidism (CMS/HCC)     with contemplated parathyroidectomy (Dr. Davis, [facility]).   • Hyperparathyroidism (CMS/HCC) 2013    HYPERPARATHYROID- BENIGN BIOPSY 3 THYROID NODULES- WATCHFUL WAITING- DR. DIANE JON AT     • Hypertension    • Hypertensive cardiovascular disease     probable   • Obesity, Class I, BMI 30-34.9 1/17/2019   • Rib pain on left side 07/28/2017    RIB PAIN ON LEFT SIDE    • Skin cancer 2010    SQAMOUS CELL SKIN CANCER- RESECTED    • Staph infection     L FINGER AFTER STICTCHES REMOVED    • Subarachnoid hemorrhage, traumatic (CMS/HCC) 07/15/2017    SUPPORTIVE CARE IN ICU, THEN HOME HEALTH WITH PT AND OT   • Syncope 1/29/2018   • Thyroid nodule     workup pending.   • UTI (urinary tract infection) 7/15/2017       No Known Allergies    Past Surgical History:   Procedure Laterality Date   • APPENDECTOMY  1945   • BREAST BIOPSY Left    • BREAST EXCISIONAL BIOPSY Left    • CATARACT EXTRACTION  2010    bilateral    • OTHER SURGICAL HISTORY  1986    Laparoscopy   • PARATHYROIDECTOMY     • SQUAMOUS CELL CARCINOMA EXCISION  2010   • TONSILLECTOMY  1942       Family History   Problem Relation Age of Onset   • Cancer Mother         THROAT CANCER    • Alcohol abuse Mother    • Alcohol abuse Father    • Heart disease Brother    • Heart failure Brother    • Cancer Brother         THROAT CANCER    • Alcohol abuse Brother    • Colon polyps Brother    • Coronary artery disease Brother         PREMATURE   • Skin cancer Brother         MELANOMA   • Hypertension Other    • Obesity Other    • Alcohol abuse Son    • Coronary artery disease Maternal Uncle    • Hypertension Maternal Grandfather    • Coronary artery disease Maternal Grandfather    • Cancer Paternal Grandmother          CARCINOMATOSIS ABDOMEN    • Breast cancer Neg Hx    • Ovarian cancer Neg Hx        Social History     Socioeconomic History   • Marital status:      Spouse name: Not on file   • Number of children: Not on file   • Years of education: Not on file   • Highest education level: Not on file   Tobacco Use   • Smoking status: Former Smoker     Types: Cigarettes     Last attempt to quit: 1963     Years since quittin.2   • Smokeless tobacco: Never Used   Substance and Sexual Activity   • Alcohol use: No   • Drug use: No   • Sexual activity: Defer         Objective   Physical Exam   Constitutional: She is oriented to person, place, and time. She appears well-developed and well-nourished. No distress.   HENT:   Head: Normocephalic and atraumatic.   Mouth/Throat: Oropharynx is clear and moist.   Eyes: Conjunctivae are normal. No scleral icterus.   Neck: Normal range of motion. Neck supple.   Cardiovascular: Normal rate, regular rhythm and normal heart sounds.   No murmur heard.  Pulmonary/Chest: Effort normal. No respiratory distress. She has wheezes. She has rhonchi.   Thick deep cough, not very productive. Diffuse rhonchi. Thick wheezing.   Abdominal: Soft. There is no tenderness.   Musculoskeletal: She exhibits no edema.   Neurological: She is alert and oriented to person, place, and time.   Skin: Skin is warm and dry. No rash noted. No erythema.   Psychiatric: She has a normal mood and affect. Her behavior is normal.   Nursing note and vitals reviewed.      Procedures        ED Course  ED Course as of 1419   Sat 2019   1330 Hospitalist paged  [JM]   9310 I spoke with Dr. Arias in person and he will admit  [JADEN]      ED Course User Index  [JM] Yazan Pugh, KT     Recent Results (from the past 24 hour(s))   Comprehensive Metabolic Panel    Collection Time: 19 11:46 AM   Result Value Ref Range    Glucose 79 70 - 100 mg/dL    BUN 18 9 - 23 mg/dL    Creatinine 0.74 0.60 - 1.30 mg/dL     Sodium 144 132 - 146 mmol/L    Potassium 3.6 3.5 - 5.5 mmol/L    Chloride 106 99 - 109 mmol/L    CO2 25.0 20.0 - 31.0 mmol/L    Calcium 10.8 (H) 8.7 - 10.4 mg/dL    Total Protein 6.7 5.7 - 8.2 g/dL    Albumin 4.37 3.20 - 4.80 g/dL    ALT (SGPT) 15 7 - 40 U/L    AST (SGOT) 25 0 - 33 U/L    Alkaline Phosphatase 83 25 - 100 U/L    Total Bilirubin 0.8 0.3 - 1.2 mg/dL    eGFR Non African Amer 75 >60 mL/min/1.73    Globulin 2.3 gm/dL    A/G Ratio 1.9 1.5 - 2.5 g/dL    BUN/Creatinine Ratio 24.3 7.0 - 25.0    Anion Gap 13.0 (H) 3.0 - 11.0 mmol/L   BNP    Collection Time: 04/06/19 11:46 AM   Result Value Ref Range    BNP 50.0 0.0 - 100.0 pg/mL   Light Blue Top    Collection Time: 04/06/19 11:46 AM   Result Value Ref Range    Extra Tube hold for add-on    Green Top (Gel)    Collection Time: 04/06/19 11:46 AM   Result Value Ref Range    Extra Tube Hold for add-ons.    Lavender Top    Collection Time: 04/06/19 11:46 AM   Result Value Ref Range    Extra Tube hold for add-on    Gold Top - SST    Collection Time: 04/06/19 11:46 AM   Result Value Ref Range    Extra Tube Hold for add-ons.    CBC Auto Differential    Collection Time: 04/06/19 11:46 AM   Result Value Ref Range    WBC 5.87 3.50 - 10.80 10*3/mm3    RBC 4.62 3.89 - 5.14 10*6/mm3    Hemoglobin 14.1 11.5 - 15.5 g/dL    Hematocrit 43.0 34.5 - 44.0 %    MCV 93.1 80.0 - 99.0 fL    MCH 30.5 27.0 - 31.0 pg    MCHC 32.8 32.0 - 36.0 g/dL    RDW 13.8 11.3 - 14.5 %    RDW-SD 47.4 37.0 - 54.0 fl    MPV 10.3 6.0 - 12.0 fL    Platelets 149 (L) 150 - 450 10*3/mm3    Neutrophil % 76.0 (H) 41.0 - 71.0 %    Lymphocyte % 12.6 (L) 24.0 - 44.0 %    Monocyte % 10.6 0.0 - 12.0 %    Eosinophil % 0.3 0.0 - 3.0 %    Basophil % 0.3 0.0 - 1.0 %    Immature Grans % 0.2 0.0 - 0.6 %    Neutrophils, Absolute 4.46 1.50 - 8.30 10*3/mm3    Lymphocytes, Absolute 0.74 0.60 - 4.80 10*3/mm3    Monocytes, Absolute 0.62 0.00 - 1.00 10*3/mm3    Eosinophils, Absolute 0.02 0.00 - 0.30 10*3/mm3    Basophils,  Absolute 0.02 0.00 - 0.20 10*3/mm3    Immature Grans, Absolute 0.01 0.00 - 0.05 10*3/mm3   Procalcitonin    Collection Time: 04/06/19 11:46 AM   Result Value Ref Range    Procalcitonin 0.38 (H) <=0.25 ng/mL   Lactic Acid, Plasma    Collection Time: 04/06/19 11:46 AM   Result Value Ref Range    Lactate 1.0 0.5 - 2.0 mmol/L   POC Troponin, Rapid    Collection Time: 04/06/19 11:54 AM   Result Value Ref Range    Troponin I 0.00 0.00 - 0.07 ng/mL     Note: In addition to lab results from this visit, the labs listed above may include labs taken at another facility or during a different encounter within the last 24 hours. Please correlate lab times with ED admission and discharge times for further clarification of the services performed during this visit.    XR Chest 1 View   Preliminary Result   Stable chest exam with mild chronic-appearing interstitial   changes and hiatal hernia. No acute chest disease is seen.       DICTATED:   04/06/2019   EDITED/ls :   04/06/2019               CT Chest Without Contrast   Preliminary Result   Very mild posterior left lower lobe disease, new from prior   study, possibly early changes of pneumonia or bronchitis, or in a   patient of this age, mild changes of aspiration. No other evidence of   active chest disease elsewhere.       DICTATED:   04/06/2019   EDITED/ls :   04/06/2019             Vitals:    04/06/19 1230 04/06/19 1231 04/06/19 1330 04/06/19 1345   BP: 129/77  140/70    BP Location:       Patient Position:       Pulse:  81  75   Resp:       Temp:       TempSrc:       SpO2:  94% 93% 93%   Weight:       Height:         Medications   sodium chloride 0.9 % flush 10 mL (not administered)   sodium chloride 0.9 % flush 10 mL (not administered)   ipratropium-albuterol (DUO-NEB) nebulizer solution 3 mL (3 mL Nebulization Given 4/6/19 1158)   piperacillin-tazobactam (ZOSYN) 3.375 g in iso-osmotic dextrose 50 ml (premix) (3.375 g Intravenous New Bag 4/6/19 1347)     ECG/EMG Results (last  24 hours)     Procedure Component Value Units Date/Time    ECG 12 Lead [[id number]] Collected:  04/06/19 1121     Updated:  04/06/19 1126    Narrative:       Test Reason : SOA Protocol  Blood Pressure : **/** mmHG  Vent. Rate : 082 BPM     Atrial Rate : 082 BPM     P-R Int : 150 ms          QRS Dur : 090 ms      QT Int : 380 ms       P-R-T Axes : 046 -33 018 degrees     QTc Int : 443 ms    Normal sinus rhythm  Left axis deviation  Cannot rule out Anterior infarct (cited on or before 27-MAR-2019)  Abnormal ECG  When compared with ECG of 27-MAR-2019 22:19,  No significant change was found  Confirmed by HE BAEZ MD ([id number]) on 4/6/2019 11:26:40 AM    Referred By:  EDMD           Confirmed By:HE BAEZ MD        ECG 12 Lead   Final Result   Test Reason : SOA Protocol   Blood Pressure : **/** mmHG   Vent. Rate : 082 BPM     Atrial Rate : 082 BPM      P-R Int : 150 ms          QRS Dur : 090 ms       QT Int : 380 ms       P-R-T Axes : 046 -33 018 degrees      QTc Int : 443 ms      Normal sinus rhythm   Left axis deviation   Cannot rule out Anterior infarct (cited on or before 27-MAR-2019)   Abnormal ECG   When compared with ECG of 27-MAR-2019 22:19,   No significant change was found   Confirmed by HE BAEZ MD ([id number]) on 4/6/2019 11:26:40 AM      Referred By:  EDMD           Confirmed By:HE BAEZ MD                         [facility]    Final diagnoses:   Aspiration pneumonia, unspecified aspiration pneumonia type, unspecified laterality, unspecified part of lung (CMS/HCC)   ALS (amyotrophic lateral sclerosis) (CMS/HCC)   Dysphagia, unspecified type       Documentation assistance provided by lola Perez.  Information recorded by the scribe was done at my direction and has been verified and validated by me.     Joseline Perez  04/06/19 1206       Yazan Pugh APRN  04/06/19 1420      Electronically signed by He Baez MD at 4/8/2019 10:42 AM       Hospital Medications (active)       Dose  "Frequency Start End    acebutolol (SECTRAL) capsule 200 mg 200 mg Every 24 Hours Scheduled 4/6/2019     Sig - Route: Take 1 capsule by mouth Daily. - Oral    amoxicillin-clavulanate (AUGMENTIN) 400-57 MG/5ML suspension 872 mg 875 mg of amoxicillin Every 12 Hours Scheduled 4/10/2019 4/16/2019    Sig - Route: Take 10.9 mL by mouth Every 12 (Twelve) Hours. - Oral    atorvastatin (LIPITOR) tablet 10 mg 10 mg Nightly 4/6/2019     Sig - Route: Take 1 tablet by mouth Every Night. - Oral    glycopyrrolate (ROBINUL) tablet 1 mg 1 mg 2 Times Daily 4/10/2019     Sig - Route: Take 1 tablet by mouth 2 (Two) Times a Day. - Oral    guaiFENesin (ROBITUSSIN) 100 MG/5ML oral solution 400 mg 400 mg Every 8 Hours 4/10/2019     Sig - Route: Take 20 mL by mouth Every 8 (Eight) Hours. - Oral    heparin (porcine) 5000 UNIT/ML injection 5,000 Units 5,000 Units Every 12 Hours Scheduled 4/6/2019     Sig - Route: Inject 1 mL under the skin into the appropriate area as directed Every 12 (Twelve) Hours. - Subcutaneous    lactulose (CHRONULAC) 10 GM/15ML solution 20 g 20 g Daily 4/10/2019     Sig - Route: Take 30 mL by mouth Daily. - Oral    lisinopril (PRINIVIL,ZESTRIL) tablet 5 mg 5 mg Daily 4/6/2019     Sig - Route: Take 1 tablet by mouth Daily. - Oral    Morphine sulfate (PF) injection 1 mg 1 mg Every 4 Hours PRN 4/6/2019 4/16/2019    Sig - Route: Infuse 0.25 mL into a venous catheter Every 4 (Four) Hours As Needed for Moderate Pain . - Intravenous    Linked Group 1:  \"And\" Linked Group Details        naloxone (NARCAN) injection 0.4 mg 0.4 mg Every 5 Minutes PRN 4/6/2019     Sig - Route: Infuse 1 mL into a venous catheter Every 5 (Five) Minutes As Needed for Respiratory Depression. - Intravenous    Linked Group 1:  \"And\" Linked Group Details        sodium chloride 0.9 % flush 10 mL 10 mL As Needed 4/6/2019     Sig - Route: Infuse 10 mL into a venous catheter As Needed for Line Care. - Intravenous    Cosign for Ordering: Accepted by Jairo, " "Parveen TYLER MD on 2019 11:59 AM    sodium chloride 0.9 % flush 10 mL 10 mL As Needed 2019     Sig - Route: Infuse 10 mL into a venous catheter As Needed for Line Care. - Intravenous    Linked Group 2:  \"And\" Linked Group Details        sodium chloride 0.9 % flush 3 mL 3 mL Every 12 Hours Scheduled 2019     Sig - Route: Infuse 3 mL into a venous catheter Every 12 (Twelve) Hours. - Intravenous    sodium chloride 0.9 % flush 3-10 mL 3-10 mL As Needed 2019     Sig - Route: Infuse 3-10 mL into a venous catheter As Needed for Line Care. - Intravenous    torsemide (DEMADEX) tablet 5 mg 5 mg Daily 2019     Sig - Route: Take 0.5 tablets by mouth Daily. - Oral    amoxicillin-clavulanate (AUGMENTIN) 875-125 MG per tablet 1 tablet (Discontinued) 1 tablet Every 12 Hours Scheduled 4/10/2019 4/10/2019    Sig - Route: Take 1 tablet by mouth Every 12 (Twelve) Hours. - Oral    guaiFENesin (MUCINEX) 12 hr tablet 600 mg (Discontinued) 600 mg Every 12 Hours Scheduled 4/10/2019 4/10/2019    Sig - Route: Take 1 tablet by mouth Every 12 (Twelve) Hours. - Oral    guaiFENesin tablet 400 mg (Discontinued) 400 mg Every 8 Hours Scheduled 4/10/2019 4/10/2019    Sig - Route: Take 2 tablets by mouth Every 8 (Eight) Hours. - Oral    piperacillin-tazobactam (ZOSYN) 4.5 g in iso-osmotic dextrose 100 mL IVPB (premix) (Discontinued) 4.5 g Every 8 Hours 2019 4/10/2019    Sig - Route: Infuse 100 mL into a venous catheter Every 8 (Eight) Hours. - Intravenous          Operative/Procedure Notes (last 72 hours) (Notes from 2019 10:21 AM through 2019 10:21 AM)     No notes of this type exist for this encounter.           Physician Progress Notes (last 24 hours) (Notes from 4/10/2019 10:21 AM through 2019 10:21 AM)      Dawood Leon MD at 4/10/2019 10:47 AM              [facility] Medicine Services  PROGRESS NOTE    Patient Name: Omayra Bhardwaj  : 1935  MRN: [id number]    Date of " Admission: 4/6/2019  Length of Stay: 4  Primary Care Physician: Suzanna White MD    Subjective   Subjective     CC:  Aspiration PNA / ALS    HPI:      Still coughing. Denies shortness of breath. Denies pain. Frustrated by congestion more than anything.    Review of Systems    Unable to assess    Otherwise ROS is negative except as mentioned in the HPI.    Objective   Objective     Vital Signs:   Temp:  [97.7 °F (36.5 °C)-97.9 °F (36.6 °C)] 97.7 °F (36.5 °C)  Heart Rate:  [60-65] 60  Resp:  [16-18] 16  BP: (138-149)/(58-81) 138/72        Physical Exam:    NAD, unintelligible speech, no family at bedside, writing in notebook  OP clear, MMM  Neck supple  No LAD  RRR  Diminished but no coarse breath sounds  +BS, ND, NT  BANDA, but weak  No rashes  Normal affect  No change 4/9    Results Reviewed:  I have personally reviewed current lab, radiology, and data and agree.    Results from last 7 days   Lab Units 04/10/19  0513 04/09/19  0553 04/07/19  1013   WBC 10*3/mm3 3.98 6.65 7.81   HEMOGLOBIN g/dL 13.0 13.0 13.6   HEMATOCRIT % 41.1 40.4 42.5   PLATELETS 10*3/mm3 162 165 126*     Results from last 7 days   Lab Units 04/10/19  0513 04/09/19  0553 04/06/19  1154 04/06/19  1146   SODIUM mmol/L 141 140  --  144   POTASSIUM mmol/L 3.6 3.6  --  3.6   CHLORIDE mmol/L 103 100  --  106   CO2 mmol/L 23.0 20.0*  --  25.0   BUN mg/dL 15 17  --  18   CREATININE mg/dL 0.58 0.71  --  0.74   GLUCOSE mg/dL 75 74  --  79   CALCIUM mg/dL 9.8 10.0  --  10.8*   ALT (SGPT) U/L  --   --   --  15   AST (SGOT) U/L  --   --   --  25   TROPONIN I ng/mL  --   --  0.00  --      Estimated Creatinine Clearance: 54.5 mL/min (by C-G formula based on SCr of 0.58 mg/dL).    No results found for: BNP    Microbiology Results Abnormal     Procedure Component Value - Date/Time    Blood Culture - Blood, Arm, Right [[id number]] Collected:  04/06/19 1135    Lab Status:  Preliminary result Specimen:  Blood from Arm, Right Updated:  04/09/19 1215     Blood  Culture No growth at 3 days    Blood Culture - Blood, Arm, Left [[id number]] Collected:  04/06/19 1146    Lab Status:  Preliminary result Specimen:  Blood from Arm, Left Updated:  04/09/19 1215     Blood Culture No growth at 3 days          Imaging Results (last 24 hours)     Procedure Component Value Units Date/Time    XR Chest 1 View [[id number]] Collected:  04/09/19 0923     Updated:  04/09/19 1126    Narrative:       EXAMINATION: XR CHEST 1 VW-      INDICATION: Dyspnea, on exertion.      COMPARISON: 04/06/2019.     FINDINGS: Cardiac size borderline enlarged and unchanged. Chronic  changes of the lungs including hyperinflated appearance without focal  consolidation or opacification. No pneumothorax or significant effusion.            Impression:       No significant interval change. No new parenchymal disease.     D:  04/09/2019  E:  04/09/2019     This report was finalized on 4/9/2019 11:23 AM by Dr. Terence Vega.             Results for orders placed during the hospital encounter of 07/15/17   Adult Transthoracic Echo Complete    Narrative · Left ventricular systolic function is normal. Estimated EF = 70%.  · Left ventricular diastolic dysfunction (grade I) consistent with   impaired relaxation.  · Right ventricular cavity is mildly dilated.  · Normal right ventricular wall thickness, systolic function and septal   motion noted with right ventricular cavity mildly dilated.  · No evidence of pulmonary hypertension is present.  · There is no evidence of pericardial effusion.  · No significant structural valvular abnormality demonstrated.          I have reviewed the medications:    Current Facility-Administered Medications:   •  acebutolol (SECTRAL) capsule 200 mg, 200 mg, Oral, Q24H, Carlo Arias MD, 200 mg at 04/10/19 0926  •  amoxicillin-clavulanate (AUGMENTIN) 875-125 MG per tablet 1 tablet, 1 tablet, Oral, Q12H, Dawood Leon MD  •  atorvastatin (LIPITOR) tablet 10 mg, 10 mg, Oral, Nightly, Hugo  MD Carlo, 10 mg at 04/09/19 2023  •  glycopyrrolate (ROBINUL) tablet 1 mg, 1 mg, Oral, BID, Dawood Leon MD  •  guaiFENesin (MUCINEX) 12 hr tablet 600 mg, 600 mg, Oral, Q12H, Dawood Leon MD  •  heparin (porcine) 5000 UNIT/ML injection 5,000 Units, 5,000 Units, Subcutaneous, Q12H, Carlo Arias MD, 5,000 Units at 04/10/19 0926  •  lisinopril (PRINIVIL,ZESTRIL) tablet 5 mg, 5 mg, Oral, Daily, Carlo Arias MD, 5 mg at 04/10/19 0926  •  Morphine sulfate (PF) injection 1 mg, 1 mg, Intravenous, Q4H PRN **AND** naloxone (NARCAN) injection 0.4 mg, 0.4 mg, Intravenous, Q5 Min PRN, Carlo Arias MD  •  sodium chloride 0.9 % flush 10 mL, 10 mL, Intravenous, PRN, Parveen Gill MD  •  [COMPLETED] Insert peripheral IV, , , Once **AND** sodium chloride 0.9 % flush 10 mL, 10 mL, Intravenous, PRN, Yazan Pugh APRN  •  sodium chloride 0.9 % flush 3 mL, 3 mL, Intravenous, Q12H, Carlo Arias MD, 3 mL at 04/10/19 0926  •  sodium chloride 0.9 % flush 3-10 mL, 3-10 mL, Intravenous, PRN, Carlo Arias MD  •  torsemide (DEMADEX) tablet 5 mg, 5 mg, Oral, Daily, Carlo Arias MD, 5 mg at 04/10/19 0926      Assessment/Plan   Assessment / Plan     Active Hospital Problems    Diagnosis POA   • Aspiration pneumonia (CMS/HCC) [J69.0] Yes          Brief Hospital Course to date:  Omayra Bhardwaj is a 83 y.o. 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     *Dysphagia     * HTN     * HLP        DVT Prophylaxis:  Heparin SC    Disposition: I expect the patient to be discharged TBD    CODE STATUS:   Code Status and Medical Interventions:   Ordered at: 04/07/19 5601     Limited Support to NOT Include:    Intubation    Cardioversion/Defibrillation    Antiarrhythmic Drugs    Vasopressors     Code Status:     No CPR     Medical Interventions (Level of Support Prior to Arrest):    Limited         Electronically signed by Dawood Leon MD, 04/10/19, 10:47 AM.      Electronically signed by Dawood Leon MD at 4/10/2019 10:48 AM       Discharge Summary     No notes of this type exist for this encounter.

## 2019-04-11 NOTE — PROGRESS NOTES
Continued Stay Note  [facility]     Patient Name: Omayra Bhardwaj  MRN: [id number]  Today's Date: 4/11/2019    Admit Date: 4/6/2019    Discharge Plan     Row Name 04/11/19 1537       Plan    Plan  [facility] with [facility], [facility] team.     Patient/Family in Agreement with Plan  yes    Final Note  PPS 50%.  Chart reviewed, visit made.  Patient wishes to discharge back to [facility] today via private care.  DME delivered this am.  Conversation with Prabhu (daughter,) who expresses appreciation for hospice services.  [facility] number provided to patient and daughter prior to discharge.  If may be of further assistance, please call [phone].         Discharge Codes    No documentation.       Expected Discharge Date and Time     Expected Discharge Date Expected Discharge Time    Apr 11, 2019             Carmen Haskins RN

## 2019-04-12 ENCOUNTER — TRANSITIONAL CARE MANAGEMENT TELEPHONE ENCOUNTER (OUTPATIENT)
Dept: INTERNAL MEDICINE | Facility: CLINIC | Age: 84
End: 2019-04-12

## 2019-04-12 ENCOUNTER — READMISSION MANAGEMENT (OUTPATIENT)
Dept: CALL CENTER | Facility: HOSPITAL | Age: 84
End: 2019-04-12

## 2019-04-12 NOTE — OUTREACH NOTE
Prep Survey      Responses   Facility patient discharged from?  [place]   Is patient eligible?  No   What are the reasons patient is not eligible?  Hospice/Pallative Care   Does the patient have one of the following disease processes/diagnoses(primary or secondary)?  COPD/Pneumonia   Prep survey completed?  Yes          Deborah Taylor, RN

## 2019-04-24 ENCOUNTER — APPOINTMENT (OUTPATIENT)
Dept: MAMMOGRAPHY | Facility: HOSPITAL | Age: 84
End: 2019-04-24
Attending: SURGERY

## 2020-02-10 ENCOUNTER — TELEPHONE (OUTPATIENT)
Dept: INTERNAL MEDICINE | Facility: CLINIC | Age: 85
End: 2020-02-10